# Patient Record
Sex: FEMALE | Race: BLACK OR AFRICAN AMERICAN | NOT HISPANIC OR LATINO | Employment: OTHER | RURAL
[De-identification: names, ages, dates, MRNs, and addresses within clinical notes are randomized per-mention and may not be internally consistent; named-entity substitution may affect disease eponyms.]

---

## 2017-09-18 ENCOUNTER — HISTORICAL (OUTPATIENT)
Dept: ADMINISTRATIVE | Facility: HOSPITAL | Age: 67
End: 2017-09-18

## 2017-09-21 LAB
LAB AP COMMENTS: NORMAL
LAB AP DIAGNOSIS - HISTORICAL: NORMAL
LAB AP GENERAL CAT - HISTORICAL: NORMAL
LAB AP GROSS PATHOLOGY - HISTORICAL: NORMAL
LAB AP INTERPRETATION/RESULT - HISTORICAL: NEGATIVE
LAB AP SPECIMEN ADEQUACY - HISTORICAL: NORMAL
LAB AP SPECIMEN SUBMITTED - HISTORICAL: NORMAL
LAB AP SPECIMEN SUBMITTED - HISTORICAL: NORMAL

## 2019-08-22 ENCOUNTER — HISTORICAL (OUTPATIENT)
Dept: ADMINISTRATIVE | Facility: HOSPITAL | Age: 69
End: 2019-08-22

## 2019-08-26 LAB
LAB AP CLINICAL INFORMATION: NORMAL
LAB AP DIAGNOSIS - HISTORICAL: NORMAL
LAB AP GROSS PATHOLOGY - HISTORICAL: NORMAL
LAB AP SPECIMEN SUBMITTED - HISTORICAL: NORMAL

## 2020-07-02 ENCOUNTER — HISTORICAL (OUTPATIENT)
Dept: ADMINISTRATIVE | Facility: HOSPITAL | Age: 70
End: 2020-07-02

## 2020-08-26 ENCOUNTER — HISTORICAL (OUTPATIENT)
Dept: ADMINISTRATIVE | Facility: HOSPITAL | Age: 70
End: 2020-08-26

## 2020-08-26 LAB — CALCIUM SERPL-MCNC: 9.6 MG/DL (ref 8.5–10.1)

## 2020-10-21 ENCOUNTER — HISTORICAL (OUTPATIENT)
Dept: ADMINISTRATIVE | Facility: HOSPITAL | Age: 70
End: 2020-10-21

## 2020-10-21 LAB
ALBUMIN SERPL BCP-MCNC: 3.4 G/DL (ref 3.5–5)
ALBUMIN/GLOB SERPL: 0.9 {RATIO}
ALP SERPL-CCNC: 72 U/L (ref 55–142)
ALT SERPL W P-5'-P-CCNC: 19 U/L (ref 13–56)
ANION GAP SERPL CALCULATED.3IONS-SCNC: 8 MMOL/L (ref 7–16)
AST SERPL W P-5'-P-CCNC: 13 U/L (ref 15–37)
BASOPHILS # BLD AUTO: 0.02 X10E3/UL (ref 0–0.2)
BASOPHILS NFR BLD AUTO: 0.5 % (ref 0–1)
BILIRUB SERPL-MCNC: 0.3 MG/DL (ref 0–1.2)
BUN SERPL-MCNC: 17 MG/DL (ref 7–18)
BUN/CREAT SERPL: 19
CALCIUM SERPL-MCNC: 8.6 MG/DL (ref 8.5–10.1)
CHLORIDE SERPL-SCNC: 109 MMOL/L (ref 98–107)
CHOLEST SERPL-MCNC: 205 MG/DL
CHOLEST/HDLC SERPL: 2.7 {RATIO}
CO2 SERPL-SCNC: 28 MMOL/L (ref 21–32)
CREAT SERPL-MCNC: 0.91 MG/DL (ref 0.5–1.02)
EOSINOPHIL # BLD AUTO: 0.12 X10E3/UL (ref 0–0.5)
EOSINOPHIL NFR BLD AUTO: 3.1 % (ref 1–4)
ERYTHROCYTE [DISTWIDTH] IN BLOOD BY AUTOMATED COUNT: 13.4 % (ref 11.5–14.5)
GLOBULIN SER-MCNC: 3.7 G/DL (ref 2–4)
GLUCOSE SERPL-MCNC: 98 MG/DL (ref 74–106)
HCT VFR BLD AUTO: 37.8 % (ref 38–47)
HDLC SERPL-MCNC: 76 MG/DL
HGB BLD-MCNC: 11.2 G/DL (ref 12–16)
IMM GRANULOCYTES # BLD AUTO: 0.01 X10E3/UL (ref 0–0.04)
IMM GRANULOCYTES NFR BLD: 0.3 % (ref 0–0.4)
LDLC SERPL CALC-MCNC: 107 MG/DL
LYMPHOCYTES # BLD AUTO: 1.79 X10E3/UL (ref 1–4.8)
LYMPHOCYTES NFR BLD AUTO: 45.8 % (ref 27–41)
MCH RBC QN AUTO: 26.9 PG (ref 27–31)
MCHC RBC AUTO-ENTMCNC: 29.6 G/DL (ref 32–36)
MCV RBC AUTO: 90.9 FL (ref 80–96)
MONOCYTES # BLD AUTO: 0.48 X10E3/UL (ref 0–0.8)
MONOCYTES NFR BLD AUTO: 12.3 % (ref 2–6)
MPC BLD CALC-MCNC: 11.2 FL (ref 9.4–12.4)
NEUTROPHILS # BLD AUTO: 1.49 X10E3/UL (ref 1.8–7.7)
NEUTROPHILS NFR BLD AUTO: 38 % (ref 53–65)
NRBC # BLD AUTO: 0 X10E3/UL (ref 0–0)
NRBC, AUTO (.00): 0 /100 (ref 0–0)
PLATELET # BLD AUTO: 341 X10E3/UL (ref 150–400)
POTASSIUM SERPL-SCNC: 3.9 MMOL/L (ref 3.5–5.1)
PROT SERPL-MCNC: 7.1 G/DL (ref 6.4–8.2)
RBC # BLD AUTO: 4.16 X10E6/UL (ref 4.2–5.4)
SODIUM SERPL-SCNC: 141 MMOL/L (ref 136–145)
TRIGL SERPL-MCNC: 110 MG/DL
WBC # BLD AUTO: 3.91 X10E3/UL (ref 4.5–11)

## 2021-04-13 ENCOUNTER — OFFICE VISIT (OUTPATIENT)
Dept: VASCULAR SURGERY | Facility: CLINIC | Age: 71
End: 2021-04-13
Payer: COMMERCIAL

## 2021-04-13 VITALS
HEIGHT: 64 IN | SYSTOLIC BLOOD PRESSURE: 126 MMHG | HEART RATE: 64 BPM | RESPIRATION RATE: 16 BRPM | BODY MASS INDEX: 32.03 KG/M2 | DIASTOLIC BLOOD PRESSURE: 68 MMHG | WEIGHT: 187.63 LBS

## 2021-04-13 DIAGNOSIS — M79.604 PAIN IN BOTH LOWER EXTREMITIES: ICD-10-CM

## 2021-04-13 DIAGNOSIS — G47.62 SLEEP RELATED LEG CRAMPS: ICD-10-CM

## 2021-04-13 DIAGNOSIS — I87.2 CHRONIC VENOUS INSUFFICIENCY OF LOWER EXTREMITY: Primary | ICD-10-CM

## 2021-04-13 DIAGNOSIS — M79.605 PAIN IN BOTH LOWER EXTREMITIES: ICD-10-CM

## 2021-04-13 PROCEDURE — 99999 PR PBB SHADOW E&M-EST. PATIENT-LVL IV: CPT | Mod: PBBFAC,,, | Performed by: NURSE PRACTITIONER

## 2021-04-13 PROCEDURE — 99213 PR OFFICE/OUTPT VISIT, EST, LEVL III, 20-29 MIN: ICD-10-PCS | Mod: S$PBB,,, | Performed by: NURSE PRACTITIONER

## 2021-04-13 PROCEDURE — 99213 OFFICE O/P EST LOW 20 MIN: CPT | Mod: S$PBB,,, | Performed by: NURSE PRACTITIONER

## 2021-04-13 PROCEDURE — 99999 PR PBB SHADOW E&M-EST. PATIENT-LVL IV: ICD-10-PCS | Mod: PBBFAC,,, | Performed by: NURSE PRACTITIONER

## 2021-04-13 PROCEDURE — 99214 OFFICE O/P EST MOD 30 MIN: CPT | Mod: PBBFAC | Performed by: NURSE PRACTITIONER

## 2021-04-13 RX ORDER — DICLOFENAC SODIUM 10 MG/G
2 GEL TOPICAL DAILY
COMMUNITY
End: 2021-06-08 | Stop reason: SDUPTHER

## 2021-04-13 RX ORDER — HYDROXYZINE PAMOATE 25 MG/1
25 CAPSULE ORAL 2 TIMES DAILY PRN
COMMUNITY
End: 2021-06-08 | Stop reason: SDUPTHER

## 2021-04-13 RX ORDER — POTASSIUM CHLORIDE 750 MG/1
10 TABLET, EXTENDED RELEASE ORAL ONCE
COMMUNITY
End: 2021-06-08 | Stop reason: SDUPTHER

## 2021-04-13 RX ORDER — DENOSUMAB 60 MG/ML
60 INJECTION SUBCUTANEOUS
COMMUNITY
End: 2024-03-11

## 2021-04-13 RX ORDER — LOVASTATIN 20 MG/1
20 TABLET ORAL NIGHTLY
COMMUNITY
End: 2021-06-08 | Stop reason: SDUPTHER

## 2021-04-13 RX ORDER — MONTELUKAST SODIUM 10 MG/1
10 TABLET ORAL NIGHTLY
COMMUNITY
End: 2021-09-21 | Stop reason: SDUPTHER

## 2021-04-13 RX ORDER — FLUTICASONE PROPIONATE 50 UG/1
1 POWDER, METERED RESPIRATORY (INHALATION) 2 TIMES DAILY
COMMUNITY
End: 2021-06-08 | Stop reason: SDUPTHER

## 2021-04-13 RX ORDER — ASPIRIN 81 MG/1
81 TABLET ORAL DAILY
COMMUNITY

## 2021-04-13 RX ORDER — TIZANIDINE HYDROCHLORIDE 4 MG/1
0.5 CAPSULE, GELATIN COATED ORAL NIGHTLY PRN
COMMUNITY
End: 2021-06-08 | Stop reason: CLARIF

## 2021-04-13 RX ORDER — LORATADINE 10 MG/1
10 TABLET ORAL DAILY
COMMUNITY
End: 2021-06-08 | Stop reason: SDUPTHER

## 2021-04-13 RX ORDER — ERGOCALCIFEROL 1.25 MG/1
50000 CAPSULE ORAL
COMMUNITY
End: 2021-06-08 | Stop reason: SDUPTHER

## 2021-04-13 RX ORDER — MELOXICAM 15 MG/1
15 TABLET ORAL DAILY
COMMUNITY
End: 2021-06-08 | Stop reason: SDUPTHER

## 2021-04-13 RX ORDER — VITAMIN E (DL,TOCOPHERYL ACET) 45 MG/0.25
1 DROPS ORAL
COMMUNITY

## 2021-04-13 RX ORDER — OLMESARTAN MEDOXOMIL AND HYDROCHLOROTHIAZIDE 20/12.5 20; 12.5 MG/1; MG/1
1 TABLET ORAL DAILY
COMMUNITY
End: 2021-06-08 | Stop reason: SDUPTHER

## 2021-04-13 RX ORDER — GARLIC 1000 MG
1 CAPSULE ORAL DAILY
COMMUNITY

## 2021-06-08 ENCOUNTER — OFFICE VISIT (OUTPATIENT)
Dept: FAMILY MEDICINE | Facility: CLINIC | Age: 71
End: 2021-06-08
Payer: COMMERCIAL

## 2021-06-08 VITALS
TEMPERATURE: 97 F | RESPIRATION RATE: 18 BRPM | DIASTOLIC BLOOD PRESSURE: 68 MMHG | WEIGHT: 184 LBS | SYSTOLIC BLOOD PRESSURE: 120 MMHG | HEART RATE: 74 BPM | OXYGEN SATURATION: 98 % | BODY MASS INDEX: 31.58 KG/M2

## 2021-06-08 VITALS
HEIGHT: 64 IN | RESPIRATION RATE: 16 BRPM | HEART RATE: 64 BPM | DIASTOLIC BLOOD PRESSURE: 68 MMHG | SYSTOLIC BLOOD PRESSURE: 126 MMHG | BODY MASS INDEX: 32.03 KG/M2 | WEIGHT: 187.63 LBS

## 2021-06-08 DIAGNOSIS — M79.605 PAIN IN BOTH LOWER EXTREMITIES: ICD-10-CM

## 2021-06-08 DIAGNOSIS — G47.62 SLEEP RELATED LEG CRAMPS: Primary | ICD-10-CM

## 2021-06-08 DIAGNOSIS — I10 HYPERTENSION, UNSPECIFIED TYPE: ICD-10-CM

## 2021-06-08 DIAGNOSIS — M79.604 PAIN IN BOTH LOWER EXTREMITIES: ICD-10-CM

## 2021-06-08 PROCEDURE — 99213 OFFICE O/P EST LOW 20 MIN: CPT | Mod: ,,, | Performed by: NURSE PRACTITIONER

## 2021-06-08 PROCEDURE — 99213 PR OFFICE/OUTPT VISIT, EST, LEVL III, 20-29 MIN: ICD-10-PCS | Mod: ,,, | Performed by: NURSE PRACTITIONER

## 2021-06-08 RX ORDER — ERGOCALCIFEROL 1.25 MG/1
50000 CAPSULE ORAL
Qty: 12 CAPSULE | Refills: 0 | Status: SHIPPED | OUTPATIENT
Start: 2021-06-08 | End: 2022-04-19 | Stop reason: SDUPTHER

## 2021-06-08 RX ORDER — LORATADINE 10 MG/1
10 TABLET ORAL DAILY
Qty: 90 TABLET | Refills: 1 | Status: SHIPPED | OUTPATIENT
Start: 2021-06-08 | End: 2021-09-21 | Stop reason: SDUPTHER

## 2021-06-08 RX ORDER — CYCLOBENZAPRINE HCL 10 MG
10 TABLET ORAL NIGHTLY
Qty: 30 TABLET | Refills: 2 | Status: SHIPPED | OUTPATIENT
Start: 2021-06-08 | End: 2021-06-18

## 2021-06-08 RX ORDER — OLMESARTAN MEDOXOMIL AND HYDROCHLOROTHIAZIDE 20/12.5 20; 12.5 MG/1; MG/1
1 TABLET ORAL DAILY
Qty: 90 TABLET | Refills: 1 | Status: SHIPPED | OUTPATIENT
Start: 2021-06-08 | End: 2021-09-21 | Stop reason: SDUPTHER

## 2021-06-08 RX ORDER — HYDROXYZINE PAMOATE 25 MG/1
25 CAPSULE ORAL 2 TIMES DAILY PRN
Qty: 180 CAPSULE | Refills: 1 | Status: SHIPPED | OUTPATIENT
Start: 2021-06-08 | End: 2021-09-21 | Stop reason: SDUPTHER

## 2021-06-08 RX ORDER — MELOXICAM 15 MG/1
15 TABLET ORAL DAILY
Qty: 90 TABLET | Refills: 1 | Status: SHIPPED | OUTPATIENT
Start: 2021-06-08 | End: 2021-09-21 | Stop reason: SDUPTHER

## 2021-06-08 RX ORDER — POTASSIUM CHLORIDE 750 MG/1
10 TABLET, EXTENDED RELEASE ORAL ONCE
Qty: 90 TABLET | Refills: 1 | Status: SHIPPED | OUTPATIENT
Start: 2021-06-08 | End: 2021-06-08

## 2021-06-08 RX ORDER — LOVASTATIN 20 MG/1
20 TABLET ORAL NIGHTLY
Qty: 90 TABLET | Refills: 1 | Status: SHIPPED | OUTPATIENT
Start: 2021-06-08 | End: 2021-09-21 | Stop reason: SDUPTHER

## 2021-06-08 RX ORDER — DICLOFENAC SODIUM 10 MG/G
2 GEL TOPICAL DAILY
Qty: 3 TUBE | Refills: 1 | Status: SHIPPED | OUTPATIENT
Start: 2021-06-08 | End: 2022-03-16 | Stop reason: SDUPTHER

## 2021-06-08 RX ORDER — FLUTICASONE PROPIONATE 50 UG/1
1 POWDER, METERED RESPIRATORY (INHALATION) 2 TIMES DAILY
Qty: 3 EACH | Refills: 1 | Status: SHIPPED | OUTPATIENT
Start: 2021-06-08

## 2021-06-08 RX ORDER — TIZANIDINE HYDROCHLORIDE 4 MG/1
0.5 CAPSULE, GELATIN COATED ORAL NIGHTLY PRN
Qty: 90 CAPSULE | Refills: 1 | Status: CANCELLED | OUTPATIENT
Start: 2021-06-08

## 2021-07-06 RX ORDER — TIZANIDINE 4 MG/1
4 TABLET ORAL NIGHTLY PRN
Qty: 30 TABLET | Refills: 0 | Status: SHIPPED | OUTPATIENT
Start: 2021-07-06 | End: 2021-09-23 | Stop reason: ALTCHOICE

## 2021-07-08 ENCOUNTER — OFFICE VISIT (OUTPATIENT)
Dept: URGENT CARE | Facility: CLINIC | Age: 71
End: 2021-07-08
Payer: COMMERCIAL

## 2021-07-08 VITALS
SYSTOLIC BLOOD PRESSURE: 99 MMHG | OXYGEN SATURATION: 99 % | DIASTOLIC BLOOD PRESSURE: 70 MMHG | RESPIRATION RATE: 18 BRPM | HEART RATE: 97 BPM

## 2021-07-08 DIAGNOSIS — R42 WEAKNESS WITH DIZZINESS: Primary | ICD-10-CM

## 2021-07-08 DIAGNOSIS — R53.1 WEAKNESS WITH DIZZINESS: Primary | ICD-10-CM

## 2021-07-08 DIAGNOSIS — R51.9 HEADACHE BEHIND THE EYES: ICD-10-CM

## 2021-07-08 DIAGNOSIS — Z98.890 STATUS POST EYE SURGERY: ICD-10-CM

## 2021-07-08 PROCEDURE — 99205 PR OFFICE/OUTPT VISIT, NEW, LEVL V, 60-74 MIN: ICD-10-PCS | Mod: S$GLB,,, | Performed by: NURSE PRACTITIONER

## 2021-07-08 PROCEDURE — 99205 OFFICE O/P NEW HI 60 MIN: CPT | Mod: S$GLB,,, | Performed by: NURSE PRACTITIONER

## 2021-08-19 DIAGNOSIS — Z12.11 ENCOUNTER FOR SCREENING FOR MALIGNANT NEOPLASM OF COLON: Primary | ICD-10-CM

## 2021-08-27 ENCOUNTER — INFUSION (OUTPATIENT)
Dept: INFUSION THERAPY | Facility: HOSPITAL | Age: 71
End: 2021-08-27
Attending: NURSE PRACTITIONER
Payer: COMMERCIAL

## 2021-08-27 VITALS
BODY MASS INDEX: 31.48 KG/M2 | WEIGHT: 184.38 LBS | RESPIRATION RATE: 18 BRPM | HEIGHT: 64 IN | DIASTOLIC BLOOD PRESSURE: 73 MMHG | TEMPERATURE: 98 F | HEART RATE: 79 BPM | SYSTOLIC BLOOD PRESSURE: 131 MMHG

## 2021-08-27 DIAGNOSIS — M81.0 OSTEOPOROSIS, UNSPECIFIED OSTEOPOROSIS TYPE, UNSPECIFIED PATHOLOGICAL FRACTURE PRESENCE: Primary | ICD-10-CM

## 2021-08-27 LAB — CALCIUM SERPL-MCNC: 8.9 MG/DL (ref 8.5–10.1)

## 2021-08-27 PROCEDURE — 63600175 PHARM REV CODE 636 W HCPCS: Performed by: NURSE PRACTITIONER

## 2021-08-27 PROCEDURE — 96372 THER/PROPH/DIAG INJ SC/IM: CPT

## 2021-08-27 PROCEDURE — 82310 ASSAY OF CALCIUM: CPT | Performed by: NURSE PRACTITIONER

## 2021-08-27 PROCEDURE — 36415 COLL VENOUS BLD VENIPUNCTURE: CPT | Performed by: NURSE PRACTITIONER

## 2021-08-27 RX ADMIN — DENOSUMAB 60 MG: 60 INJECTION SUBCUTANEOUS at 10:08

## 2021-09-08 ENCOUNTER — OFFICE VISIT (OUTPATIENT)
Dept: SURGERY | Facility: CLINIC | Age: 71
End: 2021-09-08
Attending: SURGERY
Payer: COMMERCIAL

## 2021-09-08 VITALS
SYSTOLIC BLOOD PRESSURE: 136 MMHG | WEIGHT: 180 LBS | RESPIRATION RATE: 18 BRPM | BODY MASS INDEX: 28.93 KG/M2 | HEART RATE: 84 BPM | HEIGHT: 66 IN | TEMPERATURE: 98 F | DIASTOLIC BLOOD PRESSURE: 75 MMHG

## 2021-09-08 DIAGNOSIS — Z12.11 SCREENING FOR MALIGNANT NEOPLASM OF COLON: Primary | ICD-10-CM

## 2021-09-08 PROCEDURE — 99204 PR OFFICE/OUTPT VISIT, NEW, LEVL IV, 45-59 MIN: ICD-10-PCS | Mod: S$PBB,,, | Performed by: SURGERY

## 2021-09-08 PROCEDURE — 99204 OFFICE O/P NEW MOD 45 MIN: CPT | Mod: S$PBB,,, | Performed by: SURGERY

## 2021-09-08 PROCEDURE — 99215 OFFICE O/P EST HI 40 MIN: CPT | Mod: PBBFAC | Performed by: SURGERY

## 2021-09-08 RX ORDER — SODIUM CHLORIDE, SODIUM LACTATE, POTASSIUM CHLORIDE, CALCIUM CHLORIDE 600; 310; 30; 20 MG/100ML; MG/100ML; MG/100ML; MG/100ML
INJECTION, SOLUTION INTRAVENOUS CONTINUOUS
Status: CANCELLED | OUTPATIENT
Start: 2021-09-08

## 2021-09-21 ENCOUNTER — OFFICE VISIT (OUTPATIENT)
Dept: FAMILY MEDICINE | Facility: CLINIC | Age: 71
End: 2021-09-21
Payer: COMMERCIAL

## 2021-09-21 VITALS
HEIGHT: 66 IN | SYSTOLIC BLOOD PRESSURE: 122 MMHG | WEIGHT: 181 LBS | TEMPERATURE: 97 F | DIASTOLIC BLOOD PRESSURE: 78 MMHG | OXYGEN SATURATION: 99 % | HEART RATE: 82 BPM | RESPIRATION RATE: 20 BRPM | BODY MASS INDEX: 29.09 KG/M2

## 2021-09-21 DIAGNOSIS — M79.605 PAIN IN BOTH LOWER EXTREMITIES: ICD-10-CM

## 2021-09-21 DIAGNOSIS — J30.9 ALLERGIC RHINITIS, UNSPECIFIED SEASONALITY, UNSPECIFIED TRIGGER: ICD-10-CM

## 2021-09-21 DIAGNOSIS — I10 HYPERTENSION, UNSPECIFIED TYPE: Primary | ICD-10-CM

## 2021-09-21 DIAGNOSIS — M79.604 PAIN IN BOTH LOWER EXTREMITIES: ICD-10-CM

## 2021-09-21 DIAGNOSIS — J01.00 ACUTE NON-RECURRENT MAXILLARY SINUSITIS: ICD-10-CM

## 2021-09-21 DIAGNOSIS — G47.62 SLEEP RELATED LEG CRAMPS: ICD-10-CM

## 2021-09-21 DIAGNOSIS — E78.5 HYPERLIPIDEMIA, UNSPECIFIED HYPERLIPIDEMIA TYPE: ICD-10-CM

## 2021-09-21 LAB
ALBUMIN SERPL BCP-MCNC: 3.4 G/DL (ref 3.5–5)
ALBUMIN/GLOB SERPL: 0.8 {RATIO}
ALP SERPL-CCNC: 68 U/L (ref 55–142)
ALT SERPL W P-5'-P-CCNC: 27 U/L (ref 13–56)
ANION GAP SERPL CALCULATED.3IONS-SCNC: 12 MMOL/L (ref 7–16)
AST SERPL W P-5'-P-CCNC: 20 U/L (ref 15–37)
BASOPHILS # BLD AUTO: 0.01 K/UL (ref 0–0.2)
BASOPHILS NFR BLD AUTO: 0.1 % (ref 0–1)
BILIRUB SERPL-MCNC: 0.3 MG/DL (ref 0–1.2)
BUN SERPL-MCNC: 19 MG/DL (ref 7–18)
BUN/CREAT SERPL: 19 (ref 6–20)
CALCIUM SERPL-MCNC: 9.4 MG/DL (ref 8.5–10.1)
CHLORIDE SERPL-SCNC: 103 MMOL/L (ref 98–107)
CHOLEST SERPL-MCNC: 255 MG/DL (ref 0–200)
CHOLEST/HDLC SERPL: 3.2 {RATIO}
CO2 SERPL-SCNC: 28 MMOL/L (ref 21–32)
CREAT SERPL-MCNC: 0.98 MG/DL (ref 0.55–1.02)
DIFFERENTIAL METHOD BLD: ABNORMAL
EOSINOPHIL # BLD AUTO: 0.03 K/UL (ref 0–0.5)
EOSINOPHIL NFR BLD AUTO: 0.4 % (ref 1–4)
ERYTHROCYTE [DISTWIDTH] IN BLOOD BY AUTOMATED COUNT: 13.4 % (ref 11.5–14.5)
GLOBULIN SER-MCNC: 4.1 G/DL (ref 2–4)
GLUCOSE SERPL-MCNC: 86 MG/DL (ref 74–106)
HCT VFR BLD AUTO: 39.2 % (ref 38–47)
HDLC SERPL-MCNC: 79 MG/DL (ref 40–60)
HGB BLD-MCNC: 11.7 G/DL (ref 12–16)
IMM GRANULOCYTES # BLD AUTO: 0.02 K/UL (ref 0–0.04)
IMM GRANULOCYTES NFR BLD: 0.3 % (ref 0–0.4)
LDLC SERPL CALC-MCNC: 144 MG/DL
LDLC/HDLC SERPL: 1.8 {RATIO}
LYMPHOCYTES # BLD AUTO: 2.43 K/UL (ref 1–4.8)
LYMPHOCYTES NFR BLD AUTO: 34.3 % (ref 27–41)
MCH RBC QN AUTO: 27.5 PG (ref 27–31)
MCHC RBC AUTO-ENTMCNC: 29.8 G/DL (ref 32–36)
MCV RBC AUTO: 92 FL (ref 80–96)
MONOCYTES # BLD AUTO: 0.69 K/UL (ref 0–0.8)
MONOCYTES NFR BLD AUTO: 9.7 % (ref 2–6)
MPC BLD CALC-MCNC: 10.6 FL (ref 9.4–12.4)
NEUTROPHILS # BLD AUTO: 3.9 K/UL (ref 1.8–7.7)
NEUTROPHILS NFR BLD AUTO: 55.2 % (ref 53–65)
NONHDLC SERPL-MCNC: 176 MG/DL
NRBC # BLD AUTO: 0 X10E3/UL
NRBC, AUTO (.00): 0 %
PLATELET # BLD AUTO: 358 K/UL (ref 150–400)
POTASSIUM SERPL-SCNC: 4.1 MMOL/L (ref 3.5–5.1)
PROT SERPL-MCNC: 7.5 G/DL (ref 6.4–8.2)
RBC # BLD AUTO: 4.26 M/UL (ref 4.2–5.4)
SODIUM SERPL-SCNC: 139 MMOL/L (ref 136–145)
TRIGL SERPL-MCNC: 161 MG/DL (ref 35–150)
VLDLC SERPL-MCNC: 32 MG/DL
WBC # BLD AUTO: 7.08 K/UL (ref 4.5–11)

## 2021-09-21 PROCEDURE — 80053 COMPREHENSIVE METABOLIC PANEL: ICD-10-PCS | Mod: QW,,, | Performed by: CLINICAL MEDICAL LABORATORY

## 2021-09-21 PROCEDURE — 85025 COMPLETE CBC W/AUTO DIFF WBC: CPT | Mod: QW,,, | Performed by: CLINICAL MEDICAL LABORATORY

## 2021-09-21 PROCEDURE — 96372 THER/PROPH/DIAG INJ SC/IM: CPT | Mod: ,,, | Performed by: NURSE PRACTITIONER

## 2021-09-21 PROCEDURE — 80061 LIPID PANEL: CPT | Mod: QW,,, | Performed by: CLINICAL MEDICAL LABORATORY

## 2021-09-21 PROCEDURE — 85025 CBC WITH DIFFERENTIAL: ICD-10-PCS | Mod: QW,,, | Performed by: CLINICAL MEDICAL LABORATORY

## 2021-09-21 PROCEDURE — 99214 PR OFFICE/OUTPT VISIT, EST, LEVL IV, 30-39 MIN: ICD-10-PCS | Mod: 25,,, | Performed by: NURSE PRACTITIONER

## 2021-09-21 PROCEDURE — 96372 PR INJECTION,THERAP/PROPH/DIAG2ST, IM OR SUBCUT: ICD-10-PCS | Mod: ,,, | Performed by: NURSE PRACTITIONER

## 2021-09-21 PROCEDURE — 99214 OFFICE O/P EST MOD 30 MIN: CPT | Mod: 25,,, | Performed by: NURSE PRACTITIONER

## 2021-09-21 PROCEDURE — 80061 LIPID PANEL: ICD-10-PCS | Mod: QW,,, | Performed by: CLINICAL MEDICAL LABORATORY

## 2021-09-21 PROCEDURE — 80053 COMPREHEN METABOLIC PANEL: CPT | Mod: QW,,, | Performed by: CLINICAL MEDICAL LABORATORY

## 2021-09-21 RX ORDER — CYCLOBENZAPRINE HCL 10 MG
10 TABLET ORAL NIGHTLY
Qty: 90 TABLET | Refills: 1 | Status: SHIPPED | OUTPATIENT
Start: 2021-09-21 | End: 2022-03-16 | Stop reason: SDUPTHER

## 2021-09-21 RX ORDER — CYCLOBENZAPRINE HCL 10 MG
10 TABLET ORAL 3 TIMES DAILY PRN
COMMUNITY
End: 2021-09-21 | Stop reason: SDUPTHER

## 2021-09-21 RX ORDER — AZITHROMYCIN 250 MG/1
TABLET, FILM COATED ORAL
Qty: 6 TABLET | Refills: 0 | Status: SHIPPED | OUTPATIENT
Start: 2021-09-21 | End: 2022-03-16

## 2021-09-21 RX ORDER — METHYLPREDNISOLONE ACETATE 40 MG/ML
40 INJECTION, SUSPENSION INTRA-ARTICULAR; INTRALESIONAL; INTRAMUSCULAR; SOFT TISSUE
Status: COMPLETED | OUTPATIENT
Start: 2021-09-21 | End: 2021-09-21

## 2021-09-21 RX ORDER — OLMESARTAN MEDOXOMIL AND HYDROCHLOROTHIAZIDE 20/12.5 20; 12.5 MG/1; MG/1
1 TABLET ORAL DAILY
Qty: 90 TABLET | Refills: 1 | Status: SHIPPED | OUTPATIENT
Start: 2021-09-21 | End: 2022-03-16 | Stop reason: SDUPTHER

## 2021-09-21 RX ORDER — MONTELUKAST SODIUM 10 MG/1
10 TABLET ORAL NIGHTLY
Qty: 90 TABLET | Refills: 1 | Status: SHIPPED | OUTPATIENT
Start: 2021-09-21 | End: 2022-03-16 | Stop reason: SDUPTHER

## 2021-09-21 RX ORDER — HYDROXYZINE PAMOATE 25 MG/1
25 CAPSULE ORAL 2 TIMES DAILY PRN
Qty: 180 CAPSULE | Refills: 1 | Status: SHIPPED | OUTPATIENT
Start: 2021-09-21 | End: 2023-03-07 | Stop reason: SDUPTHER

## 2021-09-21 RX ORDER — TRIAMCINOLONE ACETONIDE 1 MG/G
CREAM TOPICAL 2 TIMES DAILY
Qty: 28 G | Refills: 2 | Status: SHIPPED | OUTPATIENT
Start: 2021-09-21 | End: 2022-03-16 | Stop reason: SDUPTHER

## 2021-09-21 RX ORDER — LORATADINE 10 MG/1
10 TABLET ORAL DAILY
Qty: 90 TABLET | Refills: 1 | Status: SHIPPED | OUTPATIENT
Start: 2021-09-21 | End: 2022-03-16 | Stop reason: SDUPTHER

## 2021-09-21 RX ORDER — CEFTRIAXONE 1 G/1
1 INJECTION, POWDER, FOR SOLUTION INTRAMUSCULAR; INTRAVENOUS
Status: COMPLETED | OUTPATIENT
Start: 2021-09-21 | End: 2021-09-21

## 2021-09-21 RX ORDER — MELOXICAM 15 MG/1
15 TABLET ORAL DAILY
Qty: 90 TABLET | Refills: 1 | Status: SHIPPED | OUTPATIENT
Start: 2021-09-21 | End: 2022-03-16 | Stop reason: SDUPTHER

## 2021-09-21 RX ORDER — LOVASTATIN 20 MG/1
20 TABLET ORAL NIGHTLY
Qty: 90 TABLET | Refills: 1 | Status: SHIPPED | OUTPATIENT
Start: 2021-09-21 | End: 2021-09-23

## 2021-09-21 RX ADMIN — METHYLPREDNISOLONE ACETATE 40 MG: 40 INJECTION, SUSPENSION INTRA-ARTICULAR; INTRALESIONAL; INTRAMUSCULAR; SOFT TISSUE at 11:09

## 2021-09-21 RX ADMIN — CEFTRIAXONE 1 G: 1 INJECTION, POWDER, FOR SOLUTION INTRAMUSCULAR; INTRAVENOUS at 11:09

## 2021-09-23 DIAGNOSIS — E78.2 MIXED HYPERLIPIDEMIA: Primary | ICD-10-CM

## 2021-09-23 RX ORDER — LOVASTATIN 40 MG/1
40 TABLET ORAL NIGHTLY
Qty: 90 TABLET | Refills: 0 | Status: SHIPPED | OUTPATIENT
Start: 2021-09-23 | End: 2022-03-16 | Stop reason: SDUPTHER

## 2021-10-11 ENCOUNTER — LAB REQUISITION (OUTPATIENT)
Dept: LAB | Facility: HOSPITAL | Age: 71
End: 2021-10-11
Attending: SURGERY
Payer: COMMERCIAL

## 2021-10-11 LAB — SARS-COV+SARS-COV-2 AG RESP QL IA.RAPID: NEGATIVE

## 2021-10-11 PROCEDURE — 87426 SARSCOV CORONAVIRUS AG IA: CPT | Performed by: SURGERY

## 2021-10-13 ENCOUNTER — ANESTHESIA EVENT (OUTPATIENT)
Dept: GASTROENTEROLOGY | Facility: HOSPITAL | Age: 71
End: 2021-10-13
Payer: COMMERCIAL

## 2021-10-13 ENCOUNTER — HOSPITAL ENCOUNTER (OUTPATIENT)
Dept: GASTROENTEROLOGY | Facility: HOSPITAL | Age: 71
Discharge: HOME | End: 2021-10-13
Attending: SURGERY
Payer: COMMERCIAL

## 2021-10-13 ENCOUNTER — ANESTHESIA (OUTPATIENT)
Dept: GASTROENTEROLOGY | Facility: HOSPITAL | Age: 71
End: 2021-10-13
Payer: COMMERCIAL

## 2021-10-13 VITALS
RESPIRATION RATE: 16 BRPM | SYSTOLIC BLOOD PRESSURE: 108 MMHG | DIASTOLIC BLOOD PRESSURE: 54 MMHG | HEART RATE: 74 BPM | HEIGHT: 66 IN | TEMPERATURE: 98 F | BODY MASS INDEX: 28.93 KG/M2 | WEIGHT: 180 LBS | OXYGEN SATURATION: 98 %

## 2021-10-13 DIAGNOSIS — Z12.11 SCREENING FOR MALIGNANT NEOPLASM OF COLON: ICD-10-CM

## 2021-10-13 PROCEDURE — 00860 ANES XTRPRTL PX LWR ABD NOS: CPT

## 2021-10-13 PROCEDURE — 37000009 HC ANESTHESIA EA ADD 15 MINS

## 2021-10-13 PROCEDURE — G0121 COLON CA SCRN NOT HI RSK IND: HCPCS

## 2021-10-13 PROCEDURE — 25000003 PHARM REV CODE 250: Performed by: NURSE ANESTHETIST, CERTIFIED REGISTERED

## 2021-10-13 PROCEDURE — G0121 COLON CA SCRN NOT HI RSK IND: ICD-10-PCS | Mod: ,,, | Performed by: SURGERY

## 2021-10-13 PROCEDURE — 37000008 HC ANESTHESIA 1ST 15 MINUTES

## 2021-10-13 PROCEDURE — 63600175 PHARM REV CODE 636 W HCPCS: Performed by: SURGERY

## 2021-10-13 PROCEDURE — 63600175 PHARM REV CODE 636 W HCPCS: Performed by: NURSE ANESTHETIST, CERTIFIED REGISTERED

## 2021-10-13 PROCEDURE — 27000284 HC CANNULA NASAL: Performed by: NURSE ANESTHETIST, CERTIFIED REGISTERED

## 2021-10-13 PROCEDURE — G0121 COLON CA SCRN NOT HI RSK IND: HCPCS | Mod: ,,, | Performed by: SURGERY

## 2021-10-13 PROCEDURE — D9220A PRA ANESTHESIA: Mod: ,,, | Performed by: NURSE ANESTHETIST, CERTIFIED REGISTERED

## 2021-10-13 PROCEDURE — D9220A PRA ANESTHESIA: ICD-10-PCS | Mod: ,,, | Performed by: NURSE ANESTHETIST, CERTIFIED REGISTERED

## 2021-10-13 RX ORDER — LIDOCAINE HYDROCHLORIDE 20 MG/ML
INJECTION, SOLUTION EPIDURAL; INFILTRATION; INTRACAUDAL; PERINEURAL
Status: DISCONTINUED | OUTPATIENT
Start: 2021-10-13 | End: 2021-10-13

## 2021-10-13 RX ORDER — PROPOFOL 10 MG/ML
VIAL (ML) INTRAVENOUS
Status: DISCONTINUED | OUTPATIENT
Start: 2021-10-13 | End: 2021-10-13

## 2021-10-13 RX ORDER — SODIUM CHLORIDE, SODIUM LACTATE, POTASSIUM CHLORIDE, CALCIUM CHLORIDE 600; 310; 30; 20 MG/100ML; MG/100ML; MG/100ML; MG/100ML
INJECTION, SOLUTION INTRAVENOUS CONTINUOUS
Status: DISCONTINUED | OUTPATIENT
Start: 2021-10-13 | End: 2021-10-14 | Stop reason: HOSPADM

## 2021-10-13 RX ADMIN — SODIUM CHLORIDE, POTASSIUM CHLORIDE, SODIUM LACTATE AND CALCIUM CHLORIDE: 600; 310; 30; 20 INJECTION, SOLUTION INTRAVENOUS at 08:10

## 2021-10-13 RX ADMIN — PROPOFOL 40 MG: 10 INJECTION, EMULSION INTRAVENOUS at 08:10

## 2021-10-13 RX ADMIN — LIDOCAINE HYDROCHLORIDE 100 MG: 20 INJECTION, SOLUTION EPIDURAL; INFILTRATION; INTRACAUDAL; PERINEURAL at 08:10

## 2021-10-13 RX ADMIN — PROPOFOL 30 MG: 10 INJECTION, EMULSION INTRAVENOUS at 08:10

## 2021-10-13 RX ADMIN — PROPOFOL 100 MG: 10 INJECTION, EMULSION INTRAVENOUS at 08:10

## 2021-10-21 ENCOUNTER — HOSPITAL ENCOUNTER (OUTPATIENT)
Dept: RADIOLOGY | Facility: HOSPITAL | Age: 71
Discharge: HOME | End: 2021-10-21
Attending: PAIN MEDICINE
Payer: COMMERCIAL

## 2021-10-21 DIAGNOSIS — M54.16 LUMBAR RADICULOPATHY: ICD-10-CM

## 2021-10-21 PROCEDURE — 72148 MRI LUMBAR SPINE WITHOUT CONTRAST: ICD-10-PCS | Mod: 26,,, | Performed by: RADIOLOGY

## 2021-10-21 PROCEDURE — 72148 MRI LUMBAR SPINE W/O DYE: CPT | Mod: TC

## 2021-10-21 PROCEDURE — 72148 MRI LUMBAR SPINE W/O DYE: CPT | Mod: 26,,, | Performed by: RADIOLOGY

## 2021-11-24 ENCOUNTER — OFFICE VISIT (OUTPATIENT)
Dept: VASCULAR SURGERY | Facility: CLINIC | Age: 71
End: 2021-11-24
Payer: COMMERCIAL

## 2021-11-24 VITALS
BODY MASS INDEX: 29.83 KG/M2 | RESPIRATION RATE: 16 BRPM | SYSTOLIC BLOOD PRESSURE: 126 MMHG | HEART RATE: 76 BPM | HEIGHT: 66 IN | DIASTOLIC BLOOD PRESSURE: 78 MMHG | WEIGHT: 185.63 LBS

## 2021-11-24 DIAGNOSIS — I87.2 CHRONIC VENOUS INSUFFICIENCY OF LOWER EXTREMITY: Primary | ICD-10-CM

## 2021-11-24 DIAGNOSIS — I10 HYPERTENSION, UNSPECIFIED TYPE: ICD-10-CM

## 2021-11-24 PROCEDURE — 99214 OFFICE O/P EST MOD 30 MIN: CPT | Mod: S$PBB,,, | Performed by: NURSE PRACTITIONER

## 2021-11-24 PROCEDURE — 99214 PR OFFICE/OUTPT VISIT, EST, LEVL IV, 30-39 MIN: ICD-10-PCS | Mod: S$PBB,,, | Performed by: NURSE PRACTITIONER

## 2021-11-24 PROCEDURE — 99214 OFFICE O/P EST MOD 30 MIN: CPT | Mod: PBBFAC | Performed by: NURSE PRACTITIONER

## 2021-11-24 RX ORDER — DUREZOL 0.5 MG/ML
EMULSION OPHTHALMIC
COMMUNITY
Start: 2021-08-16 | End: 2023-11-30 | Stop reason: ALTCHOICE

## 2021-12-02 ENCOUNTER — OFFICE VISIT (OUTPATIENT)
Dept: OBSTETRICS AND GYNECOLOGY | Facility: CLINIC | Age: 71
End: 2021-12-02
Payer: COMMERCIAL

## 2021-12-02 VITALS
DIASTOLIC BLOOD PRESSURE: 80 MMHG | TEMPERATURE: 98 F | WEIGHT: 184 LBS | SYSTOLIC BLOOD PRESSURE: 128 MMHG | HEIGHT: 66 IN | BODY MASS INDEX: 29.57 KG/M2

## 2021-12-02 DIAGNOSIS — S39.011A STRAIN OF ABDOMINAL WALL, INITIAL ENCOUNTER: Primary | ICD-10-CM

## 2021-12-02 PROCEDURE — 99214 OFFICE O/P EST MOD 30 MIN: CPT | Mod: S$PBB,,, | Performed by: OBSTETRICS & GYNECOLOGY

## 2021-12-02 PROCEDURE — 99214 PR OFFICE/OUTPT VISIT, EST, LEVL IV, 30-39 MIN: ICD-10-PCS | Mod: S$PBB,,, | Performed by: OBSTETRICS & GYNECOLOGY

## 2021-12-02 PROCEDURE — 99214 OFFICE O/P EST MOD 30 MIN: CPT | Mod: PBBFAC | Performed by: OBSTETRICS & GYNECOLOGY

## 2022-02-02 ENCOUNTER — TELEPHONE (OUTPATIENT)
Dept: FAMILY MEDICINE | Facility: CLINIC | Age: 72
End: 2022-02-02
Payer: COMMERCIAL

## 2022-02-03 DIAGNOSIS — M81.0 AGE-RELATED OSTEOPOROSIS WITHOUT CURRENT PATHOLOGICAL FRACTURE: ICD-10-CM

## 2022-02-28 ENCOUNTER — INFUSION (OUTPATIENT)
Dept: INFUSION THERAPY | Facility: HOSPITAL | Age: 72
End: 2022-02-28
Attending: NURSE PRACTITIONER
Payer: COMMERCIAL

## 2022-02-28 VITALS
DIASTOLIC BLOOD PRESSURE: 71 MMHG | RESPIRATION RATE: 20 BRPM | TEMPERATURE: 98 F | HEART RATE: 91 BPM | SYSTOLIC BLOOD PRESSURE: 115 MMHG | WEIGHT: 184 LBS | BODY MASS INDEX: 29.7 KG/M2 | OXYGEN SATURATION: 97 %

## 2022-02-28 DIAGNOSIS — M81.0 AGE-RELATED OSTEOPOROSIS WITHOUT CURRENT PATHOLOGICAL FRACTURE: ICD-10-CM

## 2022-02-28 DIAGNOSIS — M81.0 OSTEOPOROSIS, UNSPECIFIED OSTEOPOROSIS TYPE, UNSPECIFIED PATHOLOGICAL FRACTURE PRESENCE: Primary | ICD-10-CM

## 2022-02-28 LAB — CALCIUM SERPL-MCNC: 9.1 MG/DL (ref 8.5–10.1)

## 2022-02-28 PROCEDURE — 82310 ASSAY OF CALCIUM: CPT | Performed by: NURSE PRACTITIONER

## 2022-02-28 PROCEDURE — 63600175 PHARM REV CODE 636 W HCPCS: Performed by: NURSE PRACTITIONER

## 2022-02-28 PROCEDURE — 96372 THER/PROPH/DIAG INJ SC/IM: CPT

## 2022-02-28 PROCEDURE — 36415 COLL VENOUS BLD VENIPUNCTURE: CPT | Performed by: NURSE PRACTITIONER

## 2022-02-28 RX ADMIN — DENOSUMAB 60 MG: 60 INJECTION SUBCUTANEOUS at 11:02

## 2022-02-28 NOTE — PROGRESS NOTES
1100  Patient in room.  Medications and history reviewed.  Calcium level today was 9.1      1113  Administered Prolia 60mg SQ to left upper arm.  Patient tolerated well.     1133  Patient had no adverse reactions. Patient discharged home ambulatory with appointment in hand to return in 6 months for next prolia injections.

## 2022-03-16 ENCOUNTER — OFFICE VISIT (OUTPATIENT)
Dept: FAMILY MEDICINE | Facility: CLINIC | Age: 72
End: 2022-03-16
Payer: COMMERCIAL

## 2022-03-16 VITALS
HEIGHT: 66 IN | OXYGEN SATURATION: 97 % | WEIGHT: 182.63 LBS | TEMPERATURE: 98 F | BODY MASS INDEX: 29.35 KG/M2 | DIASTOLIC BLOOD PRESSURE: 76 MMHG | RESPIRATION RATE: 20 BRPM | HEART RATE: 77 BPM | SYSTOLIC BLOOD PRESSURE: 128 MMHG

## 2022-03-16 DIAGNOSIS — G47.62 SLEEP RELATED LEG CRAMPS: ICD-10-CM

## 2022-03-16 DIAGNOSIS — E78.2 MIXED HYPERLIPIDEMIA: ICD-10-CM

## 2022-03-16 DIAGNOSIS — M79.605 PAIN IN BOTH LOWER EXTREMITIES: ICD-10-CM

## 2022-03-16 DIAGNOSIS — M25.552 LEFT HIP PAIN: ICD-10-CM

## 2022-03-16 DIAGNOSIS — M54.31 SCIATIC PAIN, RIGHT: Primary | ICD-10-CM

## 2022-03-16 DIAGNOSIS — I10 HYPERTENSION, UNSPECIFIED TYPE: ICD-10-CM

## 2022-03-16 DIAGNOSIS — M79.604 PAIN IN BOTH LOWER EXTREMITIES: ICD-10-CM

## 2022-03-16 DIAGNOSIS — J30.9 ALLERGIC RHINITIS, UNSPECIFIED SEASONALITY, UNSPECIFIED TRIGGER: ICD-10-CM

## 2022-03-16 PROBLEM — Z12.11 SCREENING FOR MALIGNANT NEOPLASM OF COLON: Status: RESOLVED | Noted: 2021-10-13 | Resolved: 2022-03-16

## 2022-03-16 LAB
BASOPHILS # BLD AUTO: 0.02 K/UL (ref 0–0.2)
BASOPHILS NFR BLD AUTO: 0.4 % (ref 0–1)
DIFFERENTIAL METHOD BLD: ABNORMAL
EOSINOPHIL # BLD AUTO: 0.21 K/UL (ref 0–0.5)
EOSINOPHIL NFR BLD AUTO: 4.1 % (ref 1–4)
ERYTHROCYTE [DISTWIDTH] IN BLOOD BY AUTOMATED COUNT: 12.8 % (ref 11.5–14.5)
HCT VFR BLD AUTO: 39.9 % (ref 38–47)
HGB BLD-MCNC: 12.1 G/DL (ref 12–16)
IMM GRANULOCYTES # BLD AUTO: 0.01 K/UL (ref 0–0.04)
IMM GRANULOCYTES NFR BLD: 0.2 % (ref 0–0.4)
LYMPHOCYTES # BLD AUTO: 2.42 K/UL (ref 1–4.8)
LYMPHOCYTES NFR BLD AUTO: 47.6 % (ref 27–41)
MCH RBC QN AUTO: 27.8 PG (ref 27–31)
MCHC RBC AUTO-ENTMCNC: 30.3 G/DL (ref 32–36)
MCV RBC AUTO: 91.7 FL (ref 80–96)
MONOCYTES # BLD AUTO: 0.56 K/UL (ref 0–0.8)
MONOCYTES NFR BLD AUTO: 11 % (ref 2–6)
MPC BLD CALC-MCNC: 11 FL (ref 9.4–12.4)
NEUTROPHILS # BLD AUTO: 1.86 K/UL (ref 1.8–7.7)
NEUTROPHILS NFR BLD AUTO: 36.7 % (ref 53–65)
NRBC # BLD AUTO: 0 X10E3/UL
NRBC, AUTO (.00): 0 %
PLATELET # BLD AUTO: 377 K/UL (ref 150–400)
RBC # BLD AUTO: 4.35 M/UL (ref 4.2–5.4)
WBC # BLD AUTO: 5.08 K/UL (ref 4.5–11)

## 2022-03-16 PROCEDURE — 3074F SYST BP LT 130 MM HG: CPT | Mod: ,,, | Performed by: NURSE PRACTITIONER

## 2022-03-16 PROCEDURE — 96372 THER/PROPH/DIAG INJ SC/IM: CPT | Mod: ,,, | Performed by: NURSE PRACTITIONER

## 2022-03-16 PROCEDURE — 3288F PR FALLS RISK ASSESSMENT DOCUMENTED: ICD-10-PCS | Mod: ,,, | Performed by: NURSE PRACTITIONER

## 2022-03-16 PROCEDURE — 3078F DIAST BP <80 MM HG: CPT | Mod: ,,, | Performed by: NURSE PRACTITIONER

## 2022-03-16 PROCEDURE — 3008F PR BODY MASS INDEX (BMI) DOCUMENTED: ICD-10-PCS | Mod: ,,, | Performed by: NURSE PRACTITIONER

## 2022-03-16 PROCEDURE — 85025 COMPLETE CBC W/AUTO DIFF WBC: CPT | Mod: ,,, | Performed by: CLINICAL MEDICAL LABORATORY

## 2022-03-16 PROCEDURE — 99213 OFFICE O/P EST LOW 20 MIN: CPT | Mod: 25,,, | Performed by: NURSE PRACTITIONER

## 2022-03-16 PROCEDURE — 1159F PR MEDICATION LIST DOCUMENTED IN MEDICAL RECORD: ICD-10-PCS | Mod: ,,, | Performed by: NURSE PRACTITIONER

## 2022-03-16 PROCEDURE — 3008F BODY MASS INDEX DOCD: CPT | Mod: ,,, | Performed by: NURSE PRACTITIONER

## 2022-03-16 PROCEDURE — 99213 PR OFFICE/OUTPT VISIT, EST, LEVL III, 20-29 MIN: ICD-10-PCS | Mod: 25,,, | Performed by: NURSE PRACTITIONER

## 2022-03-16 PROCEDURE — 85025 CBC WITH DIFFERENTIAL: ICD-10-PCS | Mod: ,,, | Performed by: CLINICAL MEDICAL LABORATORY

## 2022-03-16 PROCEDURE — 96372 PR INJECTION,THERAP/PROPH/DIAG2ST, IM OR SUBCUT: ICD-10-PCS | Mod: ,,, | Performed by: NURSE PRACTITIONER

## 2022-03-16 PROCEDURE — 1101F PT FALLS ASSESS-DOCD LE1/YR: CPT | Mod: ,,, | Performed by: NURSE PRACTITIONER

## 2022-03-16 PROCEDURE — 3288F FALL RISK ASSESSMENT DOCD: CPT | Mod: ,,, | Performed by: NURSE PRACTITIONER

## 2022-03-16 PROCEDURE — 3078F PR MOST RECENT DIASTOLIC BLOOD PRESSURE < 80 MM HG: ICD-10-PCS | Mod: ,,, | Performed by: NURSE PRACTITIONER

## 2022-03-16 PROCEDURE — 3074F PR MOST RECENT SYSTOLIC BLOOD PRESSURE < 130 MM HG: ICD-10-PCS | Mod: ,,, | Performed by: NURSE PRACTITIONER

## 2022-03-16 PROCEDURE — 1160F PR REVIEW ALL MEDS BY PRESCRIBER/CLIN PHARMACIST DOCUMENTED: ICD-10-PCS | Mod: ,,, | Performed by: NURSE PRACTITIONER

## 2022-03-16 PROCEDURE — 1159F MED LIST DOCD IN RCRD: CPT | Mod: ,,, | Performed by: NURSE PRACTITIONER

## 2022-03-16 PROCEDURE — 1101F PR PT FALLS ASSESS DOC 0-1 FALLS W/OUT INJ PAST YR: ICD-10-PCS | Mod: ,,, | Performed by: NURSE PRACTITIONER

## 2022-03-16 PROCEDURE — 1160F RVW MEDS BY RX/DR IN RCRD: CPT | Mod: ,,, | Performed by: NURSE PRACTITIONER

## 2022-03-16 RX ORDER — TRIAMCINOLONE ACETONIDE 1 MG/G
CREAM TOPICAL 2 TIMES DAILY
Qty: 28 G | Refills: 2 | Status: SHIPPED | OUTPATIENT
Start: 2022-03-16 | End: 2024-03-11 | Stop reason: SDUPTHER

## 2022-03-16 RX ORDER — DEXAMETHASONE SODIUM PHOSPHATE 4 MG/ML
4 INJECTION, SOLUTION INTRA-ARTICULAR; INTRALESIONAL; INTRAMUSCULAR; INTRAVENOUS; SOFT TISSUE
Status: COMPLETED | OUTPATIENT
Start: 2022-03-16 | End: 2022-03-16

## 2022-03-16 RX ORDER — MONTELUKAST SODIUM 10 MG/1
10 TABLET ORAL NIGHTLY
Qty: 90 TABLET | Refills: 1 | Status: SHIPPED | OUTPATIENT
Start: 2022-03-16 | End: 2023-06-22

## 2022-03-16 RX ORDER — TRAMADOL HYDROCHLORIDE 50 MG/1
50 TABLET ORAL EVERY 12 HOURS PRN
Qty: 12 TABLET | Refills: 0 | Status: SHIPPED | OUTPATIENT
Start: 2022-03-16

## 2022-03-16 RX ORDER — OLMESARTAN MEDOXOMIL AND HYDROCHLOROTHIAZIDE 20/12.5 20; 12.5 MG/1; MG/1
1 TABLET ORAL DAILY
Qty: 90 TABLET | Refills: 1 | Status: SHIPPED | OUTPATIENT
Start: 2022-03-16 | End: 2023-01-25 | Stop reason: SDUPTHER

## 2022-03-16 RX ORDER — LORATADINE 10 MG/1
10 TABLET ORAL DAILY
Qty: 90 TABLET | Refills: 1 | Status: SHIPPED | OUTPATIENT
Start: 2022-03-16 | End: 2023-01-25 | Stop reason: SDUPTHER

## 2022-03-16 RX ORDER — CYCLOBENZAPRINE HCL 10 MG
10 TABLET ORAL NIGHTLY
Qty: 90 TABLET | Refills: 1 | Status: SHIPPED | OUTPATIENT
Start: 2022-03-16 | End: 2022-10-10 | Stop reason: SDUPTHER

## 2022-03-16 RX ORDER — METHYLPREDNISOLONE ACETATE 40 MG/ML
40 INJECTION, SUSPENSION INTRA-ARTICULAR; INTRALESIONAL; INTRAMUSCULAR; SOFT TISSUE
Status: COMPLETED | OUTPATIENT
Start: 2022-03-16 | End: 2022-03-16

## 2022-03-16 RX ORDER — LOVASTATIN 40 MG/1
40 TABLET ORAL NIGHTLY
Qty: 90 TABLET | Refills: 1 | Status: SHIPPED | OUTPATIENT
Start: 2022-03-16 | End: 2022-09-20

## 2022-03-16 RX ORDER — DICLOFENAC SODIUM 10 MG/G
2 GEL TOPICAL DAILY
Qty: 3 EACH | Refills: 1 | Status: SHIPPED | OUTPATIENT
Start: 2022-03-16 | End: 2023-03-07 | Stop reason: SDUPTHER

## 2022-03-16 RX ORDER — MELOXICAM 15 MG/1
15 TABLET ORAL DAILY
Qty: 90 TABLET | Refills: 1 | Status: SHIPPED | OUTPATIENT
Start: 2022-03-16 | End: 2023-06-14 | Stop reason: SDUPTHER

## 2022-03-16 RX ORDER — POTASSIUM CHLORIDE 750 MG/1
10 TABLET, EXTENDED RELEASE ORAL DAILY
COMMUNITY
Start: 2022-02-23 | End: 2022-06-29

## 2022-03-16 RX ADMIN — METHYLPREDNISOLONE ACETATE 40 MG: 40 INJECTION, SUSPENSION INTRA-ARTICULAR; INTRALESIONAL; INTRAMUSCULAR; SOFT TISSUE at 10:03

## 2022-03-16 RX ADMIN — DEXAMETHASONE SODIUM PHOSPHATE 4 MG: 4 INJECTION, SOLUTION INTRA-ARTICULAR; INTRALESIONAL; INTRAMUSCULAR; INTRAVENOUS; SOFT TISSUE at 10:03

## 2022-03-16 NOTE — PROGRESS NOTES
SHARON Hughes   [facility]  [address]  [place] [address]     PATIENT NAME: Delmis Shell  : 1950  DATE: 3/16/22  MRN: [id number]      Billing Provider: SHARON Hughes  Level of Service: UT OFFICE/OUTPT VISIT, EST, LEVL III, 20-29 MIN  Patient PCP Information     Provider PCP Type    SHARON Hughes General          Reason for Visit / Chief Complaint: Low-back Pain (Right sided low back pain that radiates down right leg since last Friday.), Hypertension (Here for check up and med refills.), and Hyperlipidemia       Update PCP  Update Chief Complaint         History of Present Illness / Problem Focused Workflow     Delmis Shell presents to the clinic c/o pain right buttock that radiates down the back of her right leg. Symptoms started Friday, denies falling or picking up anything heavy.  Follow up on HTN and hyperlipidemia      Review of Systems     Review of Systems   Constitutional: Negative.  Negative for appetite change and unexpected weight change.   Eyes: Negative for visual disturbance.   Respiratory: Negative for cough, choking, chest tightness and shortness of breath.    Cardiovascular: Negative for chest pain, palpitations and leg swelling.   Gastrointestinal: Negative for abdominal pain, blood in stool, change in bowel habit, nausea, vomiting and change in bowel habit.   Musculoskeletal: Positive for back pain (Right lower back and buttock) and leg pain (right). Negative for gait problem.   Neurological: Negative for dizziness, weakness, numbness and headaches.        Medical / Social / Family History     Past Medical History:   Diagnosis Date    Hypertension     Left hip pain     Osteoporosis     Venous insufficiency, peripheral        Past Surgical History:   Procedure Laterality Date    EXCISION OF BREAST LESION Right     KNEE ARTHROSCOPY Right     [place] back disc [place]      in [place]    TUBAL LIGATION      VAGINAL  DELIVERY  1980    VAGINAL DELIVERY  1975    VENOUS ABLATION Right 09/18/2020    Anterior Shin Varithena Ablation performed by DR. Cesar Dumont.    VENOUS ABLATION Left 09/11/2020    Lower Anterior Shin Varithen Ablation performed by Dr. Cesar Dumont.       Social History  Ms.  reports that she has never smoked. She has never used smokeless tobacco. She reports previous alcohol use. She reports that she does not use drugs.    Family History  Ms.'s family history includes Breast cancer in her sister; Cancer in her maternal grandmother; Colon cancer in her brother and sister; Colon polyps in her sister; Diabetes in her father; Heart disease in her maternal grandfather; Kidney disease in her father; Stroke in her mother; Sudden death in her maternal grandfather.    Medications and Allergies     Medications  Outpatient Medications Marked as Taking for the 3/16/22 encounter (Office Visit) with SHARON Myers   Medication Sig Dispense Refill    aspirin (ECOTRIN) 81 MG EC tablet Take 81 mg by mouth once daily.      calcium carb and citrate-vitD3 600 mg calcium- 500 unit TbSR Take 1 tablet by mouth once daily.      denosumab (PROLIA) 60 mg/mL Syrg Inject 60 mg into the skin every 6 (six) months.      DUREZOL 0.05 % Drop ophthalmic solution       ergocalciferol (ERGOCALCIFEROL) 50,000 unit Cap Take 1 capsule (50,000 Units total) by mouth every 7 days. 12 capsule 0    garlic 1,000 mg Cap Take 1 capsule by mouth once daily.      potassium chloride (KLOR-CON) 10 MEQ TbSR Take 10 mEq by mouth once daily.      [DISCONTINUED] cyclobenzaprine (FLEXERIL) 10 MG tablet Take 1 tablet (10 mg total) by mouth every evening. 90 tablet 1    [DISCONTINUED] diclofenac sodium (VOLTAREN) 1 % Gel Apply 2 g topically once daily. 3 Tube 1    [DISCONTINUED] loratadine (CLARITIN) 10 mg tablet Take 1 tablet (10 mg total) by mouth once daily. 90 tablet 1    [DISCONTINUED] lovastatin (MEVACOR) 40 MG tablet Take 1 tablet (40 mg  "total) by mouth every evening. 90 tablet 0    [DISCONTINUED] meloxicam (MOBIC) 15 MG tablet Take 1 tablet (15 mg total) by mouth once daily. 90 tablet 1    [DISCONTINUED] montelukast (SINGULAIR) 10 mg tablet Take 1 tablet (10 mg total) by mouth every evening. 90 tablet 1    [DISCONTINUED] olmesartan-hydrochlorothiazide (BENICAR HCT) 20-12.5 mg per tablet Take 1 tablet by mouth once daily. 90 tablet 1    [DISCONTINUED] triamcinolone acetonide 0.1% (KENALOG) 0.1 % cream Apply topically 2 (two) times daily. 28 g 2     Current Facility-Administered Medications for the 3/16/22 encounter (Office Visit) with SHARON Myers   Medication Dose Route Frequency Provider Last Rate Last Admin    [COMPLETED] dexamethasone injection 4 mg  4 mg Intramuscular 1 time in Clinic/HOD SHARON Myers   4 mg at 03/16/22 1002    [COMPLETED] methylPREDNISolone acetate injection 40 mg  40 mg Intramuscular 1 time in Clinic/HOD SHARON Myers   40 mg at 03/16/22 1002       Allergies  Review of patient's allergies indicates:  No Known Allergies    Physical Examination     Vitals:    03/16/22 0926   BP: 128/76   BP Location: Right arm   Patient Position: Sitting   BP Method: Large (Manual)   Pulse: 77   Resp: 20   Temp: 97.6 °F (36.4 °C)   TempSrc: Oral   SpO2: 97%   Weight: 82.8 kg (182 lb 9.6 oz)   Height: 5' 6" (1.676 m)      Physical Exam  Constitutional:       General: She is not in acute distress.     Appearance: Normal appearance.   Eyes:      Extraocular Movements: Extraocular movements intact.      Pupils: Pupils are equal, round, and reactive to light.   Neck:      Thyroid: No thyromegaly.      Vascular: No carotid bruit.   Cardiovascular:      Rate and Rhythm: Normal rate and regular rhythm.      Pulses:           Carotid pulses are 3+ on the right side and 3+ on the left side.       Posterior tibial pulses are 3+ on the right side and 3+ on the left side.      Heart sounds: Normal heart sounds. No murmur " heard.  Pulmonary:      Effort: Pulmonary effort is normal. No accessory muscle usage or respiratory distress.      Breath sounds: Normal breath sounds. No wheezing, rhonchi or rales.   Abdominal:      General: Bowel sounds are normal.      Palpations: Abdomen is soft.   Musculoskeletal:      Cervical back: Neck supple.      Lumbar back: Tenderness present. No swelling, deformity or spasms. Positive right straight leg raise test.      Right lower leg: No edema.      Left lower leg: No edema.      Comments: Sciatica RLE; no weakness noted   Skin:     General: Skin is warm and dry.      Coloration: Skin is not jaundiced or pale.   Neurological:      Mental Status: She is alert and oriented to person, place, and time.      Sensory: Sensation is intact.      Gait: Gait is intact.          Assessment and Plan (including Health Maintenance)      Problem List  Smart Polimetrix  Document Outside HM   :        Health Maintenance Due   Topic Date Due    Hepatitis C Screening  Never done    Shingles Vaccine (2 of 3) 12/10/2013    COVID-19 Vaccine (3 - Booster for Pfizer series) 08/03/2021    Influenza Vaccine (1) 09/01/2021    Mammogram  10/31/2021       Problem List Items Addressed This Visit        Cardiac/Vascular    Hypertension    Relevant Medications    olmesartan-hydrochlorothiazide (BENICAR HCT) 20-12.5 mg per tablet    Other Relevant Orders    CBC Auto Differential    Comprehensive Metabolic Panel       Orthopedic    Pain in both lower extremities    Relevant Medications    meloxicam (MOBIC) 15 MG tablet    Left hip pain    Relevant Medications    diclofenac sodium (VOLTAREN) 1 % Gel       Other    Sleep related leg cramps    Relevant Medications    cyclobenzaprine (FLEXERIL) 10 MG tablet      Other Visit Diagnoses     Sciatic pain, right    -  Primary    Will treat with depomedrol and decadron injection along with ultram as needed and take mobic daily.  good    Relevant Medications    methylPREDNISolone acetate  injection 40 mg (Completed)    dexamethasone injection 4 mg (Completed)    traMADoL (ULTRAM) 50 mg tablet    Allergic rhinitis, unspecified seasonality, unspecified trigger        Relevant Medications    loratadine (CLARITIN) 10 mg tablet    montelukast (SINGULAIR) 10 mg tablet    Mixed hyperlipidemia        fasting lipids ordered; continue current medication and LFLC diet. LDL goal < 70  AHA risk score 20.1%    Relevant Medications    lovastatin (MEVACOR) 40 MG tablet    Other Relevant Orders    Lipid Panel        Sciatic pain, right  Comments:  Will treat with depomedrol and decadron injection along with ultram as needed and take mobic daily.  good  Orders:  -     methylPREDNISolone acetate injection 40 mg  -     dexamethasone injection 4 mg  -     traMADoL (ULTRAM) 50 mg tablet; Take 1 tablet (50 mg total) by mouth every 12 (twelve) hours as needed for Pain.  Dispense: 12 tablet; Refill: 0    Sleep related leg cramps  -     cyclobenzaprine (FLEXERIL) 10 MG tablet; Take 1 tablet (10 mg total) by mouth every evening.  Dispense: 90 tablet; Refill: 1    Allergic rhinitis, unspecified seasonality, unspecified trigger  -     loratadine (CLARITIN) 10 mg tablet; Take 1 tablet (10 mg total) by mouth once daily.  Dispense: 90 tablet; Refill: 1  -     montelukast (SINGULAIR) 10 mg tablet; Take 1 tablet (10 mg total) by mouth every evening.  Dispense: 90 tablet; Refill: 1    Mixed hyperlipidemia  Comments:  fasting lipids ordered; continue current medication and LFLC diet. LDL goal < 70  AHA risk score 20.1%  Orders:  -     lovastatin (MEVACOR) 40 MG tablet; Take 1 tablet (40 mg total) by mouth every evening.  Dispense: 90 tablet; Refill: 1  -     Lipid Panel; Future; Expected date: 03/16/2022    Pain in both lower extremities  -     meloxicam (MOBIC) 15 MG tablet; Take 1 tablet (15 mg total) by mouth once daily.  Dispense: 90 tablet; Refill: 1    Hypertension, unspecified type  Comments:  Will check CBC and CMP and  continue current medication and low sodium diet.  Orders:  -     olmesartan-hydrochlorothiazide (BENICAR HCT) 20-12.5 mg per tablet; Take 1 tablet by mouth once daily.  Dispense: 90 tablet; Refill: 1  -     CBC Auto Differential; Future; Expected date: 03/16/2022  -     Comprehensive Metabolic Panel; Future; Expected date: 03/16/2022    Left hip pain  -     diclofenac sodium (VOLTAREN) 1 % Gel; Apply 2 g topically once daily.  Dispense: 3 each; Refill: 1    Other orders  -     triamcinolone acetonide 0.1% (KENALOG) 0.1 % cream; Apply topically 2 (two) times daily.  Dispense: 28 g; Refill: 2       Health Maintenance Topics with due status: Not Due       Topic Last Completion Date    DEXA Scan 12/02/2019    Lipid Panel 09/21/2021    Colorectal Cancer Screening 10/13/2021       Procedures     Future Appointments   Date Time Provider Department Center   4/26/2022  3:00 PM [facility] NURSE [facility]UTZSOLIS [facility]   8/30/2022 10:00 AM PASQU[facility] BLAIR Replaced by [facility]        Follow up in about 1 month (around 4/16/2022), or if symptoms worsen or fail to improve.     Signature:  SHARON Hughes    Date of encounter: 3/16/22

## 2022-04-19 DIAGNOSIS — E55.9 VITAMIN D DEFICIENCY: Primary | ICD-10-CM

## 2022-04-19 RX ORDER — ERGOCALCIFEROL 1.25 MG/1
50000 CAPSULE ORAL
Qty: 12 CAPSULE | Refills: 1 | Status: SHIPPED | OUTPATIENT
Start: 2022-04-19 | End: 2022-12-07 | Stop reason: SDUPTHER

## 2022-06-29 RX ORDER — POTASSIUM CHLORIDE 750 MG/1
TABLET, EXTENDED RELEASE ORAL
Qty: 90 TABLET | Refills: 1 | Status: SHIPPED | OUTPATIENT
Start: 2022-06-29 | End: 2022-09-20

## 2022-07-26 ENCOUNTER — TELEPHONE (OUTPATIENT)
Dept: FAMILY MEDICINE | Facility: CLINIC | Age: 72
End: 2022-07-26
Payer: COMMERCIAL

## 2022-07-26 NOTE — TELEPHONE ENCOUNTER
----- Message from Mary Martinez RN sent at 7/25/2022  1:28 PM CDT -----  This patient will need a new therapy plan for Prolia injection.  Please do not update plan.  Please create new plan.   ThanksKaylie

## 2022-08-30 ENCOUNTER — INFUSION (OUTPATIENT)
Dept: INFUSION THERAPY | Facility: HOSPITAL | Age: 72
End: 2022-08-30
Attending: NURSE PRACTITIONER
Payer: COMMERCIAL

## 2022-08-30 VITALS
SYSTOLIC BLOOD PRESSURE: 117 MMHG | HEIGHT: 65 IN | RESPIRATION RATE: 18 BRPM | DIASTOLIC BLOOD PRESSURE: 76 MMHG | TEMPERATURE: 98 F | WEIGHT: 180.81 LBS | BODY MASS INDEX: 30.12 KG/M2 | OXYGEN SATURATION: 96 % | HEART RATE: 79 BPM

## 2022-08-30 DIAGNOSIS — M81.0 OSTEOPOROSIS, UNSPECIFIED OSTEOPOROSIS TYPE, UNSPECIFIED PATHOLOGICAL FRACTURE PRESENCE: Primary | ICD-10-CM

## 2022-08-30 DIAGNOSIS — M81.0 AGE-RELATED OSTEOPOROSIS WITHOUT CURRENT PATHOLOGICAL FRACTURE: ICD-10-CM

## 2022-08-30 LAB — CALCIUM SERPL-MCNC: 9.5 MG/DL (ref 8.5–10.1)

## 2022-08-30 PROCEDURE — 96372 THER/PROPH/DIAG INJ SC/IM: CPT

## 2022-08-30 PROCEDURE — 82310 ASSAY OF CALCIUM: CPT | Performed by: NURSE PRACTITIONER

## 2022-08-30 PROCEDURE — 63600175 PHARM REV CODE 636 W HCPCS: Performed by: NURSE PRACTITIONER

## 2022-08-30 PROCEDURE — 36415 COLL VENOUS BLD VENIPUNCTURE: CPT | Performed by: NURSE PRACTITIONER

## 2022-08-30 RX ADMIN — DENOSUMAB 60 MG: 60 INJECTION SUBCUTANEOUS at 11:08

## 2022-08-30 NOTE — PROGRESS NOTES
1045  Pt in room 1.  Vital signs obtained.  Calcium level ordered.     1104 Calcium level  9.5.  Pt stated that she is taking her Calcium with Vitamin D.     1113 Administered Prolia 60mg SQ to right upper arm per protocol.  Pt tolerated well.    1133 No adverse reaction noted.  D/C home, ambulatory with appointment in hand to return in 6 months for next Prolia injection.  Next appt   02/02/2023 at 10:00.  Understanding voiced.

## 2022-09-20 DIAGNOSIS — E78.2 MIXED HYPERLIPIDEMIA: ICD-10-CM

## 2022-09-20 RX ORDER — POTASSIUM CHLORIDE 750 MG/1
TABLET, EXTENDED RELEASE ORAL
Qty: 90 TABLET | Refills: 0 | Status: SHIPPED | OUTPATIENT
Start: 2022-09-20 | End: 2022-12-13

## 2022-09-20 RX ORDER — LOVASTATIN 40 MG/1
TABLET ORAL
Qty: 90 TABLET | Refills: 0 | Status: SHIPPED | OUTPATIENT
Start: 2022-09-20 | End: 2023-11-30 | Stop reason: CLARIF

## 2022-10-10 ENCOUNTER — OFFICE VISIT (OUTPATIENT)
Dept: FAMILY MEDICINE | Facility: CLINIC | Age: 72
End: 2022-10-10
Payer: COMMERCIAL

## 2022-10-10 VITALS
SYSTOLIC BLOOD PRESSURE: 136 MMHG | HEIGHT: 65 IN | OXYGEN SATURATION: 97 % | TEMPERATURE: 98 F | RESPIRATION RATE: 16 BRPM | HEART RATE: 76 BPM | WEIGHT: 184.38 LBS | BODY MASS INDEX: 30.72 KG/M2 | DIASTOLIC BLOOD PRESSURE: 80 MMHG

## 2022-10-10 DIAGNOSIS — G47.62 SLEEP RELATED LEG CRAMPS: ICD-10-CM

## 2022-10-10 DIAGNOSIS — J01.40 ACUTE NON-RECURRENT PANSINUSITIS: Primary | ICD-10-CM

## 2022-10-10 DIAGNOSIS — H61.23 IMPACTED CERUMEN OF BOTH EARS: ICD-10-CM

## 2022-10-10 PROCEDURE — 96372 PR INJECTION,THERAP/PROPH/DIAG2ST, IM OR SUBCUT: ICD-10-PCS | Mod: ,,, | Performed by: NURSE PRACTITIONER

## 2022-10-10 PROCEDURE — 99213 OFFICE O/P EST LOW 20 MIN: CPT | Mod: 25,,, | Performed by: NURSE PRACTITIONER

## 2022-10-10 PROCEDURE — 1101F PT FALLS ASSESS-DOCD LE1/YR: CPT | Mod: ,,, | Performed by: NURSE PRACTITIONER

## 2022-10-10 PROCEDURE — 1159F PR MEDICATION LIST DOCUMENTED IN MEDICAL RECORD: ICD-10-PCS | Mod: ,,, | Performed by: NURSE PRACTITIONER

## 2022-10-10 PROCEDURE — 1159F MED LIST DOCD IN RCRD: CPT | Mod: ,,, | Performed by: NURSE PRACTITIONER

## 2022-10-10 PROCEDURE — 1160F RVW MEDS BY RX/DR IN RCRD: CPT | Mod: ,,, | Performed by: NURSE PRACTITIONER

## 2022-10-10 PROCEDURE — 99213 PR OFFICE/OUTPT VISIT, EST, LEVL III, 20-29 MIN: ICD-10-PCS | Mod: 25,,, | Performed by: NURSE PRACTITIONER

## 2022-10-10 PROCEDURE — 69210 REMOVE IMPACTED EAR WAX UNI: CPT | Mod: ,,, | Performed by: NURSE PRACTITIONER

## 2022-10-10 PROCEDURE — 3079F PR MOST RECENT DIASTOLIC BLOOD PRESSURE 80-89 MM HG: ICD-10-PCS | Mod: ,,, | Performed by: NURSE PRACTITIONER

## 2022-10-10 PROCEDURE — 96372 THER/PROPH/DIAG INJ SC/IM: CPT | Mod: ,,, | Performed by: NURSE PRACTITIONER

## 2022-10-10 PROCEDURE — 3075F PR MOST RECENT SYSTOLIC BLOOD PRESS GE 130-139MM HG: ICD-10-PCS | Mod: ,,, | Performed by: NURSE PRACTITIONER

## 2022-10-10 PROCEDURE — 3075F SYST BP GE 130 - 139MM HG: CPT | Mod: ,,, | Performed by: NURSE PRACTITIONER

## 2022-10-10 PROCEDURE — 1125F AMNT PAIN NOTED PAIN PRSNT: CPT | Mod: ,,, | Performed by: NURSE PRACTITIONER

## 2022-10-10 PROCEDURE — 69210 EAR CERUMEN REMOVAL: ICD-10-PCS | Mod: ,,, | Performed by: NURSE PRACTITIONER

## 2022-10-10 PROCEDURE — 1101F PR PT FALLS ASSESS DOC 0-1 FALLS W/OUT INJ PAST YR: ICD-10-PCS | Mod: ,,, | Performed by: NURSE PRACTITIONER

## 2022-10-10 PROCEDURE — 3288F FALL RISK ASSESSMENT DOCD: CPT | Mod: ,,, | Performed by: NURSE PRACTITIONER

## 2022-10-10 PROCEDURE — 1160F PR REVIEW ALL MEDS BY PRESCRIBER/CLIN PHARMACIST DOCUMENTED: ICD-10-PCS | Mod: ,,, | Performed by: NURSE PRACTITIONER

## 2022-10-10 PROCEDURE — 1125F PR PAIN SEVERITY QUANTIFIED, PAIN PRESENT: ICD-10-PCS | Mod: ,,, | Performed by: NURSE PRACTITIONER

## 2022-10-10 PROCEDURE — 3288F PR FALLS RISK ASSESSMENT DOCUMENTED: ICD-10-PCS | Mod: ,,, | Performed by: NURSE PRACTITIONER

## 2022-10-10 PROCEDURE — 3079F DIAST BP 80-89 MM HG: CPT | Mod: ,,, | Performed by: NURSE PRACTITIONER

## 2022-10-10 RX ORDER — AZITHROMYCIN 250 MG/1
TABLET, FILM COATED ORAL
Qty: 6 TABLET | Refills: 0 | Status: SHIPPED | OUTPATIENT
Start: 2022-10-10 | End: 2022-10-15

## 2022-10-10 RX ORDER — GABAPENTIN 100 MG/1
100 CAPSULE ORAL 3 TIMES DAILY
COMMUNITY
Start: 2022-10-10 | End: 2023-03-21

## 2022-10-10 RX ORDER — CYCLOBENZAPRINE HCL 10 MG
10 TABLET ORAL NIGHTLY
Qty: 90 TABLET | Refills: 1 | Status: SHIPPED | OUTPATIENT
Start: 2022-10-10 | End: 2023-11-30 | Stop reason: CLARIF

## 2022-10-10 RX ORDER — DEXAMETHASONE SODIUM PHOSPHATE 4 MG/ML
4 INJECTION, SOLUTION INTRA-ARTICULAR; INTRALESIONAL; INTRAMUSCULAR; INTRAVENOUS; SOFT TISSUE
Status: COMPLETED | OUTPATIENT
Start: 2022-10-10 | End: 2022-10-10

## 2022-10-10 RX ORDER — CEFTRIAXONE 1 G/1
1 INJECTION, POWDER, FOR SOLUTION INTRAMUSCULAR; INTRAVENOUS
Status: COMPLETED | OUTPATIENT
Start: 2022-10-10 | End: 2022-10-10

## 2022-10-10 RX ADMIN — DEXAMETHASONE SODIUM PHOSPHATE 4 MG: 4 INJECTION, SOLUTION INTRA-ARTICULAR; INTRALESIONAL; INTRAMUSCULAR; INTRAVENOUS; SOFT TISSUE at 04:10

## 2022-10-10 RX ADMIN — CEFTRIAXONE 1 G: 1 INJECTION, POWDER, FOR SOLUTION INTRAMUSCULAR; INTRAVENOUS at 04:10

## 2022-10-10 NOTE — PROGRESS NOTES
Nikki Bridges NP   [place] [id number]  [place]  [address]      PATIENT NAME: Delmis Shell  : 1950  DATE: 10/10/22  MRN: [id number]      Billing Provider: Nikki Bridges NP  Level of Service: SC OFFICE/OUTPT VISIT, EST, LEVL III, 20-29 MIN  Patient PCP Information       Provider PCP Type    SHARON [facility]            Reason for Visit / Chief Complaint: Sinus Problem (Reports her head feels tight, eyes burning, gets dizzy sometimes when she stands up. X3 days), Cough, and Ear Fullness       Update PCP  Update Chief Complaint         History of Present Illness / Problem Focused Workflow     Delmis Shell presents to the clinic with Sinus Problem (Reports her head feels tight, eyes burning, gets dizzy sometimes when she stands up. X3 days), Cough, and Ear Fullness     72 year old female presents to clinic with complaints of sinus congestion, eyes burning, cough, and ear fullness x 3 days. Denies fever. Denies known sick contacts. Patient is also requesting refill on Flexeril which she states she takes for muscle spasms in her back and legs.     Review of Systems     @Review of Systems   Constitutional:  Negative for activity change, appetite change, fatigue and fever.   HENT:  Positive for nasal congestion, ear pain, rhinorrhea and sinus pressure/congestion. Negative for sore throat.    Eyes:  Positive for pain. Negative for redness, visual disturbance and eye dryness.   Respiratory:  Positive for cough. Negative for shortness of breath.    Cardiovascular:  Negative for chest pain and leg swelling.   Gastrointestinal:  Negative for abdominal distention, abdominal pain, constipation and diarrhea.   Endocrine: Negative for cold intolerance, heat intolerance and polyuria.   Genitourinary:  Negative for bladder incontinence, dysuria, frequency and urgency.   Musculoskeletal:  Negative for arthralgias, gait problem and myalgias.   Integumentary:  Negative  for color change, rash and wound.   Allergic/Immunologic: Negative for environmental allergies and food allergies.   Neurological:  Negative for dizziness, weakness, light-headedness and headaches.   Psychiatric/Behavioral:  Negative for behavioral problems and sleep disturbance.      Medical / Social / Family History     Past Medical History:   Diagnosis Date    Arthritis     Hypertension     Left hip pain     Osteoporosis     Venous insufficiency, peripheral        Past Surgical History:   Procedure Laterality Date    EXCISION OF BREAST LESION Right 1970    KNEE ARTHROSCOPY Right 2020    low back disc surgery  2014    in [place]    TUBAL LIGATION      VAGINAL DELIVERY  1980    VAGINAL DELIVERY  1975    VENOUS ABLATION Right 09/18/2020    Anterior Shin Varithena Ablation performed by DR. Cesar Dumont.    VENOUS ABLATION Left 09/11/2020    Lower Anterior Shin Varithen Ablation performed by Dr. Cesar Dumont.       Social History  Ms.  reports that she has never smoked. She has never been exposed to tobacco smoke. She has never used smokeless tobacco. She reports that she does not currently use alcohol. She reports that she does not use drugs.    Family History  Ms.'s family history includes Breast cancer in her sister; Cancer in her maternal grandmother; Colon cancer in her brother and sister; Colon polyps in her sister; Diabetes in her father; Heart disease in her maternal grandfather; Kidney disease in her father; Stroke in her mother; Sudden death in her maternal grandfather.    Medications and Allergies     Medications  Outpatient Medications Marked as Taking for the 10/10/22 encounter (Office Visit) with Nikki Bridges NP   Medication Sig Dispense Refill    aspirin (ECOTRIN) 81 MG EC tablet Take 81 mg by mouth once daily.      denosumab (PROLIA) 60 mg/mL Syrg Inject 60 mg into the skin every 6 (six) months.      diclofenac sodium (VOLTAREN) 1 % Gel Apply 2 g topically once daily. 3 each 1    DUREZOL 0.05 %  Drop ophthalmic solution       ergocalciferol (ERGOCALCIFEROL) 50,000 unit Cap Take 1 capsule (50,000 Units total) by mouth every 7 days. 12 capsule 1    fluticasone propionate (FLOVENT DISKUS) 50 mcg/actuation DsDv Inhale 1 spray into the lungs 2 (two) times a day. Controller 3 each 1    gabapentin (NEURONTIN) 100 MG capsule Take 100 mg by mouth 3 (three) times daily.      garlic 1,000 mg Cap Take 1 capsule by mouth once daily.      hydrOXYzine pamoate (VISTARIL) 25 MG Cap Take 1 capsule (25 mg total) by mouth 2 (two) times daily as needed (itching). 180 capsule 1    loratadine (CLARITIN) 10 mg tablet Take 1 tablet (10 mg total) by mouth once daily. 90 tablet 1    lovastatin (MEVACOR) 40 MG tablet TAKE 1 TABLET BY MOUTH EVERY EVENING 90 tablet 0    meloxicam (MOBIC) 15 MG tablet Take 1 tablet (15 mg total) by mouth once daily. (Patient taking differently: Take 15 mg by mouth daily as needed.) 90 tablet 1    montelukast (SINGULAIR) 10 mg tablet Take 1 tablet (10 mg total) by mouth every evening. 90 tablet 1    olmesartan-hydrochlorothiazide (BENICAR HCT) 20-12.5 mg per tablet Take 1 tablet by mouth once daily. 90 tablet 1    potassium chloride (KLOR-CON) 10 MEQ TbSR TAKE 1 TABLET BY MOUTH ONCE DAILY 90 tablet 0    traMADoL (ULTRAM) 50 mg tablet Take 1 tablet (50 mg total) by mouth every 12 (twelve) hours as needed for Pain. 12 tablet 0    [DISCONTINUED] cyclobenzaprine (FLEXERIL) 10 MG tablet Take 1 tablet (10 mg total) by mouth every evening. 90 tablet 1       Allergies  Review of patient's allergies indicates:  No Known Allergies    Physical Examination     Vitals:    10/10/22 1546   BP: 136/80   Pulse: 76   Resp: 16   Temp: 97.6 °F (36.4 °C)     Physical Exam  Vitals and nursing note reviewed.   HENT:      Head: Normocephalic.      Right Ear: Tympanic membrane normal. There is impacted cerumen.      Left Ear: Tympanic membrane normal. There is impacted cerumen.      Ears:      Comments: Impacted cerumen to bilat  ears. After removal TM noted intact.     Nose: Congestion and rhinorrhea present.      Mouth/Throat:      Mouth: Mucous membranes are moist.      Pharynx: Oropharynx is clear. No posterior oropharyngeal erythema.   Eyes:      Conjunctiva/sclera: Conjunctivae normal.   Cardiovascular:      Rate and Rhythm: Normal rate and regular rhythm.      Pulses: Normal pulses.      Heart sounds: Normal heart sounds.   Pulmonary:      Effort: Pulmonary effort is normal.      Breath sounds: Normal breath sounds.   Abdominal:      General: Abdomen is flat. Bowel sounds are normal. There is no distension.      Palpations: Abdomen is soft.   Musculoskeletal:         General: No swelling or tenderness. Normal range of motion.      Cervical back: Normal range of motion.      Right lower leg: No edema.      Left lower leg: No edema.   Skin:     General: Skin is warm and dry.      Capillary Refill: Capillary refill takes less than 2 seconds.   Neurological:      Mental Status: She is alert. Mental status is at baseline.   Psychiatric:         Mood and Affect: Mood normal.         Behavior: Behavior normal.             Lab Results   Component Value Date    WBC 5.08 03/16/2022    HGB 12.1 03/16/2022    HCT 39.9 03/16/2022    MCV 91.7 03/16/2022     03/16/2022          Sodium   Date Value Ref Range Status   09/21/2021 139 136 - 145 mmol/L Final     Potassium   Date Value Ref Range Status   09/21/2021 4.1 3.5 - 5.1 mmol/L Final     Chloride   Date Value Ref Range Status   09/21/2021 103 98 - 107 mmol/L Final     CO2   Date Value Ref Range Status   09/21/2021 28 21 - 32 mmol/L Final     Glucose   Date Value Ref Range Status   09/21/2021 86 74 - 106 mg/dL Final     BUN   Date Value Ref Range Status   09/21/2021 19 (H) 7 - 18 mg/dL Final     Creatinine   Date Value Ref Range Status   09/21/2021 0.98 0.55 - 1.02 mg/dL Final     Calcium   Date Value Ref Range Status   08/30/2022 9.5 8.5 - 10.1 mg/dL Final     Total Protein   Date Value Ref  Range Status   09/21/2021 7.5 6.4 - 8.2 g/dL Final     Albumin   Date Value Ref Range Status   09/21/2021 3.4 (L) 3.5 - 5.0 g/dL Final     Bilirubin, Total   Date Value Ref Range Status   09/21/2021 0.3 >0.0 - 1.2 mg/dL Final     Alk Phos   Date Value Ref Range Status   09/21/2021 68 55 - 142 U/L Final     AST   Date Value Ref Range Status   09/21/2021 20 15 - 37 U/L Final     ALT   Date Value Ref Range Status   09/21/2021 27 13 - 56 U/L Final     Anion Gap   Date Value Ref Range Status   09/21/2021 12 7 - 16 mmol/L Final     eGFR    Date Value Ref Range Status   09/21/2021 72 >=60 mL/min/1.73m² Final     eGFR   Date Value Ref Range Status   10/21/2020 65        MRI Lumbar Spine Without Contrast  Narrative: EXAMINATION:  MRI LUMBAR SPINE WITHOUT CONTRAST    CLINICAL HISTORY:  .  Radiculopathy, lumbar region    TECHNIQUE:  The lumbar spine was imaged in the sagittal and axial planes on a 1.5 Kristin magnet without IV contrast.  Sequences include T1, T2, and STIR images.    COMPARISON:  No previous lumbar imaging exam available for comparison at this time    FINDINGS:  There is no compression fracture.  Pedicular screws and rods with associated metallic artifact fuse the pedicles bilaterally at L4-L5 with satisfactory alignment.  There has also been laminectomy at the same level.    There is some 3-4 mm anterolisthesis of L3 with respect to L4 with mildly exaggerated lordosis at this level.  There is straightening of the spine otherwise.  There is mild scattered anterior spondylosis.  There is mild to moderate degenerative disc narrowing at L3-L4 with mild narrowing at L4-L5.  There is scattered mild disc desiccation.    Conus medullaris terminates at the lower L2 level and is without gross mass lesion.    There is no significant spinal or neural foraminal stenosis at T12-L1 through L2-L3.    There is moderate narrowing of the spinal canal at L3-L4 related to mild posterior diffuse disc bulging as well  as moderate ligamentum flavum and facet hypertrophy.  There is also moderate neural foraminal stenosis on the left and prominent neural foraminal stenosis on the right secondary to posterolateral bulging disc and facet arthropathy at this level.    There is no significant spinal or neural foraminal stenosis at L4-L5.    There is no significant spinal stenosis at L5-S1.  There is mild neural foraminal narrowing bilaterally at L5-S1 secondary to posterolateral bulging disc and osteophyte as well as facet hypertrophy.  There is moderate ligamentum flavum and facet hypertrophy.  Impression: Moderate spinal stenosis and right greater than left neural foraminal stenosis at L3-L4 related to posterior bulging disc, ligamentum flavum and facet hypertrophy, and grade 1 anterolisthesis.    Postsurgical changes at L4-L5    Degenerative disc disease    Electronically signed by: Geovany Arreola  Date:    10/21/2021  Time:    10:39     Ear Cerumen Removal    Date/Time: 10/10/2022 2:45 PM  Performed by: Nikki Bridges NP  Authorized by: Nikki Bridges NP     Consent Done?:  Yes (Verbal)    Local anesthetic:  None  Medication Used:  Other  Location details:  Both ears  Procedure type: curette and irrigation    Cerumen  Removal Results:  Cerumen completely removed  Patient tolerance:  Patient tolerated the procedure well with no immediate complications   Assessment and Plan (including Health Maintenance)      Problem List  Smart Sets  Document Outside HM   :    Plan:           Problem List Items Addressed This Visit          ENT    Acute non-recurrent pansinusitis - Primary    Current Assessment & Plan     Rocephin and Decadron given IM in clinic.   Zithromax as ordered and Ed a hist as needed.    Reviewed pathology of current symptoms, medication side effects/risk/benefits/directions on taking medications, and worsening or persistent symptoms that require follow up in next 2-3 days. Saline/steroid nasal sprays, antihistamine  use, increase fluid intake, and multivitamin/elderberry/Zinc use were recommended. May take Tylenol or Motrin as needed for pain and/or fever. Patient was instructed to take antibiotic as directed, complete entire course to avoid antibiotic resistance, and take OTC probiotic with antibiotic to prevent GI upset. Patient verbalized understanding of treatment plan and denies any questions.           Relevant Medications    chlorpheniramine-phenylephrine 4-10 mg per tablet    Impacted cerumen of both ears    Current Assessment & Plan     Bilat ears irrigated,. Patient tolerated well. Cerumen completely removed.          Relevant Orders    Ear Cerumen Removal       Other    Sleep related leg cramps    Relevant Medications    cyclobenzaprine (FLEXERIL) 10 MG tablet       Health Maintenance Topics with due status: Not Due       Topic Last Completion Date    DEXA Scan 12/02/2019    Lipid Panel 09/21/2021    Colorectal Cancer Screening 10/13/2021       Future Appointments   Date Time Provider Department Center   3/2/2023 10:00 AM INFUSION, [facility] INFUSN Sebastien DUNN        Health Maintenance Due   Topic Date Due    Hepatitis C Screening  Never done    TETANUS VACCINE  Never done    Shingles Vaccine (2 of 3) 12/10/2013    COVID-19 Vaccine (3 - Booster for Pfizer series) 04/28/2021    Mammogram  10/31/2021    Influenza Vaccine (1) 09/01/2022        Follow up in about 3 months (around 1/10/2023).     Signature:  Nikki Bridges NP  [facility]  [address] [place] [address], [place]  [address]    Date of encounter: 10/10/22

## 2022-10-11 RX ORDER — CYCLOBENZAPRINE HCL 10 MG
TABLET ORAL
Qty: 90 TABLET | Refills: 1 | OUTPATIENT
Start: 2022-10-11

## 2022-10-18 PROBLEM — J01.40 ACUTE NON-RECURRENT PANSINUSITIS: Status: ACTIVE | Noted: 2022-10-18

## 2022-10-18 PROBLEM — H61.23 IMPACTED CERUMEN OF BOTH EARS: Status: ACTIVE | Noted: 2022-10-18

## 2022-10-18 NOTE — ASSESSMENT & PLAN NOTE
Rocephin and Decadron given IM in clinic.   Zithromax as ordered and Ed a hist as needed.    Reviewed pathology of current symptoms, medication side effects/risk/benefits/directions on taking medications, and worsening or persistent symptoms that require follow up in next 2-3 days. Saline/steroid nasal sprays, antihistamine use, increase fluid intake, and multivitamin/elderberry/Zinc use were recommended. May take Tylenol or Motrin as needed for pain and/or fever. Patient was instructed to take antibiotic as directed, complete entire course to avoid antibiotic resistance, and take OTC probiotic with antibiotic to prevent GI upset. Patient verbalized understanding of treatment plan and denies any questions.

## 2022-10-31 DIAGNOSIS — I10 HYPERTENSION, UNSPECIFIED TYPE: ICD-10-CM

## 2022-11-03 ENCOUNTER — CLINICAL SUPPORT (OUTPATIENT)
Dept: FAMILY MEDICINE | Facility: CLINIC | Age: 72
End: 2022-11-03
Payer: COMMERCIAL

## 2022-11-03 DIAGNOSIS — Z23 ENCOUNTER FOR IMMUNIZATION: Primary | ICD-10-CM

## 2022-11-03 PROCEDURE — 90694 FLU VACCINE - QUADRIVALENT - ADJUVANTED: ICD-10-PCS | Mod: ,,, | Performed by: FAMILY MEDICINE

## 2022-11-03 PROCEDURE — G0008 FLU VACCINE - QUADRIVALENT - ADJUVANTED: ICD-10-PCS | Mod: ,,, | Performed by: FAMILY MEDICINE

## 2022-11-03 PROCEDURE — G0008 ADMIN INFLUENZA VIRUS VAC: HCPCS | Mod: ,,, | Performed by: FAMILY MEDICINE

## 2022-11-03 PROCEDURE — 90694 VACC AIIV4 NO PRSRV 0.5ML IM: CPT | Mod: ,,, | Performed by: FAMILY MEDICINE

## 2022-11-03 RX ORDER — OLMESARTAN MEDOXOMIL AND HYDROCHLOROTHIAZIDE 20/12.5 20; 12.5 MG/1; MG/1
1 TABLET ORAL DAILY
Qty: 90 TABLET | Refills: 1 | OUTPATIENT
Start: 2022-11-03

## 2022-11-09 DIAGNOSIS — Z71.89 COMPLEX CARE COORDINATION: ICD-10-CM

## 2022-11-14 LAB — BCS RECOMMENDATION EXT: ABNORMAL

## 2022-11-15 ENCOUNTER — OFFICE VISIT (OUTPATIENT)
Dept: FAMILY MEDICINE | Facility: CLINIC | Age: 72
End: 2022-11-15
Payer: COMMERCIAL

## 2022-11-15 VITALS
DIASTOLIC BLOOD PRESSURE: 78 MMHG | TEMPERATURE: 98 F | WEIGHT: 184.81 LBS | SYSTOLIC BLOOD PRESSURE: 126 MMHG | RESPIRATION RATE: 18 BRPM | BODY MASS INDEX: 30.79 KG/M2 | HEART RATE: 78 BPM | HEIGHT: 65 IN | OXYGEN SATURATION: 98 %

## 2022-11-15 DIAGNOSIS — M72.2 PLANTAR FASCIITIS: Primary | ICD-10-CM

## 2022-11-15 DIAGNOSIS — Z11.59 ENCOUNTER FOR HEPATITIS C SCREENING TEST FOR LOW RISK PATIENT: ICD-10-CM

## 2022-11-15 LAB — HCV AB SER QL: NORMAL

## 2022-11-15 PROCEDURE — 1159F MED LIST DOCD IN RCRD: CPT | Mod: ,,, | Performed by: NURSE PRACTITIONER

## 2022-11-15 PROCEDURE — 86803 HEPATITIS C AB TEST: CPT | Mod: ,,, | Performed by: CLINICAL MEDICAL LABORATORY

## 2022-11-15 PROCEDURE — 20551 NJX 1 TENDON ORIGIN/INSJ: CPT | Mod: ,,, | Performed by: NURSE PRACTITIONER

## 2022-11-15 PROCEDURE — 1160F PR REVIEW ALL MEDS BY PRESCRIBER/CLIN PHARMACIST DOCUMENTED: ICD-10-PCS | Mod: ,,, | Performed by: NURSE PRACTITIONER

## 2022-11-15 PROCEDURE — 1101F PT FALLS ASSESS-DOCD LE1/YR: CPT | Mod: ,,, | Performed by: NURSE PRACTITIONER

## 2022-11-15 PROCEDURE — 1160F RVW MEDS BY RX/DR IN RCRD: CPT | Mod: ,,, | Performed by: NURSE PRACTITIONER

## 2022-11-15 PROCEDURE — 3078F PR MOST RECENT DIASTOLIC BLOOD PRESSURE < 80 MM HG: ICD-10-PCS | Mod: ,,, | Performed by: NURSE PRACTITIONER

## 2022-11-15 PROCEDURE — 3288F FALL RISK ASSESSMENT DOCD: CPT | Mod: ,,, | Performed by: NURSE PRACTITIONER

## 2022-11-15 PROCEDURE — 3008F BODY MASS INDEX DOCD: CPT | Mod: ,,, | Performed by: NURSE PRACTITIONER

## 2022-11-15 PROCEDURE — 3074F PR MOST RECENT SYSTOLIC BLOOD PRESSURE < 130 MM HG: ICD-10-PCS | Mod: ,,, | Performed by: NURSE PRACTITIONER

## 2022-11-15 PROCEDURE — 1159F PR MEDICATION LIST DOCUMENTED IN MEDICAL RECORD: ICD-10-PCS | Mod: ,,, | Performed by: NURSE PRACTITIONER

## 2022-11-15 PROCEDURE — 3078F DIAST BP <80 MM HG: CPT | Mod: ,,, | Performed by: NURSE PRACTITIONER

## 2022-11-15 PROCEDURE — 86803 HEPATITIS C ANTIBODY: ICD-10-PCS | Mod: ,,, | Performed by: CLINICAL MEDICAL LABORATORY

## 2022-11-15 PROCEDURE — 3008F PR BODY MASS INDEX (BMI) DOCUMENTED: ICD-10-PCS | Mod: ,,, | Performed by: NURSE PRACTITIONER

## 2022-11-15 PROCEDURE — 3288F PR FALLS RISK ASSESSMENT DOCUMENTED: ICD-10-PCS | Mod: ,,, | Performed by: NURSE PRACTITIONER

## 2022-11-15 PROCEDURE — 20551 PR INJECT TENDON ORIGIN/INSERT: ICD-10-PCS | Mod: ,,, | Performed by: NURSE PRACTITIONER

## 2022-11-15 PROCEDURE — 3074F SYST BP LT 130 MM HG: CPT | Mod: ,,, | Performed by: NURSE PRACTITIONER

## 2022-11-15 PROCEDURE — 1101F PR PT FALLS ASSESS DOC 0-1 FALLS W/OUT INJ PAST YR: ICD-10-PCS | Mod: ,,, | Performed by: NURSE PRACTITIONER

## 2022-11-15 RX ORDER — TRIAMCINOLONE ACETONIDE 40 MG/ML
40 INJECTION, SUSPENSION INTRA-ARTICULAR; INTRAMUSCULAR
Status: DISCONTINUED | OUTPATIENT
Start: 2022-11-15 | End: 2022-11-15 | Stop reason: HOSPADM

## 2022-11-15 RX ORDER — LIDOCAINE HYDROCHLORIDE 10 MG/ML
1 INJECTION INFILTRATION; PERINEURAL
Status: DISCONTINUED | OUTPATIENT
Start: 2022-11-15 | End: 2022-11-15 | Stop reason: HOSPADM

## 2022-11-15 RX ADMIN — LIDOCAINE HYDROCHLORIDE 1 ML: 10 INJECTION INFILTRATION; PERINEURAL at 05:11

## 2022-11-15 RX ADMIN — TRIAMCINOLONE ACETONIDE 40 MG: 40 INJECTION, SUSPENSION INTRA-ARTICULAR; INTRAMUSCULAR at 05:11

## 2022-11-15 NOTE — PROGRESS NOTES
SHARON Hughes   [facility]  [address]  [place] [address]     PATIENT NAME: Delmis Shell  : 1950  DATE: 11/15/22  MRN: [id number]      Billing Provider: SHARON Hughes  Level of Service: [place] OFFICE/OUTPT VISIT, EST, LEVL III, 20-29 MIN  Patient PCP Information       Provider PCP Type    SHARON [facility]            Reason for Visit / Chief Complaint: Foot Pain (Foot pain bilaterally for the past 2 weeks.  Thinks she may have tendonitis.  Reports having this problem in the past.)       Update PCP  Update Chief Complaint         History of Present Illness / Problem Focused Workflow     Delmis Shell presents to the clinic c/o pain on the bottom of both of her feet; left > right. She states she has had tendonitis in the past.   Pain worse in the morning when she first gets oob and decreases once walks some.    Review of Systems     Review of Systems   Constitutional: Negative.  Negative for activity change, appetite change, chills, fatigue, fever and unexpected weight change.   Eyes:  Negative for visual disturbance.   Respiratory:  Negative for cough, chest tightness and shortness of breath.    Cardiovascular:  Negative for chest pain and leg swelling.   Gastrointestinal:  Negative for abdominal pain, blood in stool, change in bowel habit, nausea, vomiting and change in bowel habit.   Endocrine: Negative for cold intolerance, heat intolerance, polydipsia, polyphagia and polyuria.   Genitourinary:  Negative for frequency.   Musculoskeletal:  Positive for arthralgias (rusty foot pain).   Integumentary:  Negative for rash, breast discharge and breast tenderness.   Neurological:  Negative for dizziness, weakness and headaches.   Breast: Negative for tenderness     Medical / Social / Family History     Past Medical History:   Diagnosis Date    Arthritis     Hyperlipidemia     Hypertension     Left hip pain     Osteoporosis     Venous insufficiency, peripheral         Past Surgical History:   Procedure Laterality Date    EXCISION OF BREAST LESION Right 1970    KNEE ARTHROSCOPY Right 2020    low back disc surgery  2014    in [place]    TUBAL LIGATION      VAGINAL DELIVERY  1980    VAGINAL DELIVERY  1975    VENOUS ABLATION Right 09/18/2020    Anterior Shin Varithena Ablation performed by DR. Cesar Dumont.    VENOUS ABLATION Left 09/11/2020    Lower Anterior Shin Varithen Ablation performed by Dr. Cesar Dumont.       Social History  Ms.  reports that she has never smoked. She has never been exposed to tobacco smoke. She has never used smokeless tobacco. She reports that she does not currently use alcohol. She reports that she does not use drugs.    Family History  Ms.'s family history includes Breast cancer in her sister; Cancer in her maternal grandmother; Colon cancer in her brother and sister; Colon polyps in her sister; Diabetes in her father; Heart disease in her maternal grandfather; Kidney disease in her father; Stroke in her mother; Sudden death in her maternal grandfather.    Medications and Allergies     Medications  Outpatient Medications Marked as Taking for the 11/15/22 encounter (Office Visit) with SHARON Myers   Medication Sig Dispense Refill    aspirin (ECOTRIN) 81 MG EC tablet Take 81 mg by mouth once daily.      calcium carb and citrate-vitD3 600 mg calcium- 500 unit TbSR Take 1 tablet by mouth once daily.      cyclobenzaprine (FLEXERIL) 10 MG tablet Take 1 tablet (10 mg total) by mouth every evening. 90 tablet 1    denosumab (PROLIA) 60 mg/mL Syrg Inject 60 mg into the skin every 6 (six) months.      diclofenac sodium (VOLTAREN) 1 % Gel Apply 2 g topically once daily. 3 each 1    DUREZOL 0.05 % Drop ophthalmic solution       ergocalciferol (ERGOCALCIFEROL) 50,000 unit Cap Take 1 capsule (50,000 Units total) by mouth every 7 days. 12 capsule 1    fluticasone propionate (FLOVENT DISKUS) 50 mcg/actuation DsDv Inhale 1 spray into the lungs 2 (two)  "times a day. Controller 3 each 1    gabapentin (NEURONTIN) 100 MG capsule Take 100 mg by mouth 3 (three) times daily.      garlic 1,000 mg Cap Take 1 capsule by mouth once daily.      hydrOXYzine pamoate (VISTARIL) 25 MG Cap Take 1 capsule (25 mg total) by mouth 2 (two) times daily as needed (itching). 180 capsule 1    loratadine (CLARITIN) 10 mg tablet Take 1 tablet (10 mg total) by mouth once daily. 90 tablet 1    lovastatin (MEVACOR) 40 MG tablet TAKE 1 TABLET BY MOUTH EVERY EVENING 90 tablet 0    meloxicam (MOBIC) 15 MG tablet Take 1 tablet (15 mg total) by mouth once daily. (Patient taking differently: Take 15 mg by mouth daily as needed.) 90 tablet 1    montelukast (SINGULAIR) 10 mg tablet Take 1 tablet (10 mg total) by mouth every evening. 90 tablet 1    olmesartan-hydrochlorothiazide (BENICAR HCT) 20-12.5 mg per tablet Take 1 tablet by mouth once daily. 90 tablet 1    potassium chloride (KLOR-CON) 10 MEQ TbSR TAKE 1 TABLET BY MOUTH ONCE DAILY 90 tablet 0    traMADoL (ULTRAM) 50 mg tablet Take 1 tablet (50 mg total) by mouth every 12 (twelve) hours as needed for Pain. 12 tablet 0    triamcinolone acetonide 0.1% (KENALOG) 0.1 % cream Apply topically 2 (two) times daily. 28 g 2       Allergies  Review of patient's allergies indicates:  No Known Allergies    Physical Examination     Vitals:    11/15/22 1420   BP: 126/78   BP Location: Left arm   Patient Position: Sitting   BP Method: Large (Manual)   Pulse: 78   Resp: 18   Temp: 97.8 °F (36.6 °C)   TempSrc: Oral   SpO2: 98%   Weight: 83.8 kg (184 lb 12.8 oz)   Height: 5' 5" (1.651 m)      Physical Exam  Constitutional:       General: She is not in acute distress.     Appearance: Normal appearance.   Eyes:      Conjunctiva/sclera: Conjunctivae normal.   Neck:      Thyroid: No thyromegaly.      Vascular: No carotid bruit.   Cardiovascular:      Rate and Rhythm: Normal rate and regular rhythm.      Pulses: Normal pulses.      Heart sounds: Normal heart sounds. No " murmur heard.  Pulmonary:      Effort: Pulmonary effort is normal. No accessory muscle usage or respiratory distress.      Breath sounds: Normal breath sounds. No wheezing, rhonchi or rales.   Abdominal:      General: Bowel sounds are normal.      Palpations: Abdomen is soft.      Tenderness: There is no abdominal tenderness.   Musculoskeletal:         General: Normal range of motion.      Cervical back: Neck supple.      Right foot: Normal range of motion and normal capillary refill. Tenderness present. No swelling or deformity. Normal pulse.      Left foot: Normal range of motion and normal capillary refill. Tenderness present. No swelling or deformity. Normal pulse.      Comments: Pain midline heel with direct pressure. No erythema, edema or ecchymosis   Skin:     General: Skin is warm and dry.      Coloration: Skin is not jaundiced or pale.   Neurological:      Mental Status: She is alert and oriented to person, place, and time.      Sensory: Sensation is intact.      Motor: Motor function is intact.      Gait: Gait is intact.        Assessment and Plan (including Health Maintenance)      Problem List  Smart Sets  Document Outside HM   :          Health Maintenance Due   Topic Date Due    Hepatitis C Screening  Never done    TETANUS VACCINE  Never done    Shingles Vaccine (2 of 3) 12/10/2013    COVID-19 Vaccine (3 - Booster for Pfizer series) 04/28/2021    Mammogram  10/31/2021    DEXA Scan  12/02/2022       Problem List Items Addressed This Visit    None  Visit Diagnoses       Plantar fasciitis    -  Primary    See procedure note; will refer to PT and exercise information given. Continue mobic daily    Relevant Orders    Ambulatory referral/consult to Physical/Occupational Therapy    Encounter for hepatitis C screening test for low risk patient        Relevant Orders    Hepatitis C Antibody          Plantar fasciitis  Comments:  See procedure note; will refer to PT and exercise information given. Continue mobic  daily  Orders:  -     Ambulatory referral/consult to Physical/Occupational Therapy; Future; Expected date: 11/22/2022    Encounter for hepatitis C screening test for low risk patient  -     Hepatitis C Antibody; Future; Expected date: 11/15/2022    Other orders  -     Trigger Point Injection     Health Maintenance Topics with due status: Not Due       Topic Last Completion Date    Lipid Panel 09/21/2021    Colorectal Cancer Screening 10/13/2021       Trigger Point Injection  Performed by: SHARON Myers  Authorized by: SHARON Myers       Consent Done?:  Yes (Verbal)    Pre-Procedure:   Indications:  Pain  Site marked: the procedure site was marked     Timeout: prior to procedure the correct patient, procedure, and site was verified    Prep: patient was prepped and draped in usual sterile fashion     Local anesthesia used?: Yes    Local anesthetic:  Lidocaine spray  Medications: 1 mL LIDOcaine HCL 10 mg/ml (1%) 10 mg/mL (1 %); 40 mg triamcinolone acetonide 40 mg/mL     Future Appointments   Date Time Provider Department Center   3/2/2023 10:00 AM INFUSION, [facility] INFUS[facility]IND DUNN        Follow up in about 1 month (around 12/15/2022), or if symptoms worsen or fail to improve.     Signature:  SHARON Hughes    Date of encounter: 11/15/22

## 2022-11-29 ENCOUNTER — CLINICAL SUPPORT (OUTPATIENT)
Dept: REHABILITATION | Facility: HOSPITAL | Age: 72
End: 2022-11-29
Payer: COMMERCIAL

## 2022-11-29 DIAGNOSIS — M72.2 BILATERAL PLANTAR FASCIITIS: ICD-10-CM

## 2022-11-29 DIAGNOSIS — M81.0 AGE-RELATED OSTEOPOROSIS WITHOUT CURRENT PATHOLOGICAL FRACTURE: Primary | ICD-10-CM

## 2022-11-29 PROCEDURE — 97110 THERAPEUTIC EXERCISES: CPT | Mod: PN

## 2022-11-29 PROCEDURE — 97161 PT EVAL LOW COMPLEX 20 MIN: CPT | Mod: PN

## 2022-11-29 NOTE — PLAN OF CARE
[facility]   Physical Therapy Initial Evaluation    Date: 11/29/2022   Name: Delmis Shell  Clinic Number: [id number]    Therapy Diagnosis:   Encounter Diagnoses   Name Primary?    Bilateral plantar fasciitis     Age-related osteoporosis without current pathological fracture Yes     Physician: Linda Jacobs FNP    Physician Orders: PT Eval and Treat    Medical Diagnosis from Referral: bilateral plantar fasciitis   Evaluation Date: 11/29/2022  Updated Plan of Care Due : 12/28/2022  Authorization Period Expiration: wellcare   Plan of Care Expiration: [facility] approved 8 visits plus eval through 1/31/2022  Visit # / Visits authorized: 1/ 9    Time In: 930  Time Out: 1020  Total Appointment Time (timed & untimed codes): 50 minutes    Precautions: Standard    Subjective   Date of onset: pt states she had bilateral plantar fasciitis about 15 years ago and it started coming back about a month ago, pt voices worst pain in the morning when she gets up in the morning . Pt voices burning in the bottom of bilateral feet with the right being worse than the left. Pt voices pain with palpation of bilateral plantar fascia .   History of current condition - Delmis reports: hx below     Medical History:   Past Medical History:   Diagnosis Date    Arthritis     Hyperlipidemia     Hypertension     Left hip pain     Osteoporosis     Venous insufficiency, peripheral        Surgical History:   Delmis Shell  has a past surgical history that includes Excision of breast lesion (Right, 1970); Knee arthroscopy (Right, 2020); low back disc surgery (2014); Tubal ligation; Vaginal delivery (1980); Vaginal delivery (1975); Venous ablation (Right, 09/18/2020); and Venous ablation (Left, 09/11/2020).    Medications:   Delmis has a current medication list which includes the following prescription(s): aspirin, calcium carb and citrate-vitd3, cyclobenzaprine, prolia, diclofenac sodium, durezol, ergocalciferol,  fluticasone propionate, gabapentin, garlic, hydroxyzine pamoate, loratadine, lovastatin, meloxicam, montelukast, olmesartan-hydrochlorothiazide, potassium chloride, tramadol, and triamcinolone acetonide 0.1%.    Allergies:   Review of patient's allergies indicates:  No Known Allergies     Imaging, none:      Prior Therapy: none   Social History:   lives with their spouse  Occupation: retired   Prior Level of Function: independent   Current Level of Function: pain with standing and walking     Pain:  Current 6/10, worst 10/10, best 4/10   Location: bilateral feet  generalized  and plantar fascia    Description: Aching, Dull, Burning, Throbbing, Tight, and Deep  Aggravating Factors: Standing and Walking  Easing Factors: nothing and rest    Patients goals: pt would like to walk and stand pain free.     Objective         Observation : tight bilateral plantar fasciitis     Pronation/Supination : Right                                           Left     Incision :    n/a              Range of Motion/Strength :                  Left Extremity                                                                        Right Extremity   AROM PROM Strength  Location  AROM    PROM   Strength   95  3+   Hip      Flexion 90  3+                                        -50  3+ Hamstring length  -50  3+   130  3+   Knee    Flexion 130  3+   -10  3+                Extension -15  3+   -5  3+   Ankle   Dorsiflexion -5  3+   40  3+                Plantar Flexion 40  3+   12  3+                Inversion 15  3+   5  3+                Eversion 8  3+                  Functional Impairments :        Limitation/Restriction for FOTO foot Survey    Therapist reviewed FOTO scores for Delmis Shell on 11/29/2022.   FOTO documents entered into unta[employer] - see Media section.    Limitation Score: 54%         TREATMENT          Delmis received the treatments listed below:  THERAPEUTIC EXERCISES to develop strength, endurance, ROM, flexibility, posture,  and ultrasound to bilateral plantar fascia  for 20 minutes including home ex program .         Home Exercises and Patient Education Provided    Education provided:   - Pt performed and received home ex program.     Written Home Exercises Provided: Patient instructed to cont prior HEP.  Exercises were reviewed and Delmis was able to demonstrate them prior to the end of the session.  Delmis demonstrated good  understanding of the education provided.     See EMR under Patient Instructions for exercises provided 11/29/2022.    Assessment   Delmis is a 72 y.o. female referred to outpatient Physical Therapy with a medical diagnosis of bilateral plantar fasciitis . Patient presents with decreased flexibility in hips, hamstring, gastroc/soleus, plantar fascia     Patient prognosis is Excellent.   Patientt will benefit from skilled outpatient Physical Therapy to address the deficits stated above and in the chart below, provide patient /family education, and to maximize patientt's level of independence.     Plan of care discussed with patient: Yes  Patient's spiritual, cultural and educational needs considered and patient is agreeable to the plan of care and goals as stated below:     Anticipated Barriers for therapy: bilateral plantar fasciitis . Patient presents with decreased flexibility in hips, hamstring, gastroc/soleus, plantar fascia     Goals:  Short Term Goals: 4 weeks   Pt will be independent with home ex program   Pt will increase bilateral dorsiflexion to 10 degrees   Pt will hamstring length to -30 degrees   Decrease pain when awaking to 6/10 to 4/10     Long Term Goals: 6 weeks   Pt will be able to walk in the morning with pain less than 2/10   Pt will be able to shop greater than 30 min or do yardwork pain free.       Plan   Plan of care Certification: 11/29/2022 to 12/28/2022.    Outpatient Physical Therapy 2 times weekly for 4 weeks to include the following interventions: Patient Education, Therapeutic  Exercise, Ultrasound, and modalities as needed .     Plan of care has been reestablished with Lakshmi PEÑA and Adele PEÑA.       Keshav Maldonado, PT

## 2022-12-02 ENCOUNTER — CLINICAL SUPPORT (OUTPATIENT)
Dept: REHABILITATION | Facility: HOSPITAL | Age: 72
End: 2022-12-02
Payer: COMMERCIAL

## 2022-12-02 DIAGNOSIS — M25.671 DECREASED RANGE OF MOTION OF BOTH ANKLES: ICD-10-CM

## 2022-12-02 DIAGNOSIS — M72.2 BILATERAL PLANTAR FASCIITIS: Primary | ICD-10-CM

## 2022-12-02 DIAGNOSIS — M25.672 DECREASED RANGE OF MOTION OF BOTH ANKLES: ICD-10-CM

## 2022-12-02 PROCEDURE — 97035 APP MDLTY 1+ULTRASOUND EA 15: CPT | Mod: PN,CQ

## 2022-12-02 PROCEDURE — 97110 THERAPEUTIC EXERCISES: CPT | Mod: PN,CQ

## 2022-12-02 NOTE — PROGRESS NOTES
Physical Therapy Treatment Note     Name: Delmis Shell  Clinic Number: [id number]    Therapy Diagnosis: No diagnosis found.  Physician: Linda Jacobs, SHARON    Visit Date: 12/2/2022    Physician Orders: PT Eval and Treat    Medical Diagnosis from Referral: bilateral plantar fasciitis   Evaluation Date: 11/29/2022  Updated Plan of Care Due : 12/28/2022  Authorization Period Expiration: wellcare   Plan of Care Expiration: [facility] approved 8 visits plus eval through 1/31/2022  Visit # / Visits authorized: 2/ 9  PTA Visit #: 1    Time In: 1027  Time Out: 1105  Total Billable Time:   38 minutes    Precautions: Standard  Plan of care reviewed with Keshav Maldonado PT.      Subjective     Pt reports: I'm still having having pain..  She was compliant with home exercise program.  Response to previous treatment: sore  Functional change: none noted     Pain: 7/10 right ankle   3/10 left ankle  Location: bilateral ankles     Objective     Delmis received therapeutic exercises to develop strength, endurance, ROM, and flexibility for 30 minutes including:  Bike x 5'  Slant board x 2'  Standing calf raises x 15  Bilateral ankle dorsiflexion,eversion,inversion all blue x 10  Bilateral alphabet 1 x 3#    Range of motion  Dorsiflexion right  4     left   4  Inversion   right   16       left 13  Eversion      right  9     left 6    Delmis received the following manual therapy techniques: passive range of motion were applied to the: bilateral ankles for 6 minutes        Delmis received the following direct contact modalities after being cleared for contraindications: Ultrasound:  Delmis received ultrasound to manage pain and inflammation at 100 % duty cycle applied to the bilateral plantar fascia  at an intensity of 1.5 W/cm2 / 1mhz for a duration of 8 minutes. Patient tolerated treatment well without adverse effects. Therapist was in attendance throughout intervention.    Home Exercises Provided and Patient Education  Provided     Education provided: home exercise program     Written Home Exercises Provided: Patient instructed to cont prior HEP.  Exercises were reviewed and Delmis was able to demonstrate them prior to the end of the session.  Delmis demonstrated good  understanding of the education provided.     See EMR under Patient Instructions for exercises provided prior visit.    Assessment     Patient voicing decreased pain 5/10 on right.  Delmis Is progressing well towards her goals.   Pt prognosis is Excellent.     Pt will continue to benefit from skilled outpatient physical therapy to address the deficits listed in the problem list box on initial evaluation, provide pt/family education and to maximize pt's level of independence in the home and community environment.     Pt's spiritual, cultural and educational needs considered and pt agreeable to plan of care and goals.     Anticipated barriers to physical therapy: compliance with home exercise program     Goals:  Short Term Goals: 4 weeks   Pt will be independent with home ex program   Pt will increase bilateral dorsiflexion to 10 degrees   Pt will hamstring length to -30 degrees   Decrease pain when awaking to 6/10 to 4/10      Long Term Goals: 6 weeks   Pt will be able to walk in the morning with pain less than 2/10   Pt will be able to shop greater than 30 min or do yardwork pain free.     Plan     Plan of care Certification: 11/29/2022 to 12/28/2022.     Outpatient Physical Therapy 2 times weekly for 4 weeks to include the following interventions: Patient Education, Therapeutic Exercise, Ultrasound, and modalities as needed .   Ronda Rodriguez, PTA  12/2/2022

## 2022-12-05 DIAGNOSIS — E55.9 VITAMIN D DEFICIENCY: ICD-10-CM

## 2022-12-06 ENCOUNTER — CLINICAL SUPPORT (OUTPATIENT)
Dept: REHABILITATION | Facility: HOSPITAL | Age: 72
End: 2022-12-06
Payer: COMMERCIAL

## 2022-12-06 DIAGNOSIS — M25.671 DECREASED RANGE OF MOTION OF BOTH ANKLES: ICD-10-CM

## 2022-12-06 DIAGNOSIS — M25.672 DECREASED RANGE OF MOTION OF BOTH ANKLES: ICD-10-CM

## 2022-12-06 DIAGNOSIS — M72.2 BILATERAL PLANTAR FASCIITIS: Primary | ICD-10-CM

## 2022-12-06 PROCEDURE — 97035 APP MDLTY 1+ULTRASOUND EA 15: CPT | Mod: PN,CQ

## 2022-12-06 PROCEDURE — 97110 THERAPEUTIC EXERCISES: CPT | Mod: PN,CQ

## 2022-12-06 NOTE — PROGRESS NOTES
Physical Therapy Treatment Note     Name: Delmis Shell  Clinic Number: [id number]    Therapy Diagnosis:   Encounter Diagnoses   Name Primary?    Bilateral plantar fasciitis Yes    Decreased range of motion of both ankles      Physician: Linda Jacobs FNP    Visit Date: 12/6/2022    Physician Orders: PT Eval and Treat    Medical Diagnosis from Referral: bilateral plantar fasciitis   Evaluation Date: 11/29/2022  Updated Plan of Care Due : 12/28/2022  Authorization Period Expiration: wellcare   Plan of Care Expiration: [facility] approved 8 visits plus eval through 1/31/2022  Visit # / Visits authorized: 3/ 9  PTA Visit #: 2    Time In: 1010  Time Out: 1048  Total Billable Time:   38 minutes    Precautions: Standard     Subjective     Pt reports: I'm doing pretty good this am.  She was compliant with home exercise program.  Response to previous treatment:   Functional change: decreased pain with walking    Pain: 0/10 right ankle   1/10 left ankle  Location: bilateral ankles     Objective     Delmis received therapeutic exercises to develop strength, endurance, ROM, and flexibility for 30 minutes including:    Bike x 5'  Slant board x 2'  Standing calf raises x 15  Bilateral ankle dorsiflexion,eversion,inversion all blue x 10  Bilateral alphabet 1 x 3#    Range of motion  Dorsiflexion right  9        left   4  Inversion   right   16       left 13  Eversion      right  10       left 7    Delmis received the following manual therapy techniques: passive range of motion were applied to the: bilateral ankles for 6 minutes    Delmis received the following direct contact modalities after being cleared for contraindications: Ultrasound:  Delmis received ultrasound to manage pain and inflammation at 100 % duty cycle applied to the right  plantar fascia  at an intensity of 1.5 W/cm2 / 1mhz for a duration of 8 minutes. Patient tolerated treatment well without adverse effects. Therapist was in attendance throughout  intervention.    Home Exercises Provided and Patient Education Provided     Education provided: home exercise program     Written Home Exercises Provided: Patient instructed to cont prior HEP.  Exercises were reviewed and Delmis was able to demonstrate them prior to the end of the session.  Delmis demonstrated good  understanding of the education provided.     See EMR under Patient Instructions for exercises provided prior visit.    Assessment     Patient progressing with ankle strengthening and range of motion.  Delmis Is progressing well towards her goals.   Pt prognosis is Excellent.     Pt will continue to benefit from skilled outpatient physical therapy to address the deficits listed in the problem list box on initial evaluation, provide pt/family education and to maximize pt's level of independence in the home and community environment.     Pt's spiritual, cultural and educational needs considered and pt agreeable to plan of care and goals.     Anticipated barriers to physical therapy: compliance with home exercise program     Goals:  Short Term Goals: 4 weeks   Pt will be independent with home ex program   Pt will increase bilateral dorsiflexion to 10 degrees   Pt will hamstring length to -30 degrees   Decrease pain when awaking to 6/10 to 4/10      Long Term Goals: 6 weeks   Pt will be able to walk in the morning with pain less than 2/10   Pt will be able to shop greater than 30 min or do yardwork pain free.     Plan     Plan of care Certification: 11/29/2022 to 12/28/2022.     Outpatient Physical Therapy 2 times weekly for 4 weeks to include the following interventions: Patient Education, Therapeutic Exercise, Ultrasound, and modalities as needed .   Ronda Rodriguez, PTA  12/6/2022

## 2022-12-07 ENCOUNTER — OFFICE VISIT (OUTPATIENT)
Dept: FAMILY MEDICINE | Facility: CLINIC | Age: 72
End: 2022-12-07
Payer: COMMERCIAL

## 2022-12-07 VITALS
OXYGEN SATURATION: 98 % | DIASTOLIC BLOOD PRESSURE: 78 MMHG | SYSTOLIC BLOOD PRESSURE: 132 MMHG | BODY MASS INDEX: 30.82 KG/M2 | HEIGHT: 65 IN | TEMPERATURE: 97 F | HEART RATE: 74 BPM | RESPIRATION RATE: 20 BRPM | WEIGHT: 185 LBS

## 2022-12-07 DIAGNOSIS — R53.83 FATIGUE, UNSPECIFIED TYPE: ICD-10-CM

## 2022-12-07 DIAGNOSIS — I10 HYPERTENSION, UNSPECIFIED TYPE: ICD-10-CM

## 2022-12-07 DIAGNOSIS — G47.62 SLEEP RELATED LEG CRAMPS: ICD-10-CM

## 2022-12-07 DIAGNOSIS — E55.9 VITAMIN D DEFICIENCY: Primary | ICD-10-CM

## 2022-12-07 DIAGNOSIS — E55.9 VITAMIN D DEFICIENCY: ICD-10-CM

## 2022-12-07 PROBLEM — J01.40 ACUTE NON-RECURRENT PANSINUSITIS: Status: RESOLVED | Noted: 2022-10-18 | Resolved: 2022-12-07

## 2022-12-07 PROBLEM — H61.23 IMPACTED CERUMEN OF BOTH EARS: Status: RESOLVED | Noted: 2022-10-18 | Resolved: 2022-12-07

## 2022-12-07 PROCEDURE — 1160F PR REVIEW ALL MEDS BY PRESCRIBER/CLIN PHARMACIST DOCUMENTED: ICD-10-PCS | Mod: ,,, | Performed by: NURSE PRACTITIONER

## 2022-12-07 PROCEDURE — 99213 PR OFFICE/OUTPT VISIT, EST, LEVL III, 20-29 MIN: ICD-10-PCS | Mod: ,,, | Performed by: NURSE PRACTITIONER

## 2022-12-07 PROCEDURE — 1160F RVW MEDS BY RX/DR IN RCRD: CPT | Mod: ,,, | Performed by: NURSE PRACTITIONER

## 2022-12-07 PROCEDURE — 3075F PR MOST RECENT SYSTOLIC BLOOD PRESS GE 130-139MM HG: ICD-10-PCS | Mod: ,,, | Performed by: NURSE PRACTITIONER

## 2022-12-07 PROCEDURE — 99213 OFFICE O/P EST LOW 20 MIN: CPT | Mod: ,,, | Performed by: NURSE PRACTITIONER

## 2022-12-07 PROCEDURE — 1159F PR MEDICATION LIST DOCUMENTED IN MEDICAL RECORD: ICD-10-PCS | Mod: ,,, | Performed by: NURSE PRACTITIONER

## 2022-12-07 PROCEDURE — 3078F PR MOST RECENT DIASTOLIC BLOOD PRESSURE < 80 MM HG: ICD-10-PCS | Mod: ,,, | Performed by: NURSE PRACTITIONER

## 2022-12-07 PROCEDURE — 3075F SYST BP GE 130 - 139MM HG: CPT | Mod: ,,, | Performed by: NURSE PRACTITIONER

## 2022-12-07 PROCEDURE — 3078F DIAST BP <80 MM HG: CPT | Mod: ,,, | Performed by: NURSE PRACTITIONER

## 2022-12-07 PROCEDURE — 3008F BODY MASS INDEX DOCD: CPT | Mod: ,,, | Performed by: NURSE PRACTITIONER

## 2022-12-07 PROCEDURE — 3008F PR BODY MASS INDEX (BMI) DOCUMENTED: ICD-10-PCS | Mod: ,,, | Performed by: NURSE PRACTITIONER

## 2022-12-07 PROCEDURE — 1159F MED LIST DOCD IN RCRD: CPT | Mod: ,,, | Performed by: NURSE PRACTITIONER

## 2022-12-07 RX ORDER — ERGOCALCIFEROL 1.25 MG/1
50000 CAPSULE ORAL
Qty: 12 CAPSULE | Refills: 1 | Status: SHIPPED | OUTPATIENT
Start: 2022-12-07 | End: 2023-03-07 | Stop reason: SDUPTHER

## 2022-12-07 RX ORDER — ERGOCALCIFEROL 1.25 MG/1
CAPSULE ORAL
Qty: 12 CAPSULE | Refills: 1 | OUTPATIENT
Start: 2022-12-07

## 2022-12-07 NOTE — PROGRESS NOTES
Nikki Bridges NP   [place] [address] [facility] [address]  [place]  [address]      PATIENT NAME: Delmis Shell  : 1950  DATE: 22  MRN: [id number]      Billing Provider: Nikki Bridges NP  Level of Service:   Patient PCP Information       Provider PCP Type    SH[facility]            Reason for Visit / Chief Complaint: Fatigue (Patient complaints of fatigue, falling asleep every time she sits down. )       Update PCP  Update Chief Complaint         History of Present Illness / Problem Focused Workflow     Delmis Shell presents to the clinic with Fatigue (Patient complaints of fatigue, falling asleep every time she sits down. )     72 year old female presents to clinic with complaints of fatigue. She states every time she sits down she falls asleep. She states she sleeps well at night and does not snore. She wakes up feeling refreshed in the morning per her report. She does report increased leg cramping at night and wants her potassium checked. Patient also reports she needs to have Vitamin D level checked. She has been on therapy for quite a while but has not had it checked.       Review of Systems     @Review of Systems   Constitutional:  Positive for fatigue. Negative for activity change, appetite change and fever.   HENT:  Negative for nasal congestion, ear pain, rhinorrhea, sinus pressure/congestion and sore throat.    Eyes:  Negative for pain, redness, visual disturbance and eye dryness.   Respiratory:  Negative for cough and shortness of breath.    Cardiovascular:  Negative for chest pain and leg swelling.   Gastrointestinal:  Negative for abdominal distention, abdominal pain, constipation and diarrhea.   Endocrine: Negative for cold intolerance, heat intolerance and polyuria.   Genitourinary:  Negative for bladder incontinence, dysuria, frequency and urgency.   Musculoskeletal:  Negative for arthralgias, gait problem and myalgias.   Integumentary:  Negative  for color change, rash and wound.   Allergic/Immunologic: Negative for environmental allergies and food allergies.   Neurological:  Negative for dizziness, weakness, light-headedness and headaches.   Psychiatric/Behavioral:  Negative for behavioral problems and sleep disturbance.      Medical / Social / Family History     Past Medical History:   Diagnosis Date    Arthritis     Hyperlipidemia     Hypertension     Left hip pain     Osteoporosis     Venous insufficiency, peripheral        Past Surgical History:   Procedure Laterality Date    EXCISION OF BREAST LESION Right 1970    KNEE ARTHROSCOPY Right 2020    low back disc surgery  2014    in [place]    TUBAL LIGATION      VAGINAL DELIVERY  1980    VAGINAL DELIVERY  1975    VENOUS ABLATION Right 09/18/2020    Anterior Shin Varithena Ablation performed by DR. Cesar Dumont.    VENOUS ABLATION Left 09/11/2020    Lower Anterior Shin Varithen Ablation performed by Dr. Cesar Dumont.       Social History  Ms.  reports that she has never smoked. She has never been exposed to tobacco smoke. She has never used smokeless tobacco. She reports that she does not currently use alcohol. She reports that she does not use drugs.    Family History  Ms.'s family history includes Breast cancer in her sister; Cancer in her maternal grandmother; Colon cancer in her brother and sister; Colon polyps in her sister; Diabetes in her father; Heart disease in her maternal grandfather; Kidney disease in her father; Stroke in her mother; Sudden death in her maternal grandfather.    Medications and Allergies     Medications  No outpatient medications have been marked as taking for the 12/7/22 encounter (Office Visit) with Nikki Bridegs NP.       Allergies  Review of patient's allergies indicates:  No Known Allergies    Physical Examination     Vitals:    12/07/22 1700   BP: 132/78   Pulse: 74   Resp: 20   Temp: 97.2 °F (36.2 °C)     Physical Exam  Vitals and nursing note reviewed.   HENT:       Head: Normocephalic.      Right Ear: Tympanic membrane normal.      Left Ear: Tympanic membrane normal.      Nose: Nose normal.      Mouth/Throat:      Mouth: Mucous membranes are moist.      Pharynx: Oropharynx is clear. No posterior oropharyngeal erythema.   Eyes:      Conjunctiva/sclera: Conjunctivae normal.   Cardiovascular:      Rate and Rhythm: Normal rate and regular rhythm.      Pulses: Normal pulses.      Heart sounds: Normal heart sounds.   Pulmonary:      Effort: Pulmonary effort is normal.      Breath sounds: Normal breath sounds.   Abdominal:      General: Abdomen is flat. Bowel sounds are normal. There is no distension.      Palpations: Abdomen is soft.   Musculoskeletal:         General: No swelling or tenderness. Normal range of motion.      Cervical back: Normal range of motion.      Right lower leg: No edema.      Left lower leg: No edema.   Skin:     General: Skin is warm and dry.      Capillary Refill: Capillary refill takes less than 2 seconds.   Neurological:      Mental Status: She is alert. Mental status is at baseline.   Psychiatric:         Mood and Affect: Mood normal.         Behavior: Behavior normal.             Lab Results   Component Value Date    WBC 5.08 03/16/2022    HGB 12.1 03/16/2022    HCT 39.9 03/16/2022    MCV 91.7 03/16/2022     03/16/2022          Sodium   Date Value Ref Range Status   09/21/2021 139 136 - 145 mmol/L Final     Potassium   Date Value Ref Range Status   09/21/2021 4.1 3.5 - 5.1 mmol/L Final     Chloride   Date Value Ref Range Status   09/21/2021 103 98 - 107 mmol/L Final     CO2   Date Value Ref Range Status   09/21/2021 28 21 - 32 mmol/L Final     Glucose   Date Value Ref Range Status   09/21/2021 86 74 - 106 mg/dL Final     BUN   Date Value Ref Range Status   09/21/2021 19 (H) 7 - 18 mg/dL Final     Creatinine   Date Value Ref Range Status   09/21/2021 0.98 0.55 - 1.02 mg/dL Final     Calcium   Date Value Ref Range Status   08/30/2022 9.5 8.5 - 10.1  mg/dL Final     Total Protein   Date Value Ref Range Status   09/21/2021 7.5 6.4 - 8.2 g/dL Final     Albumin   Date Value Ref Range Status   09/21/2021 3.4 (L) 3.5 - 5.0 g/dL Final     Bilirubin, Total   Date Value Ref Range Status   09/21/2021 0.3 >0.0 - 1.2 mg/dL Final     Alk Phos   Date Value Ref Range Status   09/21/2021 68 55 - 142 U/L Final     AST   Date Value Ref Range Status   09/21/2021 20 15 - 37 U/L Final     ALT   Date Value Ref Range Status   09/21/2021 27 13 - 56 U/L Final     Anion Gap   Date Value Ref Range Status   09/21/2021 12 7 - 16 mmol/L Final     eGFR    Date Value Ref Range Status   09/21/2021 72 >=60 mL/min/1.73m² Final     eGFR   Date Value Ref Range Status   10/21/2020 65        MRI Lumbar Spine Without Contrast  Narrative: EXAMINATION:  MRI LUMBAR SPINE WITHOUT CONTRAST    CLINICAL HISTORY:  .  Radiculopathy, lumbar region    TECHNIQUE:  The lumbar spine was imaged in the sagittal and axial planes on a 1.5 Kristin magnet without IV contrast.  Sequences include T1, T2, and STIR images.    COMPARISON:  No previous lumbar imaging exam available for comparison at this time    FINDINGS:  There is no compression fracture.  Pedicular screws and rods with associated metallic artifact fuse the pedicles bilaterally at L4-L5 with satisfactory alignment.  There has also been laminectomy at the same level.    There is some 3-4 mm anterolisthesis of L3 with respect to L4 with mildly exaggerated lordosis at this level.  There is straightening of the spine otherwise.  There is mild scattered anterior spondylosis.  There is mild to moderate degenerative disc narrowing at L3-L4 with mild narrowing at L4-L5.  There is scattered mild disc desiccation.    Conus medullaris terminates at the lower L2 level and is without gross mass lesion.    There is no significant spinal or neural foraminal stenosis at T12-L1 through L2-L3.    There is moderate narrowing of the spinal canal at L3-L4 related  to mild posterior diffuse disc bulging as well as moderate ligamentum flavum and facet hypertrophy.  There is also moderate neural foraminal stenosis on the left and prominent neural foraminal stenosis on the right secondary to posterolateral bulging disc and facet arthropathy at this level.    There is no significant spinal or neural foraminal stenosis at L4-L5.    There is no significant spinal stenosis at L5-S1.  There is mild neural foraminal narrowing bilaterally at L5-S1 secondary to posterolateral bulging disc and osteophyte as well as facet hypertrophy.  There is moderate ligamentum flavum and facet hypertrophy.  Impression: Moderate spinal stenosis and right greater than left neural foraminal stenosis at L3-L4 related to posterior bulging disc, ligamentum flavum and facet hypertrophy, and grade 1 anterolisthesis.    Postsurgical changes at L4-L5    Degenerative disc disease    Electronically signed by: Geovany Arreola  Date:    10/21/2021  Time:    10:39     Procedures   Assessment and Plan (including Health Maintenance)      Problem List  Smart Sets  Document Outside HM   :    Plan:           Problem List Items Addressed This Visit          Cardiac/Vascular    Hypertension    Relevant Orders    CBC Auto Differential    Comprehensive Metabolic Panel       Endocrine    Vitamin D deficiency - Primary    Current Assessment & Plan     Vitamin D level obtained. Continue ergocalciferol at current dosage. Will follow up with labs.          Relevant Orders    Vitamin D       Other    Sleep related leg cramps    Current Assessment & Plan     Patient reports leg cramps have worsened at night over the last couple days. We will recheck CMP today.          Fatigue       Health Maintenance Topics with due status: Not Due       Topic Last Completion Date    Lipid Panel 09/21/2021    Colorectal Cancer Screening 10/13/2021       Future Appointments   Date Time Provider Department Center   12/9/2022 10:15 AM Ronda BARBER  [facility] Rodriguez [facility]   3/2/2023 10:00 AM [facility], [facility]        Health Maintenance Due   Topic Date Due    TETANUS VACCINE  Never done    Shingles Vaccine (2 of 3) 12/10/2013    COVID-19 Vaccine (3 - Booster for Pfizer series) 04/28/2021    Mammogram  10/31/2021    DEXA Scan  12/02/2022        No follow-ups on file.     Signature:  Nikki Bridges NP  [address]  [address]    Date of encounter: 12/7/22

## 2022-12-08 NOTE — ASSESSMENT & PLAN NOTE
Patient reports leg cramps have worsened at night over the last couple days. We will recheck CMP today.

## 2022-12-09 ENCOUNTER — CLINICAL SUPPORT (OUTPATIENT)
Dept: REHABILITATION | Facility: HOSPITAL | Age: 72
End: 2022-12-09
Payer: COMMERCIAL

## 2022-12-09 DIAGNOSIS — M72.2 BILATERAL PLANTAR FASCIITIS: Primary | ICD-10-CM

## 2022-12-09 DIAGNOSIS — M25.671 DECREASED RANGE OF MOTION OF BOTH ANKLES: ICD-10-CM

## 2022-12-09 DIAGNOSIS — M25.672 DECREASED RANGE OF MOTION OF BOTH ANKLES: ICD-10-CM

## 2022-12-09 PROCEDURE — 97110 THERAPEUTIC EXERCISES: CPT | Mod: PN,CQ

## 2022-12-09 NOTE — PROGRESS NOTES
Physical Therapy Treatment Note     Name: Delmis Shell  Clinic Number: [id number]    Therapy Diagnosis:   Encounter Diagnoses   Name Primary?    Bilateral plantar fasciitis Yes    Decreased range of motion of both ankles      Physician: Linda Jacobs FNP    Visit Date: 12/9/2022    Physician Orders: PT Eval and Treat    Medical Diagnosis from Referral: bilateral plantar fasciitis   Evaluation Date: 11/29/2022  Updated Plan of Care Due : 12/28/2022  Authorization Period Expiration: [employer]   Plan of Care Expiration: [facility] approved 8 visits plus eval through 1/31/2022  Visit # / Visits authorized: 4/ 9  PTA Visit #: 3    Time In: 1022  Time Out: 1100  Total Billable Time:   38 minutes    Precautions: Standard     Subjective     Pt reports: my right foot is stinging a lot, my left ankle hurt after I did bike and treadmill at home.  She was compliant with home exercise program.  Response to previous treatment:   Functional change: decreased pain with walking    Pain: 0/10   Location: bilateral ankles     Objective     Delmis received therapeutic exercises to develop strength, endurance, ROM, and flexibility for 38 minutes including:    Bike x 7'  Slant board x 2'  Double support squats total gym x 20  Double support calf raises total gym x 15  Bilateral ankle dorsiflexion,eversion,inversion all blue x 10  Bilateral alphabet 1 x 3#  Double support leg presses 20 x 55#  Bilateral ankle foot circles cw/ccw 15 x 3#    Range of motion  Dorsiflexion right  9        left   4  Inversion   right   16       left 13  Eversion      right  10       left 7    Delmis received the following manual therapy techniques: passive range of motion were applied to the: bilateral ankles for 6 minutes    Delmis received the following direct contact modalities after being cleared for contraindications: Ultrasound:  Delmis received ultrasound to manage pain and inflammation at 100 % duty cycle applied to the right  plantar  fascia  at an intensity of mhz for a duration of  minutes. Patient tolerated treatment well without adverse effects. Therapist was in attendance throughout intervention.    Home Exercises Provided and Patient Education Provided     Education provided: home exercise program     Written Home Exercises Provided: Patient instructed to cont prior HEP.  Exercises were reviewed and Delmis was able to demonstrate them prior to the end of the session.  Delmis demonstrated good  understanding of the education provided.     See EMR under Patient Instructions for exercises provided prior visit.    Assessment     Patient progressing with bilateral ankle strengthening.  Delmis Is progressing well towards her goals.   Pt prognosis is Excellent.     Pt will continue to benefit from skilled outpatient physical therapy to address the deficits listed in the problem list box on initial evaluation, provide pt/family education and to maximize pt's level of independence in the home and community environment.     Pt's spiritual, cultural and educational needs considered and pt agreeable to plan of care and goals.     Anticipated barriers to physical therapy: compliance with home exercise program     Goals:  Short Term Goals: 4 weeks   Pt will be independent with home ex program   Pt will increase bilateral dorsiflexion to 10 degrees   Pt will hamstring length to -30 degrees   Decrease pain when awaking to 6/10 to 4/10      Long Term Goals: 6 weeks   Pt will be able to walk in the morning with pain less than 2/10   Pt will be able to shop greater than 30 min or do yardwork pain free.     Plan     Plan of care Certification: 11/29/2022 to 12/28/2022.     Outpatient Physical Therapy 2 times weekly for 4 weeks to include the following interventions: Patient Education, Therapeutic Exercise, Ultrasound, and modalities as needed .   Ronda Rodriguez, PTA  12/9/2022

## 2022-12-12 ENCOUNTER — OFFICE VISIT (OUTPATIENT)
Dept: FAMILY MEDICINE | Facility: CLINIC | Age: 72
End: 2022-12-12
Payer: COMMERCIAL

## 2022-12-12 VITALS
BODY MASS INDEX: 30.49 KG/M2 | RESPIRATION RATE: 19 BRPM | HEIGHT: 65 IN | WEIGHT: 183 LBS | TEMPERATURE: 96 F | DIASTOLIC BLOOD PRESSURE: 68 MMHG | OXYGEN SATURATION: 98 % | HEART RATE: 85 BPM | SYSTOLIC BLOOD PRESSURE: 130 MMHG

## 2022-12-12 DIAGNOSIS — M79.605 PAIN IN BOTH LOWER EXTREMITIES: ICD-10-CM

## 2022-12-12 DIAGNOSIS — M79.604 PAIN IN BOTH LOWER EXTREMITIES: ICD-10-CM

## 2022-12-12 DIAGNOSIS — G47.62 SLEEP RELATED LEG CRAMPS: Primary | ICD-10-CM

## 2022-12-12 LAB
25(OH)D3 SERPL-MCNC: 80.7 NG/ML
ALBUMIN SERPL BCP-MCNC: 3.3 G/DL (ref 3.5–5)
ALBUMIN/GLOB SERPL: 0.9 {RATIO}
ALP SERPL-CCNC: 67 U/L (ref 55–142)
ALT SERPL W P-5'-P-CCNC: 30 U/L (ref 13–56)
ANION GAP SERPL CALCULATED.3IONS-SCNC: 13 MMOL/L (ref 7–16)
AST SERPL W P-5'-P-CCNC: 25 U/L (ref 15–37)
BILIRUB SERPL-MCNC: 0.1 MG/DL (ref ?–1.2)
BUN SERPL-MCNC: 19 MG/DL (ref 7–18)
BUN/CREAT SERPL: 18 (ref 6–20)
CALCIUM SERPL-MCNC: 9 MG/DL (ref 8.5–10.1)
CHLORIDE SERPL-SCNC: 106 MMOL/L (ref 98–107)
CO2 SERPL-SCNC: 27 MMOL/L (ref 21–32)
CREAT SERPL-MCNC: 1.03 MG/DL (ref 0.55–1.02)
EGFR (NO RACE VARIABLE) (RUSH/TITUS): 57 ML/MIN/1.73M²
GLOBULIN SER-MCNC: 3.7 G/DL (ref 2–4)
GLUCOSE SERPL-MCNC: 71 MG/DL (ref 74–106)
POTASSIUM SERPL-SCNC: 5.7 MMOL/L (ref 3.5–5.1)
PROT SERPL-MCNC: 7 G/DL (ref 6.4–8.2)
SODIUM SERPL-SCNC: 140 MMOL/L (ref 136–145)

## 2022-12-12 PROCEDURE — 82306 VITAMIN D 25 HYDROXY: CPT | Mod: ,,, | Performed by: CLINICAL MEDICAL LABORATORY

## 2022-12-12 PROCEDURE — 3075F SYST BP GE 130 - 139MM HG: CPT | Mod: ,,, | Performed by: NURSE PRACTITIONER

## 2022-12-12 PROCEDURE — 1159F PR MEDICATION LIST DOCUMENTED IN MEDICAL RECORD: ICD-10-PCS | Mod: ,,, | Performed by: NURSE PRACTITIONER

## 2022-12-12 PROCEDURE — 1160F PR REVIEW ALL MEDS BY PRESCRIBER/CLIN PHARMACIST DOCUMENTED: ICD-10-PCS | Mod: ,,, | Performed by: NURSE PRACTITIONER

## 2022-12-12 PROCEDURE — 3078F DIAST BP <80 MM HG: CPT | Mod: ,,, | Performed by: NURSE PRACTITIONER

## 2022-12-12 PROCEDURE — 99213 PR OFFICE/OUTPT VISIT, EST, LEVL III, 20-29 MIN: ICD-10-PCS | Mod: ,,, | Performed by: NURSE PRACTITIONER

## 2022-12-12 PROCEDURE — 3008F BODY MASS INDEX DOCD: CPT | Mod: ,,, | Performed by: NURSE PRACTITIONER

## 2022-12-12 PROCEDURE — 99213 OFFICE O/P EST LOW 20 MIN: CPT | Mod: ,,, | Performed by: NURSE PRACTITIONER

## 2022-12-12 PROCEDURE — 3008F PR BODY MASS INDEX (BMI) DOCUMENTED: ICD-10-PCS | Mod: ,,, | Performed by: NURSE PRACTITIONER

## 2022-12-12 PROCEDURE — 1125F PR PAIN SEVERITY QUANTIFIED, PAIN PRESENT: ICD-10-PCS | Mod: ,,, | Performed by: NURSE PRACTITIONER

## 2022-12-12 PROCEDURE — 1160F RVW MEDS BY RX/DR IN RCRD: CPT | Mod: ,,, | Performed by: NURSE PRACTITIONER

## 2022-12-12 PROCEDURE — 1159F MED LIST DOCD IN RCRD: CPT | Mod: ,,, | Performed by: NURSE PRACTITIONER

## 2022-12-12 PROCEDURE — 3075F PR MOST RECENT SYSTOLIC BLOOD PRESS GE 130-139MM HG: ICD-10-PCS | Mod: ,,, | Performed by: NURSE PRACTITIONER

## 2022-12-12 PROCEDURE — 85025 COMPLETE CBC W/AUTO DIFF WBC: CPT | Mod: ,,, | Performed by: CLINICAL MEDICAL LABORATORY

## 2022-12-12 PROCEDURE — 3078F PR MOST RECENT DIASTOLIC BLOOD PRESSURE < 80 MM HG: ICD-10-PCS | Mod: ,,, | Performed by: NURSE PRACTITIONER

## 2022-12-12 PROCEDURE — 82306 VITAMIN D: ICD-10-PCS | Mod: ,,, | Performed by: CLINICAL MEDICAL LABORATORY

## 2022-12-12 PROCEDURE — 85025 CBC WITH DIFFERENTIAL: ICD-10-PCS | Mod: ,,, | Performed by: CLINICAL MEDICAL LABORATORY

## 2022-12-12 PROCEDURE — 80053 COMPREHEN METABOLIC PANEL: CPT | Mod: ,,, | Performed by: CLINICAL MEDICAL LABORATORY

## 2022-12-12 PROCEDURE — 80053 COMPREHENSIVE METABOLIC PANEL: ICD-10-PCS | Mod: ,,, | Performed by: CLINICAL MEDICAL LABORATORY

## 2022-12-12 PROCEDURE — 1125F AMNT PAIN NOTED PAIN PRSNT: CPT | Mod: ,,, | Performed by: NURSE PRACTITIONER

## 2022-12-12 RX ORDER — BEPOTASTINE BESILATE 15 MG/ML
1 SOLUTION/ DROPS OPHTHALMIC 2 TIMES DAILY
COMMUNITY
Start: 2022-11-29 | End: 2023-11-30 | Stop reason: ALTCHOICE

## 2022-12-12 RX ORDER — TIZANIDINE 4 MG/1
4 TABLET ORAL EVERY 6 HOURS PRN
Qty: 30 TABLET | Refills: 3 | Status: SHIPPED | OUTPATIENT
Start: 2022-12-12 | End: 2022-12-22

## 2022-12-12 NOTE — PROGRESS NOTES
Nikki Bridges NP   [place] [address]  [place]  [address]      PATIENT NAME: Delmis Shell  : 1950  DATE: 22  MRN: [id number]      Billing Provider: Nikki Bridges NP  Level of Service: [place] OFFICE/OUTPT VISIT, EST, LEVL III, 20-29 MIN  Patient PCP Information       Provider PCP Type    [facility]            Reason for Visit / Chief Complaint: Spasms (Pt states she has been having muscle spasms in both of her legs x 3 days. States it is worse at night.)       Update PCP  Update Chief Complaint         History of Present Illness / Problem Focused Workflow     Delmis Shell presents to the clinic with Spasms (Pt states she has been having muscle spasms in both of her legs x 3 days. States it is worse at night.)     72 year old female presents to clinic with complaints of spasms/cramps to bilat lower extremities for 3-4 days. States it is worse at nigh. Patient is prescribed Potassium 10mg daily however she states after she started having these cramps she started taking it twice daily. Cautioned patient on taking extra medication as too much potassium can cause heart issues and other problems. Labs were ordered at her last visit but were not obtained. We will obtain these today and follow up with results. She states she has been on Flexeril at night for a while but is requesting to change up her muscle relaxer as it seems to not be working as well anymore.     Review of Systems     @Review of Systems   Constitutional:  Negative for activity change, appetite change, fatigue and fever.   HENT:  Negative for nasal congestion, ear pain, rhinorrhea, sinus pressure/congestion and sore throat.    Eyes:  Negative for pain, redness, visual disturbance and eye dryness.   Respiratory:  Negative for cough and shortness of breath.    Cardiovascular:  Negative for chest pain and leg swelling.   Gastrointestinal:  Negative for abdominal distention, abdominal  pain, constipation and diarrhea.   Endocrine: Negative for cold intolerance, heat intolerance and polyuria.   Genitourinary:  Negative for bladder incontinence, dysuria, frequency and urgency.   Musculoskeletal:  Positive for myalgias. Negative for arthralgias and gait problem.   Integumentary:  Negative for color change, rash and wound.   Allergic/Immunologic: Negative for environmental allergies and food allergies.   Neurological:  Negative for dizziness, weakness, light-headedness and headaches.   Psychiatric/Behavioral:  Negative for behavioral problems and sleep disturbance.      Medical / Social / Family History     Past Medical History:   Diagnosis Date    Arthritis     Hyperlipidemia     Hypertension     Left hip pain     Osteoporosis     Venous insufficiency, peripheral        Past Surgical History:   Procedure Laterality Date    EXCISION OF BREAST LESION Right 1970    KNEE ARTHROSCOPY Right 2020    low back disc surgery  2014    in [place]    TUBAL LIGATION      VAGINAL DELIVERY  1980    VAGINAL DELIVERY  1975    VENOUS ABLATION Right 09/18/2020    Anterior Shin Varithena Ablation performed by DR. Cesar Dumont.    VENOUS ABLATION Left 09/11/2020    Lower Anterior Shin Varithen Ablation performed by Dr. Cesar Dumont.       Social History  Ms.  reports that she has never smoked. She has never been exposed to tobacco smoke. She has never used smokeless tobacco. She reports that she does not currently use alcohol. She reports that she does not use drugs.    Family History  Ms.'s family history includes Breast cancer in her sister; Cancer in her maternal grandmother; Colon cancer in her brother and sister; Colon polyps in her sister; Diabetes in her father; Heart disease in her maternal grandfather; Kidney disease in her father; Stroke in her mother; Sudden death in her maternal grandfather.    Medications and Allergies     Medications  Outpatient Medications Marked as Taking for the 12/12/22 encounter (Office  Visit) with Nikki Bridges NP   Medication Sig Dispense Refill    aspirin (ECOTRIN) 81 MG EC tablet Take 81 mg by mouth once daily.      BEPREVE 1.5 % Drop Place 1 drop into both eyes 2 (two) times daily.      calcium carb and citrate-vitD3 600 mg calcium- 500 unit TbSR Take 1 tablet by mouth once daily.      cyclobenzaprine (FLEXERIL) 10 MG tablet Take 1 tablet (10 mg total) by mouth every evening. 90 tablet 1    denosumab (PROLIA) 60 mg/mL Syrg Inject 60 mg into the skin every 6 (six) months.      diclofenac sodium (VOLTAREN) 1 % Gel Apply 2 g topically once daily. 3 each 1    DUREZOL 0.05 % Drop ophthalmic solution       ergocalciferol (ERGOCALCIFEROL) 50,000 unit Cap Take 1 capsule (50,000 Units total) by mouth every 7 days. 12 capsule 1    fluticasone propionate (FLOVENT DISKUS) 50 mcg/actuation DsDv Inhale 1 spray into the lungs 2 (two) times a day. Controller 3 each 1    gabapentin (NEURONTIN) 100 MG capsule Take 100 mg by mouth 3 (three) times daily.      garlic 1,000 mg Cap Take 1 capsule by mouth once daily.      hydrOXYzine pamoate (VISTARIL) 25 MG Cap Take 1 capsule (25 mg total) by mouth 2 (two) times daily as needed (itching). 180 capsule 1    loratadine (CLARITIN) 10 mg tablet Take 1 tablet (10 mg total) by mouth once daily. 90 tablet 1    lovastatin (MEVACOR) 40 MG tablet TAKE 1 TABLET BY MOUTH EVERY EVENING 90 tablet 0    meloxicam (MOBIC) 15 MG tablet Take 1 tablet (15 mg total) by mouth once daily. (Patient taking differently: Take 15 mg by mouth daily as needed.) 90 tablet 1    montelukast (SINGULAIR) 10 mg tablet Take 1 tablet (10 mg total) by mouth every evening. 90 tablet 1    olmesartan-hydrochlorothiazide (BENICAR HCT) 20-12.5 mg per tablet Take 1 tablet by mouth once daily. 90 tablet 1    potassium chloride (KLOR-CON) 10 MEQ TbSR TAKE 1 TABLET BY MOUTH ONCE DAILY 90 tablet 0    traMADoL (ULTRAM) 50 mg tablet Take 1 tablet (50 mg total) by mouth every 12 (twelve) hours as needed for Pain.  12 tablet 0    triamcinolone acetonide 0.1% (KENALOG) 0.1 % cream Apply topically 2 (two) times daily. 28 g 2       Allergies  Review of patient's allergies indicates:  No Known Allergies    Physical Examination     Vitals:    12/12/22 1327   BP: 130/68   Pulse: 85   Resp: 19   Temp: 96.4 °F (35.8 °C)     Physical Exam  Vitals and nursing note reviewed.   HENT:      Head: Normocephalic.      Right Ear: Tympanic membrane normal.      Left Ear: Tympanic membrane normal.      Nose: Nose normal.      Mouth/Throat:      Mouth: Mucous membranes are moist.      Pharynx: Oropharynx is clear. No posterior oropharyngeal erythema.   Eyes:      Conjunctiva/sclera: Conjunctivae normal.   Cardiovascular:      Rate and Rhythm: Normal rate and regular rhythm.      Pulses: Normal pulses.      Heart sounds: Normal heart sounds.   Pulmonary:      Effort: Pulmonary effort is normal.      Breath sounds: Normal breath sounds.   Abdominal:      General: Abdomen is flat. Bowel sounds are normal. There is no distension.      Palpations: Abdomen is soft.   Musculoskeletal:         General: No swelling. Normal range of motion.      Cervical back: Normal range of motion.      Right lower leg: Tenderness present. No edema.      Left lower leg: Tenderness present. No edema.      Comments: Patient reported spasms/cramps to bilat lower ext- wrpse at night but legs continue to be sore during day.    Skin:     General: Skin is warm and dry.      Capillary Refill: Capillary refill takes less than 2 seconds.   Neurological:      Mental Status: She is alert. Mental status is at baseline.   Psychiatric:         Mood and Affect: Mood normal.         Behavior: Behavior normal.             Lab Results   Component Value Date    WBC 5.08 03/16/2022    HGB 12.1 03/16/2022    HCT 39.9 03/16/2022    MCV 91.7 03/16/2022     03/16/2022          Sodium   Date Value Ref Range Status   09/21/2021 139 136 - 145 mmol/L Final     Potassium   Date Value Ref Range  Status   09/21/2021 4.1 3.5 - 5.1 mmol/L Final     Chloride   Date Value Ref Range Status   09/21/2021 103 98 - 107 mmol/L Final     CO2   Date Value Ref Range Status   09/21/2021 28 21 - 32 mmol/L Final     Glucose   Date Value Ref Range Status   09/21/2021 86 74 - 106 mg/dL Final     BUN   Date Value Ref Range Status   09/21/2021 19 (H) 7 - 18 mg/dL Final     Creatinine   Date Value Ref Range Status   09/21/2021 0.98 0.55 - 1.02 mg/dL Final     Calcium   Date Value Ref Range Status   08/30/2022 9.5 8.5 - 10.1 mg/dL Final     Total Protein   Date Value Ref Range Status   09/21/2021 7.5 6.4 - 8.2 g/dL Final     Albumin   Date Value Ref Range Status   09/21/2021 3.4 (L) 3.5 - 5.0 g/dL Final     Bilirubin, Total   Date Value Ref Range Status   09/21/2021 0.3 >0.0 - 1.2 mg/dL Final     Alk Phos   Date Value Ref Range Status   09/21/2021 68 55 - 142 U/L Final     AST   Date Value Ref Range Status   09/21/2021 20 15 - 37 U/L Final     ALT   Date Value Ref Range Status   09/21/2021 27 13 - 56 U/L Final     Anion Gap   Date Value Ref Range Status   09/21/2021 12 7 - 16 mmol/L Final     eGFR    Date Value Ref Range Status   09/21/2021 72 >=60 mL/min/1.73m² Final     eGFR   Date Value Ref Range Status   10/21/2020 65        MRI Lumbar Spine Without Contrast  Narrative: EXAMINATION:  MRI LUMBAR SPINE WITHOUT CONTRAST    CLINICAL HISTORY:  .  Radiculopathy, lumbar region    TECHNIQUE:  The lumbar spine was imaged in the sagittal and axial planes on a 1.5 Kristin magnet without IV contrast.  Sequences include T1, T2, and STIR images.    COMPARISON:  No previous lumbar imaging exam available for comparison at this time    FINDINGS:  There is no compression fracture.  Pedicular screws and rods with associated metallic artifact fuse the pedicles bilaterally at L4-L5 with satisfactory alignment.  There has also been laminectomy at the same level.    There is some 3-4 mm anterolisthesis of L3 with respect to L4 with  mildly exaggerated lordosis at this level.  There is straightening of the spine otherwise.  There is mild scattered anterior spondylosis.  There is mild to moderate degenerative disc narrowing at L3-L4 with mild narrowing at L4-L5.  There is scattered mild disc desiccation.    Conus medullaris terminates at the lower L2 level and is without gross mass lesion.    There is no significant spinal or neural foraminal stenosis at T12-L1 through L2-L3.    There is moderate narrowing of the spinal canal at L3-L4 related to mild posterior diffuse disc bulging as well as moderate ligamentum flavum and facet hypertrophy.  There is also moderate neural foraminal stenosis on the left and prominent neural foraminal stenosis on the right secondary to posterolateral bulging disc and facet arthropathy at this level.    There is no significant spinal or neural foraminal stenosis at L4-L5.    There is no significant spinal stenosis at L5-S1.  There is mild neural foraminal narrowing bilaterally at L5-S1 secondary to posterolateral bulging disc and osteophyte as well as facet hypertrophy.  There is moderate ligamentum flavum and facet hypertrophy.  Impression: Moderate spinal stenosis and right greater than left neural foraminal stenosis at L3-L4 related to posterior bulging disc, ligamentum flavum and facet hypertrophy, and grade 1 anterolisthesis.    Postsurgical changes at L4-L5    Degenerative disc disease    Electronically signed by: Geovany Arreola  Date:    10/21/2021  Time:    10:39     Procedures   Assessment and Plan (including Health Maintenance)      Problem List  Smart Sets  Document Outside HM   :    Plan:           Problem List Items Addressed This Visit          Orthopedic    Pain in both lower extremities    Relevant Medications    tiZANidine (ZANAFLEX) 4 MG tablet       Other    Sleep related leg cramps - Primary    Current Assessment & Plan     CMP obtained today. Changed patient from Flexeril to Zanaflex to take  nightly for muscle spasms. Instructed medication may cause drowsiness/dizziness. Only take at bedtime and use caution.          Relevant Medications    tiZANidine (ZANAFLEX) 4 MG tablet       Health Maintenance Topics with due status: Not Due       Topic Last Completion Date    Lipid Panel 09/21/2021    Colorectal Cancer Screening 10/13/2021       Future Appointments   Date Time Provider Department Center   12/13/2022 11:00 AM [facility]   12/16/2022  9:30 AM [facility] OP RT [facility]   3/2/2023 10:00 AM [facility], [place] [facility]u DUNN        Health Maintenance Due   Topic Date Due    TETANUS VACCINE  Never done    Shingles Vaccine (2 of 3) 12/10/2013    COVID-19 Vaccine (3 - Booster for Pfizer series) 04/28/2021    Mammogram  10/31/2021    DEXA Scan  12/02/2022        No follow-ups on file.     Signature:  Nikki Bridges NP  [address]  [address]    Date of encounter: 12/12/22

## 2022-12-13 ENCOUNTER — CLINICAL SUPPORT (OUTPATIENT)
Dept: REHABILITATION | Facility: HOSPITAL | Age: 72
End: 2022-12-13
Payer: COMMERCIAL

## 2022-12-13 DIAGNOSIS — M72.2 BILATERAL PLANTAR FASCIITIS: Primary | ICD-10-CM

## 2022-12-13 DIAGNOSIS — M25.672 DECREASED RANGE OF MOTION OF BOTH ANKLES: ICD-10-CM

## 2022-12-13 DIAGNOSIS — E87.5 HYPERKALEMIA: Primary | ICD-10-CM

## 2022-12-13 DIAGNOSIS — M25.671 DECREASED RANGE OF MOTION OF BOTH ANKLES: ICD-10-CM

## 2022-12-13 LAB
BASOPHILS # BLD AUTO: 0.03 K/UL (ref 0–0.2)
BASOPHILS NFR BLD AUTO: 0.6 % (ref 0–1)
DIFFERENTIAL METHOD BLD: ABNORMAL
EOSINOPHIL # BLD AUTO: 0.11 K/UL (ref 0–0.5)
EOSINOPHIL NFR BLD AUTO: 2.2 % (ref 1–4)
ERYTHROCYTE [DISTWIDTH] IN BLOOD BY AUTOMATED COUNT: 14.5 % (ref 11.5–14.5)
HCT VFR BLD AUTO: 51.2 % (ref 38–47)
HGB BLD-MCNC: 13.4 G/DL (ref 12–16)
IMM GRANULOCYTES # BLD AUTO: 0 K/UL (ref 0–0.04)
IMM GRANULOCYTES NFR BLD: 0 % (ref 0–0.4)
LYMPHOCYTES # BLD AUTO: 1.56 K/UL (ref 1–4.8)
LYMPHOCYTES NFR BLD AUTO: 31.9 % (ref 27–41)
LYMPHOCYTES NFR BLD MANUAL: 33 % (ref 27–41)
MCH RBC QN AUTO: 27.3 PG (ref 27–31)
MCHC RBC AUTO-ENTMCNC: 26.2 G/DL (ref 32–36)
MCV RBC AUTO: 104.3 FL (ref 80–96)
MONOCYTES # BLD AUTO: 0.23 K/UL (ref 0–0.8)
MONOCYTES NFR BLD AUTO: 4.7 % (ref 2–6)
MPC BLD CALC-MCNC: 12.3 FL (ref 9.4–12.4)
NEUTROPHILS # BLD AUTO: 2.96 K/UL (ref 1.8–7.7)
NEUTROPHILS NFR BLD AUTO: 60.6 % (ref 53–65)
NEUTS BAND NFR BLD MANUAL: 4 % (ref 1–5)
NEUTS SEG NFR BLD MANUAL: 63 % (ref 50–62)
NRBC # BLD AUTO: 0 X10E3/UL
NRBC, AUTO (.00): 0 %
PLATELET # BLD AUTO: 212 K/UL (ref 150–400)
RBC # BLD AUTO: 4.91 M/UL (ref 4.2–5.4)
WBC # BLD AUTO: 4.89 K/UL (ref 4.5–11)

## 2022-12-13 PROCEDURE — 97110 THERAPEUTIC EXERCISES: CPT | Mod: PN,CQ

## 2022-12-13 NOTE — ASSESSMENT & PLAN NOTE
CMP obtained today. Changed patient from Flexeril to Zanaflex to take nightly for muscle spasms. Instructed medication may cause drowsiness/dizziness. Only take at bedtime and use caution.

## 2022-12-13 NOTE — PROGRESS NOTES
Physical Therapy Treatment Note     Name: Delmis Shell  Clinic Number: [id number]    Therapy Diagnosis:   Encounter Diagnoses   Name Primary?    Bilateral plantar fasciitis Yes    Decreased range of motion of both ankles      Physician: Linda Jacobs FNP    Visit Date: 12/13/2022    Physician Orders: PT Eval and Treat    Medical Diagnosis from Referral: bilateral plantar fasciitis   Evaluation Date: 11/29/2022  Updated Plan of Care Due : 12/28/2022  Authorization Period Expiration: [employer]   Plan of Care Expiration: [facility] approved 8 visits plus eval through 1/31/2022  Visit # / Visits authorized: 5/ 9  PTA Visit #: 4    Time In: 1110  Time Out: 1140  Total Billable Time:   30 minutes    Precautions: Standard     Subjective     Pt reports: I went to  and she put me on another med for leg cramps and it seemed to help some. My ankles are doing better  She was compliant with home exercise program.  Response to previous treatment:   Functional change: decreased pain with walking    Pain: 2/10   Location: bilateral ankles     Objective     Delmis received therapeutic exercises to develop strength, endurance, ROM, and flexibility for 38 minutes including:    Bike x 4'  Slant board x 2'  Calf raises off step x 15  Double support squats total gym x 20  Double support calf raises total gym x 15  Bilateral ankle dorsiflexion,eversion,inversion all blue x 10  Bilateral alphabet 1 x 3#  Double support leg presses 20 x 55#  Bilateral ankle foot circles cw/ccw 15 x 3#    Range of motion  Dorsiflexion right  10       left   5  Inversion   right   16       left 13  Eversion      right  10       left 8    Delmis received the following manual therapy techniques: passive range of motion were applied to the: bilateral ankles for 6 minutes    Delmis received the following direct contact modalities after being cleared for contraindications: Ultrasound:  Delmis received ultrasound to manage pain and inflammation at  100 % duty cycle applied to the right  plantar fascia  at an intensity of mhz for a duration of  minutes. Patient tolerated treatment well without adverse effects. Therapist was in attendance throughout intervention.    Home Exercises Provided and Patient Education Provided     Education provided: home exercise program     Written Home Exercises Provided: Patient instructed to cont prior HEP.  Exercises were reviewed and Delmis was able to demonstrate them prior to the end of the session.  Delmis demonstrated good  understanding of the education provided.     See EMR under Patient Instructions for exercises provided prior visit.    Assessment     Patient has difficulty following instructed while performing exercises requires frequent verbal cuing for proper technique  Delmis Is progressing well towards her goals.   Pt prognosis is Excellent.     Pt will continue to benefit from skilled outpatient physical therapy to address the deficits listed in the problem list box on initial evaluation, provide pt/family education and to maximize pt's level of independence in the home and community environment.     Pt's spiritual, cultural and educational needs considered and pt agreeable to plan of care and goals.     Anticipated barriers to physical therapy: compliance with home exercise program     Goals:  Short Term Goals: 4 weeks   Pt will be independent with home ex program   Pt will increase bilateral dorsiflexion to 10 degrees   Pt will hamstring length to -30 degrees   Decrease pain when awaking to 6/10 to 4/10      Long Term Goals: 6 weeks   Pt will be able to walk in the morning with pain less than 2/10   Pt will be able to shop greater than 30 min or do yardwork pain free.     Plan     Plan of care Certification: 11/29/2022 to 12/28/2022.     Outpatient Physical Therapy 2 times weekly for 4 weeks to include the following interventions: Patient Education, Therapeutic Exercise, Ultrasound, and modalities as needed .    Ronda Rodriguez, PTA  12/13/2022

## 2022-12-13 NOTE — PROGRESS NOTES
Labs reviewed: Potassium is elevated at 5.7. Stop taking potassium and avoid potassium rich foods. Increase water intake. Return to have labs rechecked in 1 week. I contacted patient and notified her of these findings. She verbalized understanding.

## 2023-01-24 NOTE — PLAN OF CARE
Physical Therapy Treatment Note      Name: Delmis Shell  Clinic Number: [id number]     Therapy Diagnosis:        Encounter Diagnoses   Name Primary?    Bilateral plantar fasciitis Yes    Decreased range of motion of both ankles        Physician: Linda Jacobs FNP     Visit Date: 12/13/2022     Physician Orders: PT Eval and Treat    Medical Diagnosis from Referral: bilateral plantar fasciitis   Evaluation Date: 11/29/2022  Updated Plan of Care Due : 12/28/2022  Authorization Period Expiration: [employer]   Plan of Care Expiration: [facility] approved 8 visits plus eval through 1/31/2022  Visit # / Visits authorized: 5/ 9  PTA Visit #: 4     Time In: 1110  Time Out: 1140  Total Billable Time:   30 minutes     Precautions: Standard     Subjective      Pt reports: I went to  and she put me on another med for leg cramps and it seemed to help some. My ankles are doing better  She was compliant with home exercise program.  Response to previous treatment:   Functional change: decreased pain with walking     Pain: 2/10   Location: bilateral ankles      Objective      Delmis received therapeutic exercises to develop strength, endurance, ROM, and flexibility for 38 minutes including:     Bike x 4'  Slant board x 2'  Calf raises off step x 15  Double support squats total gym x 20  Double support calf raises total gym x 15  Bilateral ankle dorsiflexion,eversion,inversion all blue x 10  Bilateral alphabet 1 x 3#  Double support leg presses 20 x 55#  Bilateral ankle foot circles cw/ccw 15 x 3#     Range of motion  Dorsiflexion right  10       left   5  Inversion   right   16       left 13  Eversion      right  10       left 8     Delmis received the following manual therapy techniques: passive range of motion were applied to the: bilateral ankles for 6 minutes     Delmis received the following direct contact modalities after being cleared for contraindications: Ultrasound:  Delmis received ultrasound to manage pain  and inflammation at 100 % duty cycle applied to the right  plantar fascia  at an intensity of mhz for a duration of  minutes. Patient tolerated treatment well without adverse effects. Therapist was in attendance throughout intervention.     Home Exercises Provided and Patient Education Provided      Education provided: home exercise program      Written Home Exercises Provided: Patient instructed to cont prior HEP.  Exercises were reviewed and Delmis was able to demonstrate them prior to the end of the session.  Delmis demonstrated good  understanding of the education provided.      See EMR under Patient Instructions for exercises provided prior visit.     Assessment      Patient has difficulty following instructed while performing exercises requires frequent verbal cuing for proper technique  Delmis Is progressing well towards her goals.   Pt prognosis is Excellent.      Pt will continue to benefit from skilled outpatient physical therapy to address the deficits listed in the problem list box on initial evaluation, provide pt/family education and to maximize pt's level of independence in the home and community environment.      Pt's spiritual, cultural and educational needs considered and pt agreeable to plan of care and goals.     Anticipated barriers to physical therapy: compliance with home exercise program      Goals:  Short Term Goals: 4 weeks   Pt will be independent with home ex program   Pt will increase bilateral dorsiflexion to 10 degrees   Pt will hamstring length to -30 degrees   Decrease pain when awaking to 6/10 to 4/10      Long Term Goals: 6 weeks   Pt will be able to walk in the morning with pain less than 2/10   Pt will be able to shop greater than 30 min or do yardwork pain free.      Plan      Outpatient Therapy Discharge Summary     Name: Delmis Shell  Clinic Number: [id number]    Therapy Diagnosis:   Encounter Diagnoses   Name Primary?    Bilateral plantar fasciitis Yes    Decreased  range of motion of both ankles      Physician: Linda Jacobs, RACHELEP     Assessment    Goals:  Pt met goals .     Discharge reason: Patient has met all of his/her goals    Plan   This patient is discharged from Physical Therapy.    Keshav Maldonado, PT

## 2023-01-25 ENCOUNTER — OFFICE VISIT (OUTPATIENT)
Dept: FAMILY MEDICINE | Facility: CLINIC | Age: 73
End: 2023-01-25
Payer: COMMERCIAL

## 2023-01-25 VITALS
HEART RATE: 71 BPM | TEMPERATURE: 98 F | OXYGEN SATURATION: 95 % | SYSTOLIC BLOOD PRESSURE: 128 MMHG | HEIGHT: 65 IN | RESPIRATION RATE: 18 BRPM | BODY MASS INDEX: 30.32 KG/M2 | DIASTOLIC BLOOD PRESSURE: 70 MMHG | WEIGHT: 182 LBS

## 2023-01-25 DIAGNOSIS — I10 HYPERTENSION, UNSPECIFIED TYPE: Primary | ICD-10-CM

## 2023-01-25 DIAGNOSIS — E87.5 HYPERKALEMIA: ICD-10-CM

## 2023-01-25 DIAGNOSIS — J30.9 ALLERGIC RHINITIS, UNSPECIFIED SEASONALITY, UNSPECIFIED TRIGGER: ICD-10-CM

## 2023-01-25 DIAGNOSIS — Z78.0 POSTMENOPAUSAL STATE: ICD-10-CM

## 2023-01-25 DIAGNOSIS — G47.62 SLEEP RELATED LEG CRAMPS: ICD-10-CM

## 2023-01-25 LAB
ALBUMIN SERPL BCP-MCNC: 3.4 G/DL (ref 3.5–5)
ALBUMIN/GLOB SERPL: 0.8 {RATIO}
ALP SERPL-CCNC: 61 U/L (ref 55–142)
ALT SERPL W P-5'-P-CCNC: 35 U/L (ref 13–56)
ANION GAP SERPL CALCULATED.3IONS-SCNC: 8 MMOL/L (ref 7–16)
AST SERPL W P-5'-P-CCNC: 23 U/L (ref 15–37)
BILIRUB SERPL-MCNC: 0.4 MG/DL (ref ?–1.2)
BUN SERPL-MCNC: 19 MG/DL (ref 7–18)
BUN/CREAT SERPL: 18 (ref 6–20)
CALCIUM SERPL-MCNC: 9.8 MG/DL (ref 8.5–10.1)
CHLORIDE SERPL-SCNC: 104 MMOL/L (ref 98–107)
CHOLEST SERPL-MCNC: 240 MG/DL (ref 0–200)
CHOLEST/HDLC SERPL: 2.8 {RATIO}
CO2 SERPL-SCNC: 31 MMOL/L (ref 21–32)
CREAT SERPL-MCNC: 1.04 MG/DL (ref 0.55–1.02)
EGFR (NO RACE VARIABLE) (RUSH/TITUS): 57 ML/MIN/1.73M²
GLOBULIN SER-MCNC: 4.1 G/DL (ref 2–4)
GLUCOSE SERPL-MCNC: 93 MG/DL (ref 74–106)
HDLC SERPL-MCNC: 85 MG/DL (ref 40–60)
LDLC SERPL CALC-MCNC: 130 MG/DL
LDLC/HDLC SERPL: 1.5 {RATIO}
MAGNESIUM SERPL-MCNC: 2.1 MG/DL (ref 1.7–2.3)
NONHDLC SERPL-MCNC: 155 MG/DL
POTASSIUM SERPL-SCNC: 4.3 MMOL/L (ref 3.5–5.1)
PROT SERPL-MCNC: 7.5 G/DL (ref 6.4–8.2)
SODIUM SERPL-SCNC: 139 MMOL/L (ref 136–145)
TRIGL SERPL-MCNC: 124 MG/DL (ref 35–150)
VLDLC SERPL-MCNC: 25 MG/DL

## 2023-01-25 PROCEDURE — 3008F PR BODY MASS INDEX (BMI) DOCUMENTED: ICD-10-PCS | Mod: ,,, | Performed by: NURSE PRACTITIONER

## 2023-01-25 PROCEDURE — 1101F PR PT FALLS ASSESS DOC 0-1 FALLS W/OUT INJ PAST YR: ICD-10-PCS | Mod: ,,, | Performed by: NURSE PRACTITIONER

## 2023-01-25 PROCEDURE — 3288F PR FALLS RISK ASSESSMENT DOCUMENTED: ICD-10-PCS | Mod: ,,, | Performed by: NURSE PRACTITIONER

## 2023-01-25 PROCEDURE — 80061 LIPID PANEL: ICD-10-PCS | Mod: ,,, | Performed by: CLINICAL MEDICAL LABORATORY

## 2023-01-25 PROCEDURE — 80053 COMPREHENSIVE METABOLIC PANEL: ICD-10-PCS | Mod: ,,, | Performed by: CLINICAL MEDICAL LABORATORY

## 2023-01-25 PROCEDURE — 99214 PR OFFICE/OUTPT VISIT, EST, LEVL IV, 30-39 MIN: ICD-10-PCS | Mod: ,,, | Performed by: NURSE PRACTITIONER

## 2023-01-25 PROCEDURE — 3074F PR MOST RECENT SYSTOLIC BLOOD PRESSURE < 130 MM HG: ICD-10-PCS | Mod: ,,, | Performed by: NURSE PRACTITIONER

## 2023-01-25 PROCEDURE — 3074F SYST BP LT 130 MM HG: CPT | Mod: ,,, | Performed by: NURSE PRACTITIONER

## 2023-01-25 PROCEDURE — 3078F PR MOST RECENT DIASTOLIC BLOOD PRESSURE < 80 MM HG: ICD-10-PCS | Mod: ,,, | Performed by: NURSE PRACTITIONER

## 2023-01-25 PROCEDURE — 3288F FALL RISK ASSESSMENT DOCD: CPT | Mod: ,,, | Performed by: NURSE PRACTITIONER

## 2023-01-25 PROCEDURE — 1160F RVW MEDS BY RX/DR IN RCRD: CPT | Mod: ,,, | Performed by: NURSE PRACTITIONER

## 2023-01-25 PROCEDURE — 3078F DIAST BP <80 MM HG: CPT | Mod: ,,, | Performed by: NURSE PRACTITIONER

## 2023-01-25 PROCEDURE — 80061 LIPID PANEL: CPT | Mod: ,,, | Performed by: CLINICAL MEDICAL LABORATORY

## 2023-01-25 PROCEDURE — 83735 ASSAY OF MAGNESIUM: CPT | Mod: ,,, | Performed by: CLINICAL MEDICAL LABORATORY

## 2023-01-25 PROCEDURE — 80053 COMPREHEN METABOLIC PANEL: CPT | Mod: ,,, | Performed by: CLINICAL MEDICAL LABORATORY

## 2023-01-25 PROCEDURE — 83735 MAGNESIUM: ICD-10-PCS | Mod: ,,, | Performed by: CLINICAL MEDICAL LABORATORY

## 2023-01-25 PROCEDURE — 1160F PR REVIEW ALL MEDS BY PRESCRIBER/CLIN PHARMACIST DOCUMENTED: ICD-10-PCS | Mod: ,,, | Performed by: NURSE PRACTITIONER

## 2023-01-25 PROCEDURE — 3008F BODY MASS INDEX DOCD: CPT | Mod: ,,, | Performed by: NURSE PRACTITIONER

## 2023-01-25 PROCEDURE — 1159F PR MEDICATION LIST DOCUMENTED IN MEDICAL RECORD: ICD-10-PCS | Mod: ,,, | Performed by: NURSE PRACTITIONER

## 2023-01-25 PROCEDURE — 99214 OFFICE O/P EST MOD 30 MIN: CPT | Mod: ,,, | Performed by: NURSE PRACTITIONER

## 2023-01-25 PROCEDURE — 1159F MED LIST DOCD IN RCRD: CPT | Mod: ,,, | Performed by: NURSE PRACTITIONER

## 2023-01-25 PROCEDURE — 1101F PT FALLS ASSESS-DOCD LE1/YR: CPT | Mod: ,,, | Performed by: NURSE PRACTITIONER

## 2023-01-25 RX ORDER — OLMESARTAN MEDOXOMIL AND HYDROCHLOROTHIAZIDE 20/12.5 20; 12.5 MG/1; MG/1
1 TABLET ORAL DAILY
Qty: 90 TABLET | Refills: 1 | Status: SHIPPED | OUTPATIENT
Start: 2023-01-25 | End: 2023-04-19

## 2023-01-25 RX ORDER — LORATADINE 10 MG/1
10 TABLET ORAL DAILY
Qty: 90 TABLET | Refills: 1 | Status: SHIPPED | OUTPATIENT
Start: 2023-01-25 | End: 2023-10-13 | Stop reason: SDUPTHER

## 2023-01-25 NOTE — PROGRESS NOTES
Nikki Bridges NP   [facility]  [address]  [place]  [address]      PATIENT NAME: Delmis Shell  : 1950  DATE: 23  MRN: [id number]      Billing Provider: Nikki Bridges NP  Level of Service: [place] OFFICE/OUTPT VISIT, EST, LEVL IV, 30-39 MIN  Patient PCP Information       Provider PCP Type    Nikki Bridges NP General            Reason for Visit / Chief Complaint: Hyperlipidemia (Here for check up and med refills.) and Hypertension (Here for check up and med refills.)       Update PCP  Update Chief Complaint         History of Present Illness / Problem Focused Workflow     Delmis Shell presents to the clinic with Hyperlipidemia (Here for check up and med refills.) and Hypertension (Here for check up and med refills.)     72 year old female presents to clinic for check up and med refill. She states she needs refill on Claritin and on her Benicar. She also reports she has not been taking Lovastatin for about 3 weeks ago due to it was causing her muscle aches. She denies other problems at today's visit.     Review of Systems     @Review of Systems   Constitutional:  Negative for activity change, appetite change, fatigue and fever.   HENT:  Negative for nasal congestion, ear pain, rhinorrhea, sinus pressure/congestion and sore throat.    Eyes:  Negative for pain, redness, visual disturbance and eye dryness.   Respiratory:  Negative for cough and shortness of breath.    Cardiovascular:  Negative for chest pain and leg swelling.   Gastrointestinal:  Negative for abdominal distention, abdominal pain, constipation and diarrhea.   Endocrine: Negative for cold intolerance, heat intolerance and polyuria.   Genitourinary:  Negative for bladder incontinence, dysuria, frequency and urgency.   Musculoskeletal:  Positive for myalgias. Negative for arthralgias and gait problem.   Integumentary:  Negative for color change, rash and wound.   Allergic/Immunologic: Negative for  environmental allergies and food allergies.   Neurological:  Negative for dizziness, weakness, light-headedness and headaches.   Psychiatric/Behavioral:  Negative for behavioral problems and sleep disturbance.      Medical / Social / Family History     Past Medical History:   Diagnosis Date    Arthritis     Hyperlipidemia     Hypertension     Left hip pain     Osteoporosis     Venous insufficiency, peripheral        Past Surgical History:   Procedure Laterality Date    EXCISION OF BREAST LESION Right 1970    KNEE ARTHROSCOPY Right 2020    low back disc surgery  2014    in [place]    TUBAL LIGATION      VAGINAL DELIVERY  1980    VAGINAL DELIVERY  1975    VENOUS ABLATION Right 09/18/2020    Anterior Shin Varithena Ablation performed by DR. Cesar Dumont.    VENOUS ABLATION Left 09/11/2020    Lower Anterior Shin Varithen Ablation performed by Dr. Cesar Dumont.       Social History  Ms.  reports that she has never smoked. She has never been exposed to tobacco smoke. She has never used smokeless tobacco. She reports that she does not currently use alcohol. She reports that she does not use drugs.    Family History  Ms.'s family history includes Breast cancer in her sister; Cancer in her maternal grandmother; Colon cancer in her brother and sister; Colon polyps in her sister; Diabetes in her father; Heart disease in her maternal grandfather; Kidney disease in her father; Stroke in her mother; Sudden death in her maternal grandfather.    Medications and Allergies     Medications  Outpatient Medications Marked as Taking for the 1/25/23 encounter (Office Visit) with Nikki Bridges NP   Medication Sig Dispense Refill    aspirin (ECOTRIN) 81 MG EC tablet Take 81 mg by mouth once daily.      BEPREVE 1.5 % Drop Place 1 drop into both eyes 2 (two) times daily.      calcium carb and citrate-vitD3 600 mg calcium- 500 unit TbSR Take 1 tablet by mouth once daily.      cyclobenzaprine (FLEXERIL) 10 MG tablet Take 1 tablet (10 mg  total) by mouth every evening. 90 tablet 1    denosumab (PROLIA) 60 mg/mL Syrg Inject 60 mg into the skin every 6 (six) months.      diclofenac sodium (VOLTAREN) 1 % Gel Apply 2 g topically once daily. 3 each 1    DUREZOL 0.05 % Drop ophthalmic solution       ergocalciferol (ERGOCALCIFEROL) 50,000 unit Cap Take 1 capsule (50,000 Units total) by mouth every 7 days. 12 capsule 1    fluticasone propionate (FLOVENT DISKUS) 50 mcg/actuation DsDv Inhale 1 spray into the lungs 2 (two) times a day. Controller 3 each 1    gabapentin (NEURONTIN) 100 MG capsule Take 100 mg by mouth 3 (three) times daily.      garlic 1,000 mg Cap Take 1 capsule by mouth once daily.      hydrOXYzine pamoate (VISTARIL) 25 MG Cap Take 1 capsule (25 mg total) by mouth 2 (two) times daily as needed (itching). 180 capsule 1    lovastatin (MEVACOR) 40 MG tablet TAKE 1 TABLET BY MOUTH EVERY EVENING 90 tablet 0    meloxicam (MOBIC) 15 MG tablet Take 1 tablet (15 mg total) by mouth once daily. (Patient taking differently: Take 15 mg by mouth daily as needed.) 90 tablet 1    traMADoL (ULTRAM) 50 mg tablet Take 1 tablet (50 mg total) by mouth every 12 (twelve) hours as needed for Pain. 12 tablet 0    triamcinolone acetonide 0.1% (KENALOG) 0.1 % cream Apply topically 2 (two) times daily. 28 g 2    [DISCONTINUED] loratadine (CLARITIN) 10 mg tablet Take 1 tablet (10 mg total) by mouth once daily. 90 tablet 1    [DISCONTINUED] olmesartan-hydrochlorothiazide (BENICAR HCT) 20-12.5 mg per tablet Take 1 tablet by mouth once daily. 90 tablet 1       Allergies  Review of patient's allergies indicates:  No Known Allergies    Physical Examination     Vitals:    01/25/23 0948   BP: 128/70   Pulse: 71   Resp: 18   Temp: 98.1 °F (36.7 °C)     Physical Exam  Vitals and nursing note reviewed.   HENT:      Head: Normocephalic.      Right Ear: Tympanic membrane normal.      Left Ear: Tympanic membrane normal.      Nose: Nose normal.      Mouth/Throat:      Mouth: Mucous  membranes are moist.      Pharynx: Oropharynx is clear. No posterior oropharyngeal erythema.   Eyes:      Conjunctiva/sclera: Conjunctivae normal.   Cardiovascular:      Rate and Rhythm: Normal rate and regular rhythm.      Pulses: Normal pulses.      Heart sounds: Normal heart sounds.   Pulmonary:      Effort: Pulmonary effort is normal.      Breath sounds: Normal breath sounds.   Abdominal:      General: Abdomen is flat. Bowel sounds are normal. There is no distension.      Palpations: Abdomen is soft.   Musculoskeletal:         General: No swelling or tenderness. Normal range of motion.      Cervical back: Normal range of motion.      Right lower leg: No edema.      Left lower leg: No edema.   Skin:     General: Skin is warm and dry.      Capillary Refill: Capillary refill takes less than 2 seconds.   Neurological:      Mental Status: She is alert. Mental status is at baseline.   Psychiatric:         Mood and Affect: Mood normal.         Behavior: Behavior normal.             Lab Results   Component Value Date    WBC 4.89 12/12/2022    HGB 13.4 12/12/2022    HCT 51.2 (H) 12/12/2022    .3 (H) 12/12/2022     12/12/2022          Sodium   Date Value Ref Range Status   12/12/2022 140 136 - 145 mmol/L Final     Potassium   Date Value Ref Range Status   12/12/2022 5.7 (H) 3.5 - 5.1 mmol/L Final     Chloride   Date Value Ref Range Status   12/12/2022 106 98 - 107 mmol/L Final     CO2   Date Value Ref Range Status   12/12/2022 27 21 - 32 mmol/L Final     Glucose   Date Value Ref Range Status   12/12/2022 71 (L) 74 - 106 mg/dL Final     BUN   Date Value Ref Range Status   12/12/2022 19 (H) 7 - 18 mg/dL Final     Creatinine   Date Value Ref Range Status   12/12/2022 1.03 (H) 0.55 - 1.02 mg/dL Final     Calcium   Date Value Ref Range Status   12/12/2022 9.0 8.5 - 10.1 mg/dL Final     Total Protein   Date Value Ref Range Status   12/12/2022 7.0 6.4 - 8.2 g/dL Final     Albumin   Date Value Ref Range Status    12/12/2022 3.3 (L) 3.5 - 5.0 g/dL Final     Bilirubin, Total   Date Value Ref Range Status   12/12/2022 0.1 >0.0 - 1.2 mg/dL Final     Alk Phos   Date Value Ref Range Status   12/12/2022 67 55 - 142 U/L Final     AST   Date Value Ref Range Status   12/12/2022 25 15 - 37 U/L Final     ALT   Date Value Ref Range Status   12/12/2022 30 13 - 56 U/L Final     Anion Gap   Date Value Ref Range Status   12/12/2022 13 7 - 16 mmol/L Final     eGFR    Date Value Ref Range Status   09/21/2021 72 >=60 mL/min/1.73m² Final     eGFR   Date Value Ref Range Status   10/21/2020 65        MRI Lumbar Spine Without Contrast  Narrative: EXAMINATION:  MRI LUMBAR SPINE WITHOUT CONTRAST    CLINICAL HISTORY:  .  Radiculopathy, lumbar region    TECHNIQUE:  The lumbar spine was imaged in the sagittal and axial planes on a 1.5 Kristin magnet without IV contrast.  Sequences include T1, T2, and STIR images.    COMPARISON:  No previous lumbar imaging exam available for comparison at this time    FINDINGS:  There is no compression fracture.  Pedicular screws and rods with associated metallic artifact fuse the pedicles bilaterally at L4-L5 with satisfactory alignment.  There has also been laminectomy at the same level.    There is some 3-4 mm anterolisthesis of L3 with respect to L4 with mildly exaggerated lordosis at this level.  There is straightening of the spine otherwise.  There is mild scattered anterior spondylosis.  There is mild to moderate degenerative disc narrowing at L3-L4 with mild narrowing at L4-L5.  There is scattered mild disc desiccation.    Conus medullaris terminates at the lower L2 level and is without gross mass lesion.    There is no significant spinal or neural foraminal stenosis at T12-L1 through L2-L3.    There is moderate narrowing of the spinal canal at L3-L4 related to mild posterior diffuse disc bulging as well as moderate ligamentum flavum and facet hypertrophy.  There is also moderate neural foraminal  stenosis on the left and prominent neural foraminal stenosis on the right secondary to posterolateral bulging disc and facet arthropathy at this level.    There is no significant spinal or neural foraminal stenosis at L4-L5.    There is no significant spinal stenosis at L5-S1.  There is mild neural foraminal narrowing bilaterally at L5-S1 secondary to posterolateral bulging disc and osteophyte as well as facet hypertrophy.  There is moderate ligamentum flavum and facet hypertrophy.  Impression: Moderate spinal stenosis and right greater than left neural foraminal stenosis at L3-L4 related to posterior bulging disc, ligamentum flavum and facet hypertrophy, and grade 1 anterolisthesis.    Postsurgical changes at L4-L5    Degenerative disc disease    Electronically signed by: Geovany Arreola  Date:    10/21/2021  Time:    10:39     Procedures   Assessment and Plan (including Health Maintenance)      Problem List  Smart Sets  Document Outside HM   :    Plan:           Problem List Items Addressed This Visit          Cardiac/Vascular    Hypertension - Primary    Current Assessment & Plan     Blood pressure well controlled. Continue current medications. Follow up in 3 months or as needed.            Relevant Medications    olmesartan-hydrochlorothiazide (BENICAR HCT) 20-12.5 mg per tablet    Other Relevant Orders    Comprehensive Metabolic Panel    Lipid Panel    Magnesium       Renal/    Hyperkalemia    Current Assessment & Plan     CMP obtained today. Will follow up with results.             Other    Sleep related leg cramps    Current Assessment & Plan     CMP and Mag obtained today.           Other Visit Diagnoses       Allergic rhinitis, unspecified seasonality, unspecified trigger        Relevant Medications    loratadine (CLARITIN) 10 mg tablet    Postmenopausal state        Relevant Orders    DXA Bone Density Spine And Hip            Health Maintenance Topics with due status: Not Due       Topic Last Completion  Date    Lipid Panel 09/21/2021    Colorectal Cancer Screening 10/13/2021       Future Appointments   Date Time Provider Department Center   3/2/2023 10:00 AM [facility], [facility]        Health Maintenance Due   Topic Date Due    TETANUS VACCINE  Never done    Shingles Vaccine (2 of 3) 12/10/2013    COVID-19 Vaccine (3 - Booster for Pfizer series) 04/28/2021    Mammogram  10/31/2021    DEXA Scan  12/02/2022        No follow-ups on file.     Signature:  Nikki Bridges NP  [facility]  [address] [place] [address], [place]  [address]    Date of encounter: 1/25/23

## 2023-01-26 NOTE — PROGRESS NOTES
Labs reviewed: Kidney function tests were a little elevated, increase fluids. Patient was interested in potassium and magnesium was due to leg cramps. Please notify her that both levels were normal. Potassium was 4.3 (it was 5.7 a month ago). Her lipid panel (cholesterol) was elevated and I really feel that she should take the statin for reduction of risk of CV events. If she is agreeable we can try a different statin since the lovastatin contributed to her leg pain.

## 2023-01-31 ENCOUNTER — IMMUNIZATION (OUTPATIENT)
Dept: FAMILY MEDICINE | Facility: CLINIC | Age: 73
End: 2023-01-31
Payer: COMMERCIAL

## 2023-01-31 DIAGNOSIS — Z23 NEED FOR VACCINATION: Primary | ICD-10-CM

## 2023-01-31 PROCEDURE — 0134A COVID-19, MRNA, LNP-S, BIVALENT BOOSTER, PF, 50 MCG/0.5 ML: CPT | Mod: ,,, | Performed by: NURSE PRACTITIONER

## 2023-01-31 PROCEDURE — 91313 COVID-19, MRNA, LNP-S, BIVALENT BOOSTER, PF, 50 MCG/0.5 ML: ICD-10-PCS | Mod: ,,, | Performed by: NURSE PRACTITIONER

## 2023-01-31 PROCEDURE — 0134A COVID-19, MRNA, LNP-S, BIVALENT BOOSTER, PF, 50 MCG/0.5 ML: ICD-10-PCS | Mod: ,,, | Performed by: NURSE PRACTITIONER

## 2023-01-31 PROCEDURE — 91313 COVID-19, MRNA, LNP-S, BIVALENT BOOSTER, PF, 50 MCG/0.5 ML: CPT | Mod: ,,, | Performed by: NURSE PRACTITIONER

## 2023-02-02 ENCOUNTER — HOSPITAL ENCOUNTER (OUTPATIENT)
Dept: RADIOLOGY | Facility: HOSPITAL | Age: 73
Discharge: HOME | End: 2023-02-02
Attending: NURSE PRACTITIONER
Payer: COMMERCIAL

## 2023-02-02 DIAGNOSIS — Z78.0 POSTMENOPAUSAL STATE: ICD-10-CM

## 2023-02-02 PROCEDURE — 77080 DXA BONE DENSITY AXIAL: CPT | Mod: TC

## 2023-02-02 NOTE — PROGRESS NOTES
DEXA scan reviewed. It showed some osteopenia or bone loss. Patient should continue her calcium/vitamin D supplement as ordered. Recommend repeat DEXA scan in 2 years.

## 2023-02-03 RX ORDER — ROSUVASTATIN CALCIUM 10 MG/1
10 TABLET, COATED ORAL DAILY
Qty: 90 TABLET | Refills: 3 | Status: SHIPPED | OUTPATIENT
Start: 2023-02-03 | End: 2023-08-11 | Stop reason: SDUPTHER

## 2023-03-02 ENCOUNTER — INFUSION (OUTPATIENT)
Dept: INFUSION THERAPY | Facility: HOSPITAL | Age: 73
End: 2023-03-02
Attending: NURSE PRACTITIONER
Payer: COMMERCIAL

## 2023-03-02 VITALS
BODY MASS INDEX: 29.89 KG/M2 | WEIGHT: 179.38 LBS | HEIGHT: 65 IN | SYSTOLIC BLOOD PRESSURE: 156 MMHG | DIASTOLIC BLOOD PRESSURE: 77 MMHG | HEART RATE: 77 BPM | TEMPERATURE: 98 F | RESPIRATION RATE: 18 BRPM

## 2023-03-02 DIAGNOSIS — M81.0 OSTEOPOROSIS, UNSPECIFIED OSTEOPOROSIS TYPE, UNSPECIFIED PATHOLOGICAL FRACTURE PRESENCE: Primary | ICD-10-CM

## 2023-03-02 PROCEDURE — 96372 THER/PROPH/DIAG INJ SC/IM: CPT

## 2023-03-02 PROCEDURE — 63600175 PHARM REV CODE 636 W HCPCS: Mod: JZ,JG | Performed by: NURSE PRACTITIONER

## 2023-03-02 RX ADMIN — DENOSUMAB 60 MG: 60 INJECTION SUBCUTANEOUS at 10:03

## 2023-03-02 NOTE — PROGRESS NOTES
1000 Patient in room 2. Calcium level drawn 1 month ago and was 9.8      1014 Administered Prolia 60mg SQ to right upper arm per protocol.  Patient tolerated well.      1034 Patient had no adverse reactions noted.  Patient discharged to home ambulatory with appt to return in 6 months for her next prolia injection.

## 2023-03-07 ENCOUNTER — OFFICE VISIT (OUTPATIENT)
Dept: FAMILY MEDICINE | Facility: CLINIC | Age: 73
End: 2023-03-07
Payer: COMMERCIAL

## 2023-03-07 VITALS
BODY MASS INDEX: 29.16 KG/M2 | HEART RATE: 72 BPM | TEMPERATURE: 98 F | OXYGEN SATURATION: 98 % | HEIGHT: 65 IN | WEIGHT: 175 LBS | RESPIRATION RATE: 19 BRPM | DIASTOLIC BLOOD PRESSURE: 82 MMHG | SYSTOLIC BLOOD PRESSURE: 140 MMHG

## 2023-03-07 DIAGNOSIS — M72.2 PLANTAR FASCIITIS, LEFT: Primary | ICD-10-CM

## 2023-03-07 DIAGNOSIS — M25.552 LEFT HIP PAIN: ICD-10-CM

## 2023-03-07 DIAGNOSIS — E55.9 VITAMIN D DEFICIENCY: ICD-10-CM

## 2023-03-07 DIAGNOSIS — H91.93 DECREASED HEARING OF BOTH EARS: ICD-10-CM

## 2023-03-07 DIAGNOSIS — H61.22 IMPACTED CERUMEN OF LEFT EAR: ICD-10-CM

## 2023-03-07 PROCEDURE — 1160F PR REVIEW ALL MEDS BY PRESCRIBER/CLIN PHARMACIST DOCUMENTED: ICD-10-PCS | Mod: ,,, | Performed by: NURSE PRACTITIONER

## 2023-03-07 PROCEDURE — 1125F AMNT PAIN NOTED PAIN PRSNT: CPT | Mod: ,,, | Performed by: NURSE PRACTITIONER

## 2023-03-07 PROCEDURE — 1125F PR PAIN SEVERITY QUANTIFIED, PAIN PRESENT: ICD-10-PCS | Mod: ,,, | Performed by: NURSE PRACTITIONER

## 2023-03-07 PROCEDURE — 96372 PR INJECTION,THERAP/PROPH/DIAG2ST, IM OR SUBCUT: ICD-10-PCS | Mod: 59,,, | Performed by: NURSE PRACTITIONER

## 2023-03-07 PROCEDURE — 1159F PR MEDICATION LIST DOCUMENTED IN MEDICAL RECORD: ICD-10-PCS | Mod: ,,, | Performed by: NURSE PRACTITIONER

## 2023-03-07 PROCEDURE — 99213 PR OFFICE/OUTPT VISIT, EST, LEVL III, 20-29 MIN: ICD-10-PCS | Mod: 25,,, | Performed by: NURSE PRACTITIONER

## 2023-03-07 PROCEDURE — 1159F MED LIST DOCD IN RCRD: CPT | Mod: ,,, | Performed by: NURSE PRACTITIONER

## 2023-03-07 PROCEDURE — 1101F PT FALLS ASSESS-DOCD LE1/YR: CPT | Mod: ,,, | Performed by: NURSE PRACTITIONER

## 2023-03-07 PROCEDURE — 3079F DIAST BP 80-89 MM HG: CPT | Mod: ,,, | Performed by: NURSE PRACTITIONER

## 2023-03-07 PROCEDURE — 3079F PR MOST RECENT DIASTOLIC BLOOD PRESSURE 80-89 MM HG: ICD-10-PCS | Mod: ,,, | Performed by: NURSE PRACTITIONER

## 2023-03-07 PROCEDURE — 3077F SYST BP >= 140 MM HG: CPT | Mod: ,,, | Performed by: NURSE PRACTITIONER

## 2023-03-07 PROCEDURE — 3288F PR FALLS RISK ASSESSMENT DOCUMENTED: ICD-10-PCS | Mod: ,,, | Performed by: NURSE PRACTITIONER

## 2023-03-07 PROCEDURE — 69209 REMOVE IMPACTED EAR WAX UNI: CPT | Mod: LT,,, | Performed by: NURSE PRACTITIONER

## 2023-03-07 PROCEDURE — 1160F RVW MEDS BY RX/DR IN RCRD: CPT | Mod: ,,, | Performed by: NURSE PRACTITIONER

## 2023-03-07 PROCEDURE — 69209 EAR CERUMEN REMOVAL: ICD-10-PCS | Mod: LT,,, | Performed by: NURSE PRACTITIONER

## 2023-03-07 PROCEDURE — 99213 OFFICE O/P EST LOW 20 MIN: CPT | Mod: 25,,, | Performed by: NURSE PRACTITIONER

## 2023-03-07 PROCEDURE — 3077F PR MOST RECENT SYSTOLIC BLOOD PRESSURE >= 140 MM HG: ICD-10-PCS | Mod: ,,, | Performed by: NURSE PRACTITIONER

## 2023-03-07 PROCEDURE — 96372 THER/PROPH/DIAG INJ SC/IM: CPT | Mod: 59,,, | Performed by: NURSE PRACTITIONER

## 2023-03-07 PROCEDURE — 1101F PR PT FALLS ASSESS DOC 0-1 FALLS W/OUT INJ PAST YR: ICD-10-PCS | Mod: ,,, | Performed by: NURSE PRACTITIONER

## 2023-03-07 PROCEDURE — 3288F FALL RISK ASSESSMENT DOCD: CPT | Mod: ,,, | Performed by: NURSE PRACTITIONER

## 2023-03-07 RX ORDER — METHYLPREDNISOLONE 4 MG/1
TABLET ORAL
Qty: 21 EACH | Refills: 0 | Status: SHIPPED | OUTPATIENT
Start: 2023-03-07 | End: 2023-03-28

## 2023-03-07 RX ORDER — HYDROXYZINE PAMOATE 25 MG/1
25 CAPSULE ORAL 2 TIMES DAILY PRN
Qty: 180 CAPSULE | Refills: 1 | Status: SHIPPED | OUTPATIENT
Start: 2023-03-07 | End: 2023-05-31

## 2023-03-07 RX ORDER — DICLOFENAC SODIUM 10 MG/G
2 GEL TOPICAL DAILY
Qty: 3 EACH | Refills: 1 | Status: SHIPPED | OUTPATIENT
Start: 2023-03-07

## 2023-03-07 RX ORDER — TIZANIDINE 4 MG/1
TABLET ORAL
COMMUNITY
Start: 2023-02-07 | End: 2023-04-11

## 2023-03-07 RX ORDER — ERGOCALCIFEROL 1.25 MG/1
50000 CAPSULE ORAL
Qty: 12 CAPSULE | Refills: 1 | Status: SHIPPED | OUTPATIENT
Start: 2023-03-07 | End: 2023-07-17

## 2023-03-07 RX ORDER — KETOROLAC TROMETHAMINE 30 MG/ML
30 INJECTION, SOLUTION INTRAMUSCULAR; INTRAVENOUS
Status: COMPLETED | OUTPATIENT
Start: 2023-03-07 | End: 2023-03-07

## 2023-03-07 RX ADMIN — KETOROLAC TROMETHAMINE 30 MG: 30 INJECTION, SOLUTION INTRAMUSCULAR; INTRAVENOUS at 03:03

## 2023-03-07 NOTE — PROGRESS NOTES
Nikki Bridges NP   [facility]  [address]  [place]  [address]      PATIENT NAME: Delmis Shell  : 1950  DATE: 3/7/23  MRN: [id number]      Billing Provider: Nikki Bridges NP  Level of Service: [place] OFFICE/OUTPT VISIT, EST, LEVL III, 20-29 MIN  Patient PCP Information       Provider PCP Type    Nikki Bridges NP General            Reason for Visit / Chief Complaint: Foot Pain (X3 weeks. Soreness and stinging sensation on sole of left foot)       Update PCP  Update Chief Complaint         History of Present Illness / Problem Focused Workflow     Delmis Shell presents to the clinic with Foot Pain (X3 weeks. Soreness and stinging sensation on sole of left foot)     73 year old female presents to clinic with complaints of pain to bottom of left foot which she describes as aching, sore, and stinging. She reports she thinks it is plantar fascitis as she has had this before. She is requesting referral to podiatry.   She is seen in clinic with sister today who has decreased hearing and is being sent to audiology. This patient is also requesting to go to Audiology for hearing testing. She states she has had hearing aides in the past but never really uses them.       Review of Systems     @Review of Systems   Constitutional:  Negative for activity change, appetite change, fatigue and fever.   HENT:  Negative for nasal congestion, ear pain, rhinorrhea, sinus pressure/congestion and sore throat.    Eyes:  Negative for pain, redness, visual disturbance and eye dryness.   Respiratory:  Negative for cough and shortness of breath.    Cardiovascular:  Negative for chest pain and leg swelling.   Gastrointestinal:  Negative for abdominal distention, abdominal pain, constipation and diarrhea.   Endocrine: Negative for cold intolerance, heat intolerance and polyuria.   Genitourinary:  Negative for bladder incontinence, dysuria, frequency and urgency.   Musculoskeletal:  Positive for  arthralgias. Negative for gait problem and myalgias.   Integumentary:  Negative for color change, rash and wound.   Allergic/Immunologic: Negative for environmental allergies and food allergies.   Neurological:  Negative for dizziness, weakness, light-headedness and headaches.   Psychiatric/Behavioral:  Negative for behavioral problems and sleep disturbance.      Medical / Social / Family History     Past Medical History:   Diagnosis Date    Arthritis     Hyperlipidemia     Hypertension     Left hip pain     Osteoporosis     Venous insufficiency, peripheral        Past Surgical History:   Procedure Laterality Date    EXCISION OF BREAST LESION Right 1970    KNEE ARTHROSCOPY Right 2020    low back disc surgery  2014    in [place]    TUBAL LIGATION      VAGINAL DELIVERY  1980    VAGINAL DELIVERY  1975    VENOUS ABLATION Right 09/18/2020    Anterior Shin Varithena Ablation performed by DR. Cesar Dumont.    VENOUS ABLATION Left 09/11/2020    Lower Anterior Shin Varithen Ablation performed by Dr. Cesar Dumont.       Social History  Ms.  reports that she has never smoked. She has never been exposed to tobacco smoke. She has never used smokeless tobacco. She reports current alcohol use. She reports that she does not use drugs.    Family History  Ms.'s family history includes Breast cancer in her sister; Cancer in her maternal grandmother; Colon cancer in her brother and sister; Colon polyps in her sister; Diabetes in her father; Heart disease in her maternal grandfather; Kidney disease in her father; Stroke in her mother; Sudden death in her maternal grandfather.    Medications and Allergies     Medications  Outpatient Medications Marked as Taking for the 3/7/23 encounter (Office Visit) with Nikki Bridges NP   Medication Sig Dispense Refill    aspirin (ECOTRIN) 81 MG EC tablet Take 81 mg by mouth once daily.      BEPREVE 1.5 % Drop Place 1 drop into both eyes 2 (two) times daily.      calcium carb and citrate-vitD3 600  mg calcium- 500 unit TbSR Take 1 tablet by mouth once daily.      denosumab (PROLIA) 60 mg/mL Syrg Inject 60 mg into the skin every 6 (six) months.      DUREZOL 0.05 % Drop ophthalmic solution       fluticasone propionate (FLOVENT DISKUS) 50 mcg/actuation DsDv Inhale 1 spray into the lungs 2 (two) times a day. Controller 3 each 1    gabapentin (NEURONTIN) 100 MG capsule Take 100 mg by mouth 3 (three) times daily.      garlic 1,000 mg Cap Take 1 capsule by mouth once daily.      loratadine (CLARITIN) 10 mg tablet Take 1 tablet (10 mg total) by mouth once daily. 90 tablet 1    meloxicam (MOBIC) 15 MG tablet Take 1 tablet (15 mg total) by mouth once daily. (Patient taking differently: Take 15 mg by mouth daily as needed.) 90 tablet 1    olmesartan-hydrochlorothiazide (BENICAR HCT) 20-12.5 mg per tablet Take 1 tablet by mouth once daily. 90 tablet 1    rosuvastatin (CRESTOR) 10 MG tablet Take 1 tablet (10 mg total) by mouth once daily. 90 tablet 3    tiZANidine (ZANAFLEX) 4 MG tablet TAKE 1 TABLET BY MOUTH EVERY 6 HOURS AS NEEDED FOR MUSCLE SPASMS      traMADoL (ULTRAM) 50 mg tablet Take 1 tablet (50 mg total) by mouth every 12 (twelve) hours as needed for Pain. 12 tablet 0    triamcinolone acetonide 0.1% (KENALOG) 0.1 % cream Apply topically 2 (two) times daily. 28 g 2    [DISCONTINUED] diclofenac sodium (VOLTAREN) 1 % Gel Apply 2 g topically once daily. 3 each 1    [DISCONTINUED] ergocalciferol (ERGOCALCIFEROL) 50,000 unit Cap Take 1 capsule (50,000 Units total) by mouth every 7 days. 12 capsule 1    [DISCONTINUED] hydrOXYzine pamoate (VISTARIL) 25 MG Cap Take 1 capsule (25 mg total) by mouth 2 (two) times daily as needed (itching). 180 capsule 1     Current Facility-Administered Medications for the 3/7/23 encounter (Office Visit) with Nikki Bridges NP   Medication Dose Route Frequency Provider Last Rate Last Admin    [COMPLETED] ketorolac injection 30 mg  30 mg Intramuscular 1 time in Clinic/HOD Nikki Bridges  NP   30 mg at 03/07/23 1510       Allergies  Review of patient's allergies indicates:  No Known Allergies    Physical Examination     Vitals:    03/07/23 1355   BP: (!) 140/82   Pulse: 72   Resp: 19   Temp: 98.3 °F (36.8 °C)     Physical Exam  Vitals and nursing note reviewed.   Constitutional:       Appearance: She is obese.   HENT:      Head: Normocephalic.      Right Ear: Tympanic membrane normal.      Left Ear: Tympanic membrane normal. There is impacted cerumen.      Nose: Nose normal.      Mouth/Throat:      Mouth: Mucous membranes are moist.      Pharynx: Oropharynx is clear. No posterior oropharyngeal erythema.   Eyes:      Conjunctiva/sclera: Conjunctivae normal.   Cardiovascular:      Rate and Rhythm: Normal rate and regular rhythm.      Pulses: Normal pulses.      Heart sounds: Normal heart sounds.   Pulmonary:      Effort: Pulmonary effort is normal.      Breath sounds: Normal breath sounds.   Abdominal:      General: Abdomen is flat. Bowel sounds are normal. There is no distension.      Palpations: Abdomen is soft.   Musculoskeletal:         General: No swelling or tenderness. Normal range of motion.      Cervical back: Normal range of motion.      Right lower leg: No edema.      Left lower leg: No edema.   Feet:      Comments: Tenderness to plantar left foot.   Skin:     General: Skin is warm and dry.      Capillary Refill: Capillary refill takes less than 2 seconds.   Neurological:      Mental Status: She is alert. Mental status is at baseline.   Psychiatric:         Mood and Affect: Mood normal.         Behavior: Behavior normal.             Lab Results   Component Value Date    WBC 4.89 12/12/2022    HGB 13.4 12/12/2022    HCT 51.2 (H) 12/12/2022    .3 (H) 12/12/2022     12/12/2022          Sodium   Date Value Ref Range Status   01/25/2023 139 136 - 145 mmol/L Final     Potassium   Date Value Ref Range Status   01/25/2023 4.3 3.5 - 5.1 mmol/L Final     Chloride   Date Value Ref Range  Status   01/25/2023 104 98 - 107 mmol/L Final     CO2   Date Value Ref Range Status   01/25/2023 31 21 - 32 mmol/L Final     Glucose   Date Value Ref Range Status   01/25/2023 93 74 - 106 mg/dL Final     BUN   Date Value Ref Range Status   01/25/2023 19 (H) 7 - 18 mg/dL Final     Creatinine   Date Value Ref Range Status   01/25/2023 1.04 (H) 0.55 - 1.02 mg/dL Final     Calcium   Date Value Ref Range Status   01/25/2023 9.8 8.5 - 10.1 mg/dL Final     Total Protein   Date Value Ref Range Status   01/25/2023 7.5 6.4 - 8.2 g/dL Final     Albumin   Date Value Ref Range Status   01/25/2023 3.4 (L) 3.5 - 5.0 g/dL Final     Bilirubin, Total   Date Value Ref Range Status   01/25/2023 0.4 >0.0 - 1.2 mg/dL Final     Alk Phos   Date Value Ref Range Status   01/25/2023 61 55 - 142 U/L Final     AST   Date Value Ref Range Status   01/25/2023 23 15 - 37 U/L Final     ALT   Date Value Ref Range Status   01/25/2023 35 13 - 56 U/L Final     Anion Gap   Date Value Ref Range Status   01/25/2023 8 7 - 16 mmol/L Final     eGFR    Date Value Ref Range Status   09/21/2021 72 >=60 mL/min/1.73m² Final     eGFR   Date Value Ref Range Status   10/21/2020 65        DXA Bone Density Spine And Hip  Narrative: EXAMINATION:  DEXA BONE DENSITY SPINE HIP    CLINICAL HISTORY:  Asymptomatic menopausal state    COMPARISON:  None    FINDINGS:  Posterior fusion hardware noted at the lower lumbar spine.    L1-L3 spine:    BMD: 1.030 g/cm^2    T-score: 0.1    Z-score: 2.5    Left femur/hip:    BMD: 0.636 g/cm^2    T-score: -1.9    Z-score: 0.2  Impression: T score of -1.9 obtained from the left femoral neck which is considered in the range of osteopenia.    Recommendations:    1. Encourage adequate intake of calcium and vitamin D if clinically appropriate.    2. Consider regular weight-bearing and muscle strengthening exercises if clinically appropriate.    3. Consider hormone replacement therapy if clinically appropriate.    4. Consider  antiresorptive therapy if clinically appropriate.    5. Consider followup BMD every 1-2 years if clinically appropriate.    6. Advise patient to avoid tobacco smoking and to not over use alcohol.    Place of service: [facility].    Electronically signed by: Alonzo Man  Date:    02/02/2023  Time:    15:05     Ear Cerumen Removal    Date/Time: 3/7/2023 1:45 PM  Performed by: Nikki Bridges NP  Authorized by: Nikki Bridges NP     Consent Done?:  Yes (Verbal)    Local anesthetic:  None  Medication Used:  Other  Location details:  Left ear  Procedure type: irrigation    Cerumen  Removal Results:  Cerumen completely removed  Patient tolerance:  Patient tolerated the procedure well with no immediate complications   Assessment and Plan (including Health Maintenance)      Problem List  Smart Sets  Document Outside HM   :    Plan:           Problem List Items Addressed This Visit          ENT    Impacted cerumen of left ear    Relevant Orders    Ear Cerumen Removal    Decreased hearing of both ears    Relevant Orders    Ambulatory referral/consult to Audiology       Endocrine    Vitamin D deficiency    Current Assessment & Plan     Refilled Ergocalciferol. Continue at current dosage.          Relevant Medications    ergocalciferol (ERGOCALCIFEROL) 50,000 unit Cap       Orthopedic    Left hip pain    Current Assessment & Plan     Continue Voltaren gel as needed. Follow up on as needed basis.          Relevant Medications    diclofenac sodium (VOLTAREN) 1 % Gel    Plantar fasciitis, left - Primary    Current Assessment & Plan     Toradol IM given in clinic. Medrol Dose Pack as ordered. Will refer to podiatry.    -Will start on MDP.  -Encouraged to wear good fitting shoes with supportive insoles  -Sample stretches provided. Encouraged to stretch and perform exercises twice daily.   -Encouraged to attain and maintain a healthy body weight. Patient was instructed to follow up if symptoms persist past current treatment  plan to discuss further options, such as physical therapy, referral to ortho or diagnostic imaging.            Relevant Medications    methylPREDNISolone (MEDROL DOSEPACK) 4 mg tablet    ketorolac injection 30 mg (Completed)    Other Relevant Orders    Ambulatory referral/consult to Podiatry       Health Maintenance Topics with due status: Not Due       Topic Last Completion Date    Colorectal Cancer Screening 10/13/2021    Lipid Panel 01/25/2023    DEXA Scan 02/02/2023       Future Appointments   Date Time Provider Department Center   9/5/2023  9:30 AM INFUSION, [facility] INFUS[facility]        Health Maintenance Due   Topic Date Due    TETANUS VACCINE  Never done    Shingles Vaccine (2 of 3) 12/10/2013        No follow-ups on file.     Signature:  Nikki Bridges NP  [address] [place]  [address]    Date of encounter: 3/7/23

## 2023-03-08 NOTE — ASSESSMENT & PLAN NOTE
Toradol IM given in clinic. Medrol Dose Pack as ordered. Will refer to podiatry.    -Will start on MDP.  -Encouraged to wear good fitting shoes with supportive insoles  -Sample stretches provided. Encouraged to stretch and perform exercises twice daily.   -Encouraged to attain and maintain a healthy body weight. Patient was instructed to follow up if symptoms persist past current treatment plan to discuss further options, such as physical therapy, referral to ortho or diagnostic imaging.

## 2023-03-20 ENCOUNTER — PATIENT OUTREACH (OUTPATIENT)
Dept: ADMINISTRATIVE | Facility: HOSPITAL | Age: 73
End: 2023-03-20
Payer: COMMERCIAL

## 2023-03-20 NOTE — PROGRESS NOTES
UPLOADED MAMMOGRAM DONE ON 11/14/22 AND BREAST ULTRASOUND-DONE 2/15/23-FAMILY HX OF BREAST CANCER-IS TO REPEAT BREAST US IN 3 MONTHS-NESSA

## 2023-03-21 ENCOUNTER — OFFICE VISIT (OUTPATIENT)
Dept: FAMILY MEDICINE | Facility: CLINIC | Age: 73
End: 2023-03-21
Payer: COMMERCIAL

## 2023-03-21 VITALS
WEIGHT: 178 LBS | HEART RATE: 71 BPM | TEMPERATURE: 98 F | SYSTOLIC BLOOD PRESSURE: 132 MMHG | HEIGHT: 65 IN | OXYGEN SATURATION: 97 % | DIASTOLIC BLOOD PRESSURE: 86 MMHG | RESPIRATION RATE: 18 BRPM | BODY MASS INDEX: 29.66 KG/M2

## 2023-03-21 DIAGNOSIS — N32.81 OVERACTIVE BLADDER: Primary | ICD-10-CM

## 2023-03-21 DIAGNOSIS — G25.81 RESTLESS LEGS: ICD-10-CM

## 2023-03-21 LAB
BILIRUB SERPL-MCNC: NEGATIVE MG/DL
BLOOD URINE, POC: NEGATIVE
COLOR, POC UA: YELLOW
GLUCOSE UR QL STRIP: NEGATIVE
KETONES UR QL STRIP: NEGATIVE
LEUKOCYTE ESTERASE URINE, POC: NEGATIVE
NITRITE, POC UA: NEGATIVE
PH, POC UA: 6.5
PROTEIN, POC: NEGATIVE
SPECIFIC GRAVITY, POC UA: 1.02
UROBILINOGEN, POC UA: 0.2

## 2023-03-21 PROCEDURE — 81003 POCT URINALYSIS W/O SCOPE: ICD-10-PCS | Mod: QW,,, | Performed by: NURSE PRACTITIONER

## 2023-03-21 PROCEDURE — 3075F SYST BP GE 130 - 139MM HG: CPT | Mod: ,,, | Performed by: NURSE PRACTITIONER

## 2023-03-21 PROCEDURE — 99213 PR OFFICE/OUTPT VISIT, EST, LEVL III, 20-29 MIN: ICD-10-PCS | Mod: ,,, | Performed by: NURSE PRACTITIONER

## 2023-03-21 PROCEDURE — 87086 URINE CULTURE/COLONY COUNT: CPT | Mod: ,,, | Performed by: CLINICAL MEDICAL LABORATORY

## 2023-03-21 PROCEDURE — 1126F AMNT PAIN NOTED NONE PRSNT: CPT | Mod: ,,, | Performed by: NURSE PRACTITIONER

## 2023-03-21 PROCEDURE — 1101F PT FALLS ASSESS-DOCD LE1/YR: CPT | Mod: ,,, | Performed by: NURSE PRACTITIONER

## 2023-03-21 PROCEDURE — 3079F PR MOST RECENT DIASTOLIC BLOOD PRESSURE 80-89 MM HG: ICD-10-PCS | Mod: ,,, | Performed by: NURSE PRACTITIONER

## 2023-03-21 PROCEDURE — 3288F FALL RISK ASSESSMENT DOCD: CPT | Mod: ,,, | Performed by: NURSE PRACTITIONER

## 2023-03-21 PROCEDURE — 81003 URINALYSIS AUTO W/O SCOPE: CPT | Mod: QW,,, | Performed by: NURSE PRACTITIONER

## 2023-03-21 PROCEDURE — 1126F PR PAIN SEVERITY QUANTIFIED, NO PAIN PRESENT: ICD-10-PCS | Mod: ,,, | Performed by: NURSE PRACTITIONER

## 2023-03-21 PROCEDURE — 1101F PR PT FALLS ASSESS DOC 0-1 FALLS W/OUT INJ PAST YR: ICD-10-PCS | Mod: ,,, | Performed by: NURSE PRACTITIONER

## 2023-03-21 PROCEDURE — 3079F DIAST BP 80-89 MM HG: CPT | Mod: ,,, | Performed by: NURSE PRACTITIONER

## 2023-03-21 PROCEDURE — 3075F PR MOST RECENT SYSTOLIC BLOOD PRESS GE 130-139MM HG: ICD-10-PCS | Mod: ,,, | Performed by: NURSE PRACTITIONER

## 2023-03-21 PROCEDURE — 87086 CULTURE, URINE: ICD-10-PCS | Mod: ,,, | Performed by: CLINICAL MEDICAL LABORATORY

## 2023-03-21 PROCEDURE — 3288F PR FALLS RISK ASSESSMENT DOCUMENTED: ICD-10-PCS | Mod: ,,, | Performed by: NURSE PRACTITIONER

## 2023-03-21 PROCEDURE — 99213 OFFICE O/P EST LOW 20 MIN: CPT | Mod: ,,, | Performed by: NURSE PRACTITIONER

## 2023-03-21 RX ORDER — DOCUSATE SODIUM 100 MG/1
1 CAPSULE, LIQUID FILLED ORAL DAILY
COMMUNITY

## 2023-03-21 RX ORDER — GABAPENTIN 300 MG/1
300 CAPSULE ORAL NIGHTLY
Qty: 30 CAPSULE | Refills: 3 | Status: SHIPPED | OUTPATIENT
Start: 2023-03-21 | End: 2023-04-19

## 2023-03-21 RX ORDER — OXYBUTYNIN CHLORIDE 5 MG/1
5 TABLET ORAL 3 TIMES DAILY
Qty: 90 TABLET | Refills: 0 | Status: SHIPPED | OUTPATIENT
Start: 2023-03-21 | End: 2023-04-11

## 2023-03-21 RX ORDER — CEPHALEXIN 250 MG
CAPSULE ORAL
COMMUNITY
End: 2023-11-30

## 2023-03-21 NOTE — PROGRESS NOTES
"   Nikki Bridges NP   [place] [address]  [place]  [address]      PATIENT NAME: Delmis Shell  : 1950  DATE: 3/21/23  MRN: [id number]      Billing Provider: Nikki Bridges NP  Level of Service: [place] OFFICE/OUTPT VISIT, EST, LEVL III, 20-29 MIN  Patient PCP Information       Provider PCP Type    Nikki Bridges NP General            Reason for Visit / Chief Complaint: Urinary Frequency (X2 weeks. No burning or irritation, bladder just feels full. ) and Muscle Pain (Muscle cramps in both legs. States the muscle relaxer's are not working.)       Update PCP  Update Chief Complaint         History of Present Illness / Problem Focused Workflow     Delmis Shell presents to the clinic with Urinary Frequency (X2 weeks. No burning or irritation, bladder just feels full. ) and Muscle Pain (Muscle cramps in both legs. States the muscle relaxer's are not working.)     73 year old presents to clinic with complaints of urinary frequency x 2 weeks. Denies burning or irritation. States she just has to go "all the time." She also reports burning/aching pain in bilat lower ext, worse at night. States she has tried muscle relaxers and they are not helping.     Review of Systems     @Review of Systems   Constitutional:  Negative for activity change, appetite change, fatigue and fever.   HENT:  Negative for nasal congestion, ear pain, rhinorrhea, sinus pressure/congestion and sore throat.    Eyes:  Negative for pain, redness, visual disturbance and eye dryness.   Respiratory:  Negative for cough and shortness of breath.    Cardiovascular:  Negative for chest pain and leg swelling.   Gastrointestinal:  Negative for abdominal distention, abdominal pain, constipation and diarrhea.   Endocrine: Negative for cold intolerance, heat intolerance and polyuria.   Genitourinary:  Positive for frequency. Negative for bladder incontinence, dysuria and urgency.   Musculoskeletal:  Positive for leg pain. " Negative for arthralgias, gait problem and myalgias.   Integumentary:  Negative for color change, rash and wound.   Allergic/Immunologic: Negative for environmental allergies and food allergies.   Neurological:  Negative for dizziness, weakness, light-headedness and headaches.   Psychiatric/Behavioral:  Negative for behavioral problems and sleep disturbance.      Medical / Social / Family History     Past Medical History:   Diagnosis Date    Arthritis     Family history of breast cancer in sister 11/14/2022    Hyperlipidemia     Hypertension     Left hip pain     Osteoporosis     Venous insufficiency, peripheral        Past Surgical History:   Procedure Laterality Date    EXCISION OF BREAST LESION Right 1970    KNEE ARTHROSCOPY Right 2020    low back disc surgery  2014    in [place]    TUBAL LIGATION      VAGINAL DELIVERY  1980    VAGINAL DELIVERY  1975    VENOUS ABLATION Right 09/18/2020    Anterior Shin Varithena Ablation performed by DR. Cesar Dumont.    VENOUS ABLATION Left 09/11/2020    Lower Anterior Shin Varithen Ablation performed by Dr. Cesar Dumont.       Social History  Ms.  reports that she has never smoked. She has never been exposed to tobacco smoke. She has never used smokeless tobacco. She reports current alcohol use. She reports that she does not use drugs.    Family History  Ms.'s family history includes Breast cancer in her sister; Cancer in her maternal grandmother; Colon cancer in her brother and sister; Colon polyps in her sister; Diabetes in her father; Heart disease in her maternal grandfather; Kidney disease in her father; Stroke in her mother; Sudden death in her maternal grandfather.    Medications and Allergies     Medications  Outpatient Medications Marked as Taking for the 3/21/23 encounter (Office Visit) with Nikki Bridges NP   Medication Sig Dispense Refill    aspirin (ECOTRIN) 81 MG EC tablet Take 81 mg by mouth once daily.      BEPREVE 1.5 % Drop Place 1 drop into both eyes 2  (two) times daily.      cyclobenzaprine (FLEXERIL) 10 MG tablet Take 1 tablet (10 mg total) by mouth every evening. 90 tablet 1    denosumab (PROLIA) 60 mg/mL Syrg Inject 60 mg into the skin every 6 (six) months.      diclofenac sodium (VOLTAREN) 1 % Gel Apply 2 g topically once daily. 3 each 1    docusate sodium (COLACE) 100 MG capsule 1 capsule as needed.      DUREZOL 0.05 % Drop ophthalmic solution       ergocalciferol (ERGOCALCIFEROL) 50,000 unit Cap Take 1 capsule (50,000 Units total) by mouth every 7 days. 12 capsule 1    fluticasone propionate (FLOVENT DISKUS) 50 mcg/actuation DsDv Inhale 1 spray into the lungs 2 (two) times a day. Controller 3 each 1    garlic 1,000 mg Cap Take 1 capsule by mouth once daily.      hydrOXYzine pamoate (VISTARIL) 25 MG Cap Take 1 capsule (25 mg total) by mouth 2 (two) times daily as needed (itching). 180 capsule 1    loratadine (CLARITIN) 10 mg tablet Take 1 tablet (10 mg total) by mouth once daily. 90 tablet 1    lovastatin (MEVACOR) 40 MG tablet TAKE 1 TABLET BY MOUTH EVERY EVENING (Patient not taking: Reported on 2023) 90 tablet 0    meloxicam (MOBIC) 15 MG tablet Take 1 tablet (15 mg total) by mouth once daily. (Patient taking differently: Take 15 mg by mouth daily as needed.) 90 tablet 1    [] methylPREDNISolone (MEDROL DOSEPACK) 4 mg tablet use as directed 21 each 0    rosuvastatin (CRESTOR) 10 MG tablet Take 1 tablet (10 mg total) by mouth once daily. 90 tablet 3    triamcinolone acetonide 0.1% (KENALOG) 0.1 % cream Apply topically 2 (two) times daily. 28 g 2    vit c-ascorbate Ca-ascorb sod (VITAMIN C) 500 mg/15 mL Liqd       [DISCONTINUED] gabapentin (NEURONTIN) 100 MG capsule Take 100 mg by mouth 3 (three) times daily.      [DISCONTINUED] olmesartan-hydrochlorothiazide (BENICAR HCT) 20-12.5 mg per tablet Take 1 tablet by mouth once daily. 90 tablet 1    [DISCONTINUED] tiZANidine (ZANAFLEX) 4 MG tablet TAKE 1 TABLET BY MOUTH EVERY 6 HOURS AS NEEDED FOR  MUSCLE SPASMS         Allergies  Review of patient's allergies indicates:  No Known Allergies    Physical Examination     Vitals:    03/21/23 1423   BP: 132/86   Pulse: 71   Resp: 18   Temp: 97.7 °F (36.5 °C)     Physical Exam  Vitals and nursing note reviewed.   HENT:      Head: Normocephalic.      Right Ear: Tympanic membrane normal.      Left Ear: Tympanic membrane normal.      Nose: Nose normal.      Mouth/Throat:      Mouth: Mucous membranes are moist.      Pharynx: Oropharynx is clear. No posterior oropharyngeal erythema.   Eyes:      Conjunctiva/sclera: Conjunctivae normal.   Cardiovascular:      Rate and Rhythm: Normal rate and regular rhythm.      Pulses: Normal pulses.      Heart sounds: Normal heart sounds.   Pulmonary:      Effort: Pulmonary effort is normal.      Breath sounds: Normal breath sounds.   Abdominal:      General: Abdomen is flat. Bowel sounds are normal. There is no distension.      Palpations: Abdomen is soft.   Musculoskeletal:         General: No swelling or tenderness. Normal range of motion.      Cervical back: Normal range of motion.      Right lower leg: No edema.      Left lower leg: No edema.   Skin:     General: Skin is warm and dry.      Capillary Refill: Capillary refill takes less than 2 seconds.   Neurological:      Mental Status: She is alert. Mental status is at baseline.   Psychiatric:         Mood and Affect: Mood normal.         Behavior: Behavior normal.             Lab Results   Component Value Date    WBC 4.89 12/12/2022    HGB 13.4 12/12/2022    HCT 51.2 (H) 12/12/2022    .3 (H) 12/12/2022     12/12/2022          Sodium   Date Value Ref Range Status   01/25/2023 139 136 - 145 mmol/L Final     Potassium   Date Value Ref Range Status   01/25/2023 4.3 3.5 - 5.1 mmol/L Final     Chloride   Date Value Ref Range Status   01/25/2023 104 98 - 107 mmol/L Final     CO2   Date Value Ref Range Status   01/25/2023 31 21 - 32 mmol/L Final     Glucose   Date Value Ref  Range Status   01/25/2023 93 74 - 106 mg/dL Final     BUN   Date Value Ref Range Status   01/25/2023 19 (H) 7 - 18 mg/dL Final     Creatinine   Date Value Ref Range Status   01/25/2023 1.04 (H) 0.55 - 1.02 mg/dL Final     Calcium   Date Value Ref Range Status   01/25/2023 9.8 8.5 - 10.1 mg/dL Final     Total Protein   Date Value Ref Range Status   01/25/2023 7.5 6.4 - 8.2 g/dL Final     Albumin   Date Value Ref Range Status   01/25/2023 3.4 (L) 3.5 - 5.0 g/dL Final     Bilirubin, Total   Date Value Ref Range Status   01/25/2023 0.4 >0.0 - 1.2 mg/dL Final     Alk Phos   Date Value Ref Range Status   01/25/2023 61 55 - 142 U/L Final     AST   Date Value Ref Range Status   01/25/2023 23 15 - 37 U/L Final     ALT   Date Value Ref Range Status   01/25/2023 35 13 - 56 U/L Final     Anion Gap   Date Value Ref Range Status   01/25/2023 8 7 - 16 mmol/L Final     eGFR    Date Value Ref Range Status   09/21/2021 72 >=60 mL/min/1.73m² Final     eGFR   Date Value Ref Range Status   10/21/2020 65        US Breast Left Limited  REPEAT IN 3 MONTHS-SEE SCANNED IN DOCUMENT     Procedures   Assessment and Plan (including Health Maintenance)      Problem List  Smart Sets  Document Outside HM   :    Plan:           Problem List Items Addressed This Visit          Neuro    Restless legs       Renal/    Overactive bladder - Primary    Relevant Orders    POCT URINALYSIS W/O SCOPE (Completed)    Urine culture (Completed)       Health Maintenance Topics with due status: Not Due       Topic Last Completion Date    Colorectal Cancer Screening 10/13/2021    Mammogram 11/14/2022    Lipid Panel 01/25/2023    DEXA Scan 02/02/2023       Future Appointments   Date Time Provider Department Center   9/5/2023  9:30 AM INFUSION, [facility] INFUS[facility]        Health Maintenance Due   Topic Date Due    TETANUS VACCINE  Never done    Shingles Vaccine (2 of 3) 12/10/2013        No follow-ups on file.     Signature:  Nikki  KRISTIE Bridges  [place] [address]  [place]  [address]    Date of encounter: 3/21/23

## 2023-03-23 LAB — UA COMPLETE W REFLEX CULTURE PNL UR: NORMAL

## 2023-03-23 NOTE — PROGRESS NOTES
Labs reviewed: Urine culture showed no growth, so no infection present. Continue medication for overactive bladder. Follow up as needed.

## 2023-04-11 RX ORDER — TIZANIDINE 4 MG/1
TABLET ORAL
Qty: 30 TABLET | Refills: 3 | Status: SHIPPED | OUTPATIENT
Start: 2023-04-11 | End: 2024-02-12 | Stop reason: SDUPTHER

## 2023-04-11 RX ORDER — OXYBUTYNIN CHLORIDE 5 MG/1
TABLET ORAL
Qty: 90 TABLET | Refills: 3 | Status: SHIPPED | OUTPATIENT
Start: 2023-04-11 | End: 2023-04-19

## 2023-04-19 DIAGNOSIS — I10 HYPERTENSION, UNSPECIFIED TYPE: ICD-10-CM

## 2023-04-19 RX ORDER — OXYBUTYNIN CHLORIDE 5 MG/1
TABLET ORAL
Qty: 90 TABLET | Refills: 3 | Status: SHIPPED | OUTPATIENT
Start: 2023-04-19 | End: 2023-07-17

## 2023-04-19 RX ORDER — OLMESARTAN MEDOXOMIL AND HYDROCHLOROTHIAZIDE 20/12.5 20; 12.5 MG/1; MG/1
TABLET ORAL
Qty: 90 TABLET | Refills: 1 | Status: SHIPPED | OUTPATIENT
Start: 2023-04-19 | End: 2023-11-16

## 2023-04-19 RX ORDER — GABAPENTIN 300 MG/1
CAPSULE ORAL
Qty: 30 CAPSULE | Refills: 3 | Status: SHIPPED | OUTPATIENT
Start: 2023-04-19 | End: 2023-07-17

## 2023-04-20 ENCOUNTER — OFFICE VISIT (OUTPATIENT)
Dept: OTOLARYNGOLOGY | Facility: CLINIC | Age: 73
End: 2023-04-20
Payer: COMMERCIAL

## 2023-04-20 ENCOUNTER — CLINICAL SUPPORT (OUTPATIENT)
Dept: AUDIOLOGY | Facility: CLINIC | Age: 73
End: 2023-04-20
Payer: COMMERCIAL

## 2023-04-20 DIAGNOSIS — H90.3 SENSORINEURAL HEARING LOSS (SNHL) OF BOTH EARS: Primary | ICD-10-CM

## 2023-04-20 DIAGNOSIS — H90.3 SENSORINEURAL HEARING LOSS, BILATERAL: Primary | ICD-10-CM

## 2023-04-20 DIAGNOSIS — H93.19 TINNITUS, UNSPECIFIED LATERALITY: ICD-10-CM

## 2023-04-20 PROCEDURE — 1160F PR REVIEW ALL MEDS BY PRESCRIBER/CLIN PHARMACIST DOCUMENTED: ICD-10-PCS | Mod: CPTII,,, | Performed by: OTOLARYNGOLOGY

## 2023-04-20 PROCEDURE — 1159F PR MEDICATION LIST DOCUMENTED IN MEDICAL RECORD: ICD-10-PCS | Mod: CPTII,,, | Performed by: OTOLARYNGOLOGY

## 2023-04-20 PROCEDURE — 99204 OFFICE O/P NEW MOD 45 MIN: CPT | Mod: S$PBB,,, | Performed by: OTOLARYNGOLOGY

## 2023-04-20 PROCEDURE — 99204 PR OFFICE/OUTPT VISIT, NEW, LEVL IV, 45-59 MIN: ICD-10-PCS | Mod: S$PBB,,, | Performed by: OTOLARYNGOLOGY

## 2023-04-20 PROCEDURE — 99212 OFFICE O/P EST SF 10 MIN: CPT | Mod: PBBFAC | Performed by: OTOLARYNGOLOGY

## 2023-04-20 PROCEDURE — 1160F RVW MEDS BY RX/DR IN RCRD: CPT | Mod: CPTII,,, | Performed by: OTOLARYNGOLOGY

## 2023-04-20 PROCEDURE — 92557 COMPREHENSIVE HEARING TEST: CPT | Mod: PBBFAC | Performed by: AUDIOLOGIST

## 2023-04-20 PROCEDURE — 1159F MED LIST DOCD IN RCRD: CPT | Mod: CPTII,,, | Performed by: OTOLARYNGOLOGY

## 2023-04-20 PROCEDURE — 99212 OFFICE O/P EST SF 10 MIN: CPT | Mod: PBBFAC | Performed by: AUDIOLOGIST

## 2023-04-20 NOTE — PROGRESS NOTES
Subjective:       Patient ID: Delmis Shell is a 73 y.o. female.    Chief Complaint: No chief complaint on file.  C/o hearing loss has some OTC aids not helping  HPI  Review of Systems   HENT:  Positive for hearing loss.    All other systems reviewed and are negative.    Objective:      Physical Exam  General: NAD  Head: Normocephalic, atraumatic, no facial asymmetry/normal strength,  Ears: Both auricules normal in appearance, w/o deformities tympanic membranes normal external auditory canals normal  Nose: External nose w/o deformities normal turbinates no drainage or inflammation  Oral Cavity: Lips, gums, floor of mouth, tongue hard palate, and buccal mucosa without mass/lesion  Oropharynx: Mucosa pink and moist, soft palate, posterior pharynx and oropharyngeal wall without mass/lesion  Neck: Supple, symmetric, trachea midline, no palpable mass/lesion, no palpable cervical lymphadenopathy  Skin: Warm and dry, no concerning lesions  Respiratory: Respirations even, unlabored   Assessment:       1. Sensorineural hearing loss (SNHL) of both ears    2. Tinnitus, unspecified laterality        Plan:       Discussed audio in detail   Rec hearing aids discussed old aids

## 2023-05-16 ENCOUNTER — CLINICAL SUPPORT (OUTPATIENT)
Dept: FAMILY MEDICINE | Facility: CLINIC | Age: 73
End: 2023-05-16
Payer: COMMERCIAL

## 2023-05-16 VITALS — DIASTOLIC BLOOD PRESSURE: 70 MMHG | SYSTOLIC BLOOD PRESSURE: 118 MMHG

## 2023-05-16 NOTE — PROGRESS NOTES
Patient here requesting for blood pressure to be checked.  Reports that it was low last night at home and she was experiencing some dizziness.  Unable to provide blood pressure reading from last night.  Blood pressure checked 118/70.  Denies dizziness or any other symptoms at this time.  Instructed to monitor blood pressure and return to clinic or ER for any other episodes.

## 2023-05-22 ENCOUNTER — OFFICE VISIT (OUTPATIENT)
Dept: FAMILY MEDICINE | Facility: CLINIC | Age: 73
End: 2023-05-22
Payer: COMMERCIAL

## 2023-05-22 VITALS
SYSTOLIC BLOOD PRESSURE: 138 MMHG | BODY MASS INDEX: 29.96 KG/M2 | DIASTOLIC BLOOD PRESSURE: 76 MMHG | TEMPERATURE: 98 F | WEIGHT: 179.81 LBS | RESPIRATION RATE: 20 BRPM | HEART RATE: 90 BPM | OXYGEN SATURATION: 97 % | HEIGHT: 65 IN

## 2023-05-22 DIAGNOSIS — J01.40 ACUTE NON-RECURRENT PANSINUSITIS: Primary | ICD-10-CM

## 2023-05-22 PROCEDURE — 3075F PR MOST RECENT SYSTOLIC BLOOD PRESS GE 130-139MM HG: ICD-10-PCS | Mod: ,,, | Performed by: NURSE PRACTITIONER

## 2023-05-22 PROCEDURE — 1160F RVW MEDS BY RX/DR IN RCRD: CPT | Mod: ,,, | Performed by: NURSE PRACTITIONER

## 2023-05-22 PROCEDURE — 3288F FALL RISK ASSESSMENT DOCD: CPT | Mod: ,,, | Performed by: NURSE PRACTITIONER

## 2023-05-22 PROCEDURE — 1101F PR PT FALLS ASSESS DOC 0-1 FALLS W/OUT INJ PAST YR: ICD-10-PCS | Mod: ,,, | Performed by: NURSE PRACTITIONER

## 2023-05-22 PROCEDURE — 1159F PR MEDICATION LIST DOCUMENTED IN MEDICAL RECORD: ICD-10-PCS | Mod: ,,, | Performed by: NURSE PRACTITIONER

## 2023-05-22 PROCEDURE — 96372 THER/PROPH/DIAG INJ SC/IM: CPT | Mod: ,,, | Performed by: NURSE PRACTITIONER

## 2023-05-22 PROCEDURE — 3078F DIAST BP <80 MM HG: CPT | Mod: ,,, | Performed by: NURSE PRACTITIONER

## 2023-05-22 PROCEDURE — 96372 PR INJECTION,THERAP/PROPH/DIAG2ST, IM OR SUBCUT: ICD-10-PCS | Mod: ,,, | Performed by: NURSE PRACTITIONER

## 2023-05-22 PROCEDURE — 99213 OFFICE O/P EST LOW 20 MIN: CPT | Mod: 25,,, | Performed by: NURSE PRACTITIONER

## 2023-05-22 PROCEDURE — 1160F PR REVIEW ALL MEDS BY PRESCRIBER/CLIN PHARMACIST DOCUMENTED: ICD-10-PCS | Mod: ,,, | Performed by: NURSE PRACTITIONER

## 2023-05-22 PROCEDURE — 99213 PR OFFICE/OUTPT VISIT, EST, LEVL III, 20-29 MIN: ICD-10-PCS | Mod: 25,,, | Performed by: NURSE PRACTITIONER

## 2023-05-22 PROCEDURE — 1159F MED LIST DOCD IN RCRD: CPT | Mod: ,,, | Performed by: NURSE PRACTITIONER

## 2023-05-22 PROCEDURE — 1101F PT FALLS ASSESS-DOCD LE1/YR: CPT | Mod: ,,, | Performed by: NURSE PRACTITIONER

## 2023-05-22 PROCEDURE — 3288F PR FALLS RISK ASSESSMENT DOCUMENTED: ICD-10-PCS | Mod: ,,, | Performed by: NURSE PRACTITIONER

## 2023-05-22 PROCEDURE — 3078F PR MOST RECENT DIASTOLIC BLOOD PRESSURE < 80 MM HG: ICD-10-PCS | Mod: ,,, | Performed by: NURSE PRACTITIONER

## 2023-05-22 PROCEDURE — 3075F SYST BP GE 130 - 139MM HG: CPT | Mod: ,,, | Performed by: NURSE PRACTITIONER

## 2023-05-22 RX ORDER — CEFTRIAXONE 1 G/1
1 INJECTION, POWDER, FOR SOLUTION INTRAMUSCULAR; INTRAVENOUS
Status: COMPLETED | OUTPATIENT
Start: 2023-05-22 | End: 2023-05-22

## 2023-05-22 RX ORDER — AZITHROMYCIN 250 MG/1
TABLET, FILM COATED ORAL
Qty: 6 TABLET | Refills: 0 | Status: SHIPPED | OUTPATIENT
Start: 2023-05-22 | End: 2023-05-27

## 2023-05-22 RX ORDER — OLOPATADINE HYDROCHLORIDE 1 MG/ML
1 SOLUTION/ DROPS OPHTHALMIC 2 TIMES DAILY
COMMUNITY
Start: 2023-05-02

## 2023-05-22 RX ORDER — DEXAMETHASONE SODIUM PHOSPHATE 4 MG/ML
4 INJECTION, SOLUTION INTRA-ARTICULAR; INTRALESIONAL; INTRAMUSCULAR; INTRAVENOUS; SOFT TISSUE
Status: COMPLETED | OUTPATIENT
Start: 2023-05-22 | End: 2023-05-22

## 2023-05-22 RX ORDER — GUAIFENESIN AND DEXTROMETHORPHAN HYDROBROMIDE 1200; 60 MG/1; MG/1
1 TABLET, EXTENDED RELEASE ORAL 2 TIMES DAILY
Qty: 30 TABLET | Refills: 0 | Status: SHIPPED | OUTPATIENT
Start: 2023-05-22 | End: 2023-06-01

## 2023-05-22 RX ADMIN — DEXAMETHASONE SODIUM PHOSPHATE 4 MG: 4 INJECTION, SOLUTION INTRA-ARTICULAR; INTRALESIONAL; INTRAMUSCULAR; INTRAVENOUS; SOFT TISSUE at 02:05

## 2023-05-22 RX ADMIN — CEFTRIAXONE 1 G: 1 INJECTION, POWDER, FOR SOLUTION INTRAMUSCULAR; INTRAVENOUS at 02:05

## 2023-05-22 NOTE — ASSESSMENT & PLAN NOTE
Rocephin and Decadron given IM in clinic.   Zithromax as ordered and Mucinex DM as needed.     Reviewed pathology of current symptoms, medication side effects/risk/benefits/directions on taking medications, and worsening or persistent symptoms that require follow up in next 2-3 days. Saline/steroid nasal sprays, antihistamine use, increase fluid intake, and multivitamin/elderberry/Zinc use were recommended. May take Tylenol or Motrin as needed for pain and/or fever. Patient was instructed to take antibiotic as directed, complete entire course to avoid antibiotic resistance, and take OTC probiotic with antibiotic to prevent GI upset. Patient verbalized understanding of treatment plan and denies any questions.

## 2023-05-22 NOTE — PROGRESS NOTES
Nikki Bridges NP   [place]  [place]  [address]      PATIENT NAME: Delmis Shell  : 1950  DATE: 23  MRN: [id number]      Billing Provider: Nikki Bridges NP  Level of Service: [place] OFFICE/OUTPT VISIT, EST, LEVL III, 20-29 MIN  Patient PCP Information       Provider PCP Type    Nikki Bridges NP General            Reason for Visit / Chief Complaint: Sore Throat (X 4 days ), Cough (Productive cough with yellow sputum ), and Headache (Headaches and eyes burning )         History of Present Illness / Problem Focused Workflow     Delmis Shell presents to the clinic with Sore Throat (X 4 days ), Cough (Productive cough with yellow sputum ), and Headache (Headaches and eyes burning )     73 year old female presents to clinic with complaints of sore throat, productive cough, and headache x 4 days. Denies fever. Denies known sick contacts.       Review of Systems     @Review of Systems   Constitutional:  Negative for activity change, appetite change, fatigue and fever.   HENT:  Positive for nasal congestion, rhinorrhea, sinus pressure/congestion and sore throat. Negative for ear pain.    Eyes:  Negative for pain, redness, visual disturbance and eye dryness.   Respiratory:  Positive for cough. Negative for shortness of breath.    Cardiovascular:  Negative for chest pain and leg swelling.   Gastrointestinal:  Negative for abdominal distention, abdominal pain, constipation and diarrhea.   Endocrine: Negative for cold intolerance, heat intolerance and polyuria.   Genitourinary:  Negative for bladder incontinence, dysuria, frequency and urgency.   Musculoskeletal:  Negative for arthralgias, gait problem and myalgias.   Integumentary:  Negative for color change, rash and wound.   Allergic/Immunologic: Negative for environmental allergies and food allergies.   Neurological:  Positive for headaches. Negative for dizziness, weakness and light-headedness.    Psychiatric/Behavioral:  Negative for behavioral problems and sleep disturbance.      Medical / Social / Family History     Past Medical History:   Diagnosis Date    Arthritis     Family history of breast cancer in sister 11/14/2022    Hyperlipidemia     Hypertension     Left hip pain     Osteoporosis     Venous insufficiency, peripheral        Past Surgical History:   Procedure Laterality Date    EXCISION OF BREAST LESION Right 1970    KNEE ARTHROSCOPY Right 2020    low back disc surgery  2014    in [place]    TUBAL LIGATION      VAGINAL DELIVERY  1980    VAGINAL DELIVERY  1975    VENOUS ABLATION Right 09/18/2020    Anterior Shin Varithena Ablation performed by DR. Cesar Dumont.    VENOUS ABLATION Left 09/11/2020    Lower Anterior Shin Varithen Ablation performed by Dr. Cesar Dumont.       Social History  Ms.  reports that she has never smoked. She has never been exposed to tobacco smoke. She has never used smokeless tobacco. She reports current alcohol use. She reports that she does not use drugs.    Family History  Ms.'s family history includes Breast cancer in her sister; Cancer in her maternal grandmother; Colon cancer in her brother and sister; Colon polyps in her sister; Diabetes in her father; Heart disease in her maternal grandfather; Kidney disease in her father; Stroke in her mother; Sudden death in her maternal grandfather.    Medications and Allergies     Medications  Outpatient Medications Marked as Taking for the 5/22/23 encounter (Office Visit) with Nikki Bridges NP   Medication Sig Dispense Refill    aspirin (ECOTRIN) 81 MG EC tablet Take 81 mg by mouth once daily.      BEPREVE 1.5 % Drop Place 1 drop into both eyes 2 (two) times daily.      calcium carb and citrate-vitD3 600 mg calcium- 500 unit TbSR Take 1 tablet by mouth once daily.      cyclobenzaprine (FLEXERIL) 10 MG tablet Take 1 tablet (10 mg total) by mouth every evening. 90 tablet 1    denosumab (PROLIA) 60 mg/mL Syrg Inject 60 mg  into the skin every 6 (six) months.      diclofenac sodium (VOLTAREN) 1 % Gel Apply 2 g topically once daily. 3 each 1    docusate sodium (COLACE) 100 MG capsule 1 capsule as needed.      ergocalciferol (ERGOCALCIFEROL) 50,000 unit Cap Take 1 capsule (50,000 Units total) by mouth every 7 days. 12 capsule 1    fluticasone propionate (FLOVENT DISKUS) 50 mcg/actuation DsDv Inhale 1 spray into the lungs 2 (two) times a day. Controller 3 each 1    gabapentin (NEURONTIN) 300 MG capsule TAKE 1 CAPSULE BY MOUTH EVERY EVENING. 30 capsule 3    garlic 1,000 mg Cap Take 1 capsule by mouth once daily.      hydrOXYzine pamoate (VISTARIL) 25 MG Cap Take 1 capsule (25 mg total) by mouth 2 (two) times daily as needed (itching). 180 capsule 1    loratadine (CLARITIN) 10 mg tablet Take 1 tablet (10 mg total) by mouth once daily. 90 tablet 1    meloxicam (MOBIC) 15 MG tablet Take 1 tablet (15 mg total) by mouth once daily. (Patient taking differently: Take 15 mg by mouth daily as needed.) 90 tablet 1    olmesartan-hydrochlorothiazide (BENICAR HCT) 20-12.5 mg per tablet TAKE 1 TABLET BY MOUTH DAILY. 90 tablet 1    oxybutynin (DITROPAN) 5 MG Tab TAKE 1 TABLET BY MOUTH THREE TIMES DAILY 90 tablet 3    rosuvastatin (CRESTOR) 10 MG tablet Take 1 tablet (10 mg total) by mouth once daily. 90 tablet 3    tiZANidine (ZANAFLEX) 4 MG tablet TAKE 1 TABLET BY MOUTH EVERY 6 HOURS AS NEEDED FOR MUSCLE SPASMS 30 tablet 3    traMADoL (ULTRAM) 50 mg tablet Take 1 tablet (50 mg total) by mouth every 12 (twelve) hours as needed for Pain. 12 tablet 0    triamcinolone acetonide 0.1% (KENALOG) 0.1 % cream Apply topically 2 (two) times daily. 28 g 2    vit c-ascorbate Ca-ascorb sod (VITAMIN C) 500 mg/15 mL Liqd        Current Facility-Administered Medications for the 5/22/23 encounter (Office Visit) with Nikki Bridges NP   Medication Dose Route Frequency Provider Last Rate Last Admin    [COMPLETED] cefTRIAXone injection 1 g  1 g Intramuscular 1 time in  Clinic/HOD Nikki Bridges, KRISTIE   1 g at 05/22/23 1431    [COMPLETED] dexAMETHasone injection 4 mg  4 mg Intramuscular 1 time in Clinic/HOD Nikkisylvie Moyeron, KRISTIE   4 mg at 05/22/23 1431       Allergies  Review of patient's allergies indicates:  No Known Allergies    Physical Examination     Vitals:    05/22/23 1332   BP: 138/76   Pulse: 90   Resp: 20   Temp: 97.6 °F (36.4 °C)     Physical Exam  Vitals and nursing note reviewed.   Constitutional:       Appearance: Normal appearance.   HENT:      Head: Normocephalic.      Right Ear: Tympanic membrane normal.      Left Ear: Tympanic membrane normal.      Nose: Congestion and rhinorrhea present. Rhinorrhea is purulent.      Right Turbinates: Pale.      Left Turbinates: Pale.      Right Sinus: Maxillary sinus tenderness and frontal sinus tenderness present.      Left Sinus: Maxillary sinus tenderness and frontal sinus tenderness present.      Mouth/Throat:      Lips: Pink.      Mouth: Mucous membranes are moist.      Pharynx: Oropharyngeal exudate (clear post nasal drainage.) and posterior oropharyngeal erythema present.   Eyes:      Conjunctiva/sclera: Conjunctivae normal.   Cardiovascular:      Rate and Rhythm: Normal rate and regular rhythm.      Pulses: Normal pulses.      Heart sounds: Normal heart sounds.   Pulmonary:      Effort: Pulmonary effort is normal.      Breath sounds: Normal breath sounds. No wheezing or rhonchi.   Abdominal:      General: Abdomen is flat. Bowel sounds are normal. There is no distension.      Palpations: Abdomen is soft.      Tenderness: There is no abdominal tenderness.   Musculoskeletal:         General: Normal range of motion.      Cervical back: Normal range of motion.   Skin:     General: Skin is warm and dry.      Capillary Refill: Capillary refill takes less than 2 seconds.   Neurological:      General: No focal deficit present.      Mental Status: She is alert and oriented to person, place, and time. Mental status is at baseline.    Psychiatric:         Mood and Affect: Mood normal.         Behavior: Behavior normal.             Lab Results   Component Value Date    WBC 4.89 12/12/2022    HGB 13.4 12/12/2022    HCT 51.2 (H) 12/12/2022    .3 (H) 12/12/2022     12/12/2022        CMP  Sodium   Date Value Ref Range Status   01/25/2023 139 136 - 145 mmol/L Final     Potassium   Date Value Ref Range Status   01/25/2023 4.3 3.5 - 5.1 mmol/L Final     Chloride   Date Value Ref Range Status   01/25/2023 104 98 - 107 mmol/L Final     CO2   Date Value Ref Range Status   01/25/2023 31 21 - 32 mmol/L Final     Glucose   Date Value Ref Range Status   01/25/2023 93 74 - 106 mg/dL Final     BUN   Date Value Ref Range Status   01/25/2023 19 (H) 7 - 18 mg/dL Final     Creatinine   Date Value Ref Range Status   01/25/2023 1.04 (H) 0.55 - 1.02 mg/dL Final     Calcium   Date Value Ref Range Status   01/25/2023 9.8 8.5 - 10.1 mg/dL Final     Total Protein   Date Value Ref Range Status   01/25/2023 7.5 6.4 - 8.2 g/dL Final     Albumin   Date Value Ref Range Status   01/25/2023 3.4 (L) 3.5 - 5.0 g/dL Final     Bilirubin, Total   Date Value Ref Range Status   01/25/2023 0.4 >0.0 - 1.2 mg/dL Final     Alk Phos   Date Value Ref Range Status   01/25/2023 61 55 - 142 U/L Final     AST   Date Value Ref Range Status   01/25/2023 23 15 - 37 U/L Final     ALT   Date Value Ref Range Status   01/25/2023 35 13 - 56 U/L Final     Anion Gap   Date Value Ref Range Status   01/25/2023 8 7 - 16 mmol/L Final     eGFR   Date Value Ref Range Status   01/25/2023 57 (L) >=60 mL/min/1.73m² Final     Procedures   Assessment and Plan (including Health Maintenance)   :    Plan:           Problem List Items Addressed This Visit          ENT    Acute non-recurrent pansinusitis - Primary    Current Assessment & Plan     Rocephin and Decadron given IM in clinic.   Zithromax as ordered and Mucinex DM as needed.     Reviewed pathology of current symptoms, medication side  effects/risk/benefits/directions on taking medications, and worsening or persistent symptoms that require follow up in next 2-3 days. Saline/steroid nasal sprays, antihistamine use, increase fluid intake, and multivitamin/elderberry/Zinc use were recommended. May take Tylenol or Motrin as needed for pain and/or fever. Patient was instructed to take antibiotic as directed, complete entire course to avoid antibiotic resistance, and take OTC probiotic with antibiotic to prevent GI upset. Patient verbalized understanding of treatment plan and denies any questions.           Relevant Medications    cefTRIAXone injection 1 g (Completed)    dexAMETHasone injection 4 mg (Completed)    azithromycin (Z-ALEJANDRA) 250 MG tablet    dextromethorphan-guaiFENesin (MUCINEX DM) 60-1,200 mg per 12 hr tablet    Other Relevant Orders    POCT rapid strep A       Health Maintenance Topics with due status: Not Due       Topic Last Completion Date    Colorectal Cancer Screening 10/13/2021    Mammogram 11/14/2022    Lipid Panel 01/25/2023    DEXA Scan 02/02/2023       Future Appointments   Date Time Provider Department Center   9/5/2023  9:30 AM [facility]        Health Maintenance Due   Topic Date Due    TETANUS VACCINE  Never done    Shingles Vaccine (2 of 3) 12/10/2013        No follow-ups on file.     Signature:  Nikki Bridges NP  [address] [place]  [address]    Date of encounter: 5/22/23

## 2023-05-25 PROBLEM — G25.81 RESTLESS LEGS: Status: ACTIVE | Noted: 2023-05-25

## 2023-05-25 PROBLEM — N32.81 OVERACTIVE BLADDER: Status: ACTIVE | Noted: 2023-05-25

## 2023-05-31 RX ORDER — HYDROXYZINE PAMOATE 25 MG/1
CAPSULE ORAL
Qty: 180 CAPSULE | Refills: 1 | Status: SHIPPED | OUTPATIENT
Start: 2023-05-31

## 2023-06-14 ENCOUNTER — OFFICE VISIT (OUTPATIENT)
Dept: FAMILY MEDICINE | Facility: CLINIC | Age: 73
End: 2023-06-14
Payer: COMMERCIAL

## 2023-06-14 VITALS
DIASTOLIC BLOOD PRESSURE: 78 MMHG | WEIGHT: 179.19 LBS | HEIGHT: 65 IN | BODY MASS INDEX: 29.85 KG/M2 | RESPIRATION RATE: 20 BRPM | HEART RATE: 68 BPM | TEMPERATURE: 98 F | OXYGEN SATURATION: 95 % | SYSTOLIC BLOOD PRESSURE: 152 MMHG

## 2023-06-14 DIAGNOSIS — M17.11 OSTEOARTHRITIS OF RIGHT KNEE, UNSPECIFIED OSTEOARTHRITIS TYPE: Primary | ICD-10-CM

## 2023-06-14 PROCEDURE — 96372 PR INJECTION,THERAP/PROPH/DIAG2ST, IM OR SUBCUT: ICD-10-PCS | Mod: ,,, | Performed by: NURSE PRACTITIONER

## 2023-06-14 PROCEDURE — 3078F DIAST BP <80 MM HG: CPT | Mod: ,,, | Performed by: NURSE PRACTITIONER

## 2023-06-14 PROCEDURE — 99213 OFFICE O/P EST LOW 20 MIN: CPT | Mod: 25,,, | Performed by: NURSE PRACTITIONER

## 2023-06-14 PROCEDURE — 3077F PR MOST RECENT SYSTOLIC BLOOD PRESSURE >= 140 MM HG: ICD-10-PCS | Mod: ,,, | Performed by: NURSE PRACTITIONER

## 2023-06-14 PROCEDURE — 1101F PT FALLS ASSESS-DOCD LE1/YR: CPT | Mod: ,,, | Performed by: NURSE PRACTITIONER

## 2023-06-14 PROCEDURE — 3288F FALL RISK ASSESSMENT DOCD: CPT | Mod: ,,, | Performed by: NURSE PRACTITIONER

## 2023-06-14 PROCEDURE — 1160F PR REVIEW ALL MEDS BY PRESCRIBER/CLIN PHARMACIST DOCUMENTED: ICD-10-PCS | Mod: ,,, | Performed by: NURSE PRACTITIONER

## 2023-06-14 PROCEDURE — 96372 THER/PROPH/DIAG INJ SC/IM: CPT | Mod: ,,, | Performed by: NURSE PRACTITIONER

## 2023-06-14 PROCEDURE — 1160F RVW MEDS BY RX/DR IN RCRD: CPT | Mod: ,,, | Performed by: NURSE PRACTITIONER

## 2023-06-14 PROCEDURE — 1159F MED LIST DOCD IN RCRD: CPT | Mod: ,,, | Performed by: NURSE PRACTITIONER

## 2023-06-14 PROCEDURE — 99213 PR OFFICE/OUTPT VISIT, EST, LEVL III, 20-29 MIN: ICD-10-PCS | Mod: 25,,, | Performed by: NURSE PRACTITIONER

## 2023-06-14 PROCEDURE — 1101F PR PT FALLS ASSESS DOC 0-1 FALLS W/OUT INJ PAST YR: ICD-10-PCS | Mod: ,,, | Performed by: NURSE PRACTITIONER

## 2023-06-14 PROCEDURE — 3288F PR FALLS RISK ASSESSMENT DOCUMENTED: ICD-10-PCS | Mod: ,,, | Performed by: NURSE PRACTITIONER

## 2023-06-14 PROCEDURE — 1159F PR MEDICATION LIST DOCUMENTED IN MEDICAL RECORD: ICD-10-PCS | Mod: ,,, | Performed by: NURSE PRACTITIONER

## 2023-06-14 PROCEDURE — 3077F SYST BP >= 140 MM HG: CPT | Mod: ,,, | Performed by: NURSE PRACTITIONER

## 2023-06-14 PROCEDURE — 3078F PR MOST RECENT DIASTOLIC BLOOD PRESSURE < 80 MM HG: ICD-10-PCS | Mod: ,,, | Performed by: NURSE PRACTITIONER

## 2023-06-14 RX ORDER — KETOROLAC TROMETHAMINE 30 MG/ML
30 INJECTION, SOLUTION INTRAMUSCULAR; INTRAVENOUS
Status: COMPLETED | OUTPATIENT
Start: 2023-06-14 | End: 2023-06-14

## 2023-06-14 RX ORDER — METHYLPREDNISOLONE ACETATE 40 MG/ML
40 INJECTION, SUSPENSION INTRA-ARTICULAR; INTRALESIONAL; INTRAMUSCULAR; SOFT TISSUE
Status: DISCONTINUED | OUTPATIENT
Start: 2023-06-14 | End: 2023-06-14

## 2023-06-14 RX ORDER — MELOXICAM 15 MG/1
15 TABLET ORAL DAILY
Qty: 90 TABLET | Refills: 1 | Status: SHIPPED | OUTPATIENT
Start: 2023-06-14 | End: 2024-03-11 | Stop reason: SDUPTHER

## 2023-06-14 RX ORDER — DEXAMETHASONE SODIUM PHOSPHATE 4 MG/ML
4 INJECTION, SOLUTION INTRA-ARTICULAR; INTRALESIONAL; INTRAMUSCULAR; INTRAVENOUS; SOFT TISSUE
Status: DISCONTINUED | OUTPATIENT
Start: 2023-06-14 | End: 2023-06-14

## 2023-06-14 RX ORDER — IBUPROFEN 800 MG/1
800 TABLET ORAL EVERY 12 HOURS PRN
COMMUNITY
Start: 2023-05-27 | End: 2024-03-11 | Stop reason: SDUPTHER

## 2023-06-14 RX ADMIN — KETOROLAC TROMETHAMINE 30 MG: 30 INJECTION, SOLUTION INTRAMUSCULAR; INTRAVENOUS at 03:06

## 2023-06-14 NOTE — PROGRESS NOTES
Nikki Bridges NP   [place]  [place]  [address]      PATIENT NAME: Delmis Shell  : 1950  DATE: 23  MRN: [id number]      Billing Provider: Nikki Bridges NP  Level of Service: [place] OFFICE/OUTPT VISIT, EST, LEVL III, 20-29 MIN  Patient PCP Information       Provider PCP Type    Nikki Bridges NP General            Reason for Visit / Chief Complaint: Leg Pain (Right leg pain x 4 weeks states she cant sleep it is so painful)         History of Present Illness / Problem Focused Workflow     Delmis Shell presents to the clinic with Leg Pain (Right leg pain x 4 weeks states she cant sleep it is so painful)     73 year old female presents to clinic with complaints of pain to right leg just behind knee. States this started about 3 weeks ago- she went to Urgent Care clinic and was given an injection of steroids. She states pain improved after injection but now has come back.       Review of Systems     @Review of Systems   Constitutional:  Negative for activity change, appetite change, fatigue and fever.   HENT:  Negative for nasal congestion, ear pain, rhinorrhea, sinus pressure/congestion and sore throat.    Eyes:  Negative for pain, redness, visual disturbance and eye dryness.   Respiratory:  Negative for cough and shortness of breath.    Cardiovascular:  Negative for chest pain and leg swelling.   Gastrointestinal:  Negative for abdominal distention, abdominal pain, constipation and diarrhea.   Endocrine: Negative for cold intolerance, heat intolerance and polyuria.   Genitourinary:  Negative for bladder incontinence, dysuria, frequency and urgency.   Musculoskeletal:  Positive for arthralgias and leg pain. Negative for gait problem and myalgias.   Integumentary:  Negative for color change, rash and wound.   Allergic/Immunologic: Negative for environmental allergies and food allergies.   Neurological:  Negative for dizziness, weakness, light-headedness  and headaches.   Psychiatric/Behavioral:  Negative for behavioral problems and sleep disturbance.      Medical / Social / Family History     Past Medical History:   Diagnosis Date    Arthritis     Family history of breast cancer in sister 11/14/2022    Hyperlipidemia     Hypertension     Left hip pain     Osteoporosis     Venous insufficiency, peripheral        Past Surgical History:   Procedure Laterality Date    EXCISION OF BREAST LESION Right 1970    KNEE ARTHROSCOPY Right 2020    low back disc surgery  2014    in [place]    TUBAL LIGATION      VAGINAL DELIVERY  1980    VAGINAL DELIVERY  1975    VENOUS ABLATION Right 09/18/2020    Anterior Shin Varithena Ablation performed by DR. Cesar Dumont.    VENOUS ABLATION Left 09/11/2020    Lower Anterior Shin Varithen Ablation performed by Dr. Cesar Dumont.       Social History  Ms.  reports that she has never smoked. She has never been exposed to tobacco smoke. She has never used smokeless tobacco. She reports current alcohol use. She reports that she does not use drugs.    Family History  Ms.'s family history includes Breast cancer in her sister; Cancer in her maternal grandmother; Colon cancer in her brother and sister; Colon polyps in her sister; Diabetes in her father; Heart disease in her maternal grandfather; Kidney disease in her father; Stroke in her mother; Sudden death in her maternal grandfather.    Medications and Allergies     Medications  Outpatient Medications Marked as Taking for the 6/14/23 encounter (Office Visit) with Nikki Bridges NP   Medication Sig Dispense Refill    aspirin (ECOTRIN) 81 MG EC tablet Take 81 mg by mouth once daily.      BEPREVE 1.5 % Drop Place 1 drop into both eyes 2 (two) times daily.      calcium carb and citrate-vitD3 600 mg calcium- 500 unit TbSR Take 1 tablet by mouth once daily.      cyclobenzaprine (FLEXERIL) 10 MG tablet Take 1 tablet (10 mg total) by mouth every evening. 90 tablet 1    denosumab (PROLIA) 60 mg/mL Syrg  Inject 60 mg into the skin every 6 (six) months.      diclofenac sodium (VOLTAREN) 1 % Gel Apply 2 g topically once daily. 3 each 1    docusate sodium (COLACE) 100 MG capsule 1 capsule as needed.      ergocalciferol (ERGOCALCIFEROL) 50,000 unit Cap Take 1 capsule (50,000 Units total) by mouth every 7 days. 12 capsule 1    fluticasone propionate (FLOVENT DISKUS) 50 mcg/actuation DsDv Inhale 1 spray into the lungs 2 (two) times a day. Controller 3 each 1    gabapentin (NEURONTIN) 300 MG capsule TAKE 1 CAPSULE BY MOUTH EVERY EVENING. 30 capsule 3    garlic 1,000 mg Cap Take 1 capsule by mouth once daily.      hydrOXYzine pamoate (VISTARIL) 25 MG Cap TAKE 1 CAPSULE BY MOUTH 2 TIMES A DAY AS NEEDED FOR ITCHING 180 capsule 1    ibuprofen (ADVIL,MOTRIN) 800 MG tablet Take 800 mg by mouth every 12 (twelve) hours as needed.      loratadine (CLARITIN) 10 mg tablet Take 1 tablet (10 mg total) by mouth once daily. 90 tablet 1    olmesartan-hydrochlorothiazide (BENICAR HCT) 20-12.5 mg per tablet TAKE 1 TABLET BY MOUTH DAILY. 90 tablet 1    olopatadine (PATANOL) 0.1 % ophthalmic solution Place 1 drop into both eyes 2 (two) times daily.      oxybutynin (DITROPAN) 5 MG Tab TAKE 1 TABLET BY MOUTH THREE TIMES DAILY 90 tablet 3    rosuvastatin (CRESTOR) 10 MG tablet Take 1 tablet (10 mg total) by mouth once daily. 90 tablet 3    tiZANidine (ZANAFLEX) 4 MG tablet TAKE 1 TABLET BY MOUTH EVERY 6 HOURS AS NEEDED FOR MUSCLE SPASMS 30 tablet 3    traMADoL (ULTRAM) 50 mg tablet Take 1 tablet (50 mg total) by mouth every 12 (twelve) hours as needed for Pain. 12 tablet 0    triamcinolone acetonide 0.1% (KENALOG) 0.1 % cream Apply topically 2 (two) times daily. 28 g 2    vit c-ascorbate Ca-ascorb sod (VITAMIN C) 500 mg/15 mL Liqd       [DISCONTINUED] meloxicam (MOBIC) 15 MG tablet Take 1 tablet (15 mg total) by mouth once daily. (Patient taking differently: Take 15 mg by mouth daily as needed.) 90 tablet 1     Current Facility-Administered  Medications for the 6/14/23 encounter (Office Visit) with Nikki Bridges NP   Medication Dose Route Frequency Provider Last Rate Last Admin    [COMPLETED] ketorolac injection 30 mg  30 mg Intramuscular 1 time in Clinic/HOD Nikki Bridges NP   30 mg at 06/14/23 1526       Allergies  Review of patient's allergies indicates:  No Known Allergies    Physical Examination     Vitals:    06/14/23 1340   BP: (!) 152/78   Pulse: 68   Resp: 20   Temp: 97.7 °F (36.5 °C)     Physical Exam  Vitals and nursing note reviewed.   HENT:      Head: Normocephalic.      Right Ear: Tympanic membrane normal.      Left Ear: Tympanic membrane normal.      Nose: Nose normal.      Mouth/Throat:      Mouth: Mucous membranes are moist.      Pharynx: Oropharynx is clear. No posterior oropharyngeal erythema.   Eyes:      Conjunctiva/sclera: Conjunctivae normal.   Cardiovascular:      Rate and Rhythm: Normal rate and regular rhythm.      Pulses: Normal pulses.      Heart sounds: Normal heart sounds.   Pulmonary:      Effort: Pulmonary effort is normal.      Breath sounds: Normal breath sounds.   Abdominal:      General: Abdomen is flat. Bowel sounds are normal. There is no distension.      Palpations: Abdomen is soft.   Musculoskeletal:         General: Tenderness present. No swelling.      Cervical back: Normal range of motion.      Right knee: Decreased range of motion. Tenderness present.      Right lower leg: No edema.      Left lower leg: No edema.      Comments: Tenderness behind right knee   Skin:     General: Skin is warm and dry.      Capillary Refill: Capillary refill takes less than 2 seconds.   Neurological:      Mental Status: She is alert. Mental status is at baseline.   Psychiatric:         Mood and Affect: Mood normal.         Behavior: Behavior normal.             Lab Results   Component Value Date    WBC 4.89 12/12/2022    HGB 13.4 12/12/2022    HCT 51.2 (H) 12/12/2022    .3 (H) 12/12/2022     12/12/2022         CMP  Sodium   Date Value Ref Range Status   01/25/2023 139 136 - 145 mmol/L Final     Potassium   Date Value Ref Range Status   01/25/2023 4.3 3.5 - 5.1 mmol/L Final     Chloride   Date Value Ref Range Status   01/25/2023 104 98 - 107 mmol/L Final     CO2   Date Value Ref Range Status   01/25/2023 31 21 - 32 mmol/L Final     Glucose   Date Value Ref Range Status   01/25/2023 93 74 - 106 mg/dL Final     BUN   Date Value Ref Range Status   01/25/2023 19 (H) 7 - 18 mg/dL Final     Creatinine   Date Value Ref Range Status   01/25/2023 1.04 (H) 0.55 - 1.02 mg/dL Final     Calcium   Date Value Ref Range Status   01/25/2023 9.8 8.5 - 10.1 mg/dL Final     Total Protein   Date Value Ref Range Status   01/25/2023 7.5 6.4 - 8.2 g/dL Final     Albumin   Date Value Ref Range Status   01/25/2023 3.4 (L) 3.5 - 5.0 g/dL Final     Bilirubin, Total   Date Value Ref Range Status   01/25/2023 0.4 >0.0 - 1.2 mg/dL Final     Alk Phos   Date Value Ref Range Status   01/25/2023 61 55 - 142 U/L Final     AST   Date Value Ref Range Status   01/25/2023 23 15 - 37 U/L Final     ALT   Date Value Ref Range Status   01/25/2023 35 13 - 56 U/L Final     Anion Gap   Date Value Ref Range Status   01/25/2023 8 7 - 16 mmol/L Final     eGFR   Date Value Ref Range Status   01/25/2023 57 (L) >=60 mL/min/1.73m² Final     Procedures   Assessment and Plan (including Health Maintenance)   :    Plan:           Problem List Items Addressed This Visit          Orthopedic    Osteoarthritis of right knee - Primary    Current Assessment & Plan     Xray showed mild tricompartmental degenerative change. Will give Toradol IM today as patient received Steroid injection a couple weeks ago. Continue Mobic as ordered. She is requesting referral to Dr Torres, ortho.          Relevant Medications    meloxicam (MOBIC) 15 MG tablet    ketorolac injection 30 mg (Completed)    Other Relevant Orders    X-Ray Knee 1 or 2 View Right (Completed)    Ambulatory referral/consult to  Orthopedics       Health Maintenance Topics with due status: Not Due       Topic Last Completion Date    Colorectal Cancer Screening 10/13/2021    Mammogram 11/14/2022    Lipid Panel 01/25/2023    DEXA Scan 02/02/2023       Future Appointments   Date Time Provider Department Center   9/5/2023  9:30 AM [facility], [facility] Sebast[facility] DUNN        Health Maintenance Due   Topic Date Due    TETANUS VACCINE  Never done    Shingles Vaccine (2 of 3) 12/10/2013    COVID-19 Vaccine (6 - Pfizer series) 05/31/2023        No follow-ups on file.     Signature:  Nikki Bridges NP  [facility]  [address]  [address]    Date of encounter: 6/14/23

## 2023-06-15 PROBLEM — M17.11 OSTEOARTHRITIS OF RIGHT KNEE: Status: ACTIVE | Noted: 2023-06-15

## 2023-06-15 NOTE — ASSESSMENT & PLAN NOTE
Xray showed mild tricompartmental degenerative change. Will give Toradol IM today as patient received Steroid injection a couple weeks ago. Continue Mobic as ordered. She is requesting referral to gurinder Anaya.

## 2023-06-22 ENCOUNTER — OFFICE VISIT (OUTPATIENT)
Dept: FAMILY MEDICINE | Facility: CLINIC | Age: 73
End: 2023-06-22
Payer: COMMERCIAL

## 2023-06-22 VITALS
BODY MASS INDEX: 29.76 KG/M2 | HEART RATE: 96 BPM | OXYGEN SATURATION: 96 % | TEMPERATURE: 98 F | RESPIRATION RATE: 17 BRPM | SYSTOLIC BLOOD PRESSURE: 130 MMHG | WEIGHT: 178.63 LBS | DIASTOLIC BLOOD PRESSURE: 72 MMHG | HEIGHT: 65 IN

## 2023-06-22 DIAGNOSIS — J01.40 ACUTE NON-RECURRENT PANSINUSITIS: Primary | ICD-10-CM

## 2023-06-22 PROCEDURE — 1159F PR MEDICATION LIST DOCUMENTED IN MEDICAL RECORD: ICD-10-PCS | Mod: ,,, | Performed by: NURSE PRACTITIONER

## 2023-06-22 PROCEDURE — 1126F PR PAIN SEVERITY QUANTIFIED, NO PAIN PRESENT: ICD-10-PCS | Mod: ,,, | Performed by: NURSE PRACTITIONER

## 2023-06-22 PROCEDURE — 1126F AMNT PAIN NOTED NONE PRSNT: CPT | Mod: ,,, | Performed by: NURSE PRACTITIONER

## 2023-06-22 PROCEDURE — 1160F PR REVIEW ALL MEDS BY PRESCRIBER/CLIN PHARMACIST DOCUMENTED: ICD-10-PCS | Mod: ,,, | Performed by: NURSE PRACTITIONER

## 2023-06-22 PROCEDURE — 99213 PR OFFICE/OUTPT VISIT, EST, LEVL III, 20-29 MIN: ICD-10-PCS | Mod: ,,, | Performed by: NURSE PRACTITIONER

## 2023-06-22 PROCEDURE — 3075F PR MOST RECENT SYSTOLIC BLOOD PRESS GE 130-139MM HG: ICD-10-PCS | Mod: ,,, | Performed by: NURSE PRACTITIONER

## 2023-06-22 PROCEDURE — 99213 OFFICE O/P EST LOW 20 MIN: CPT | Mod: ,,, | Performed by: NURSE PRACTITIONER

## 2023-06-22 PROCEDURE — 3078F PR MOST RECENT DIASTOLIC BLOOD PRESSURE < 80 MM HG: ICD-10-PCS | Mod: ,,, | Performed by: NURSE PRACTITIONER

## 2023-06-22 PROCEDURE — 3075F SYST BP GE 130 - 139MM HG: CPT | Mod: ,,, | Performed by: NURSE PRACTITIONER

## 2023-06-22 PROCEDURE — 3288F PR FALLS RISK ASSESSMENT DOCUMENTED: ICD-10-PCS | Mod: ,,, | Performed by: NURSE PRACTITIONER

## 2023-06-22 PROCEDURE — 1159F MED LIST DOCD IN RCRD: CPT | Mod: ,,, | Performed by: NURSE PRACTITIONER

## 2023-06-22 PROCEDURE — 1160F RVW MEDS BY RX/DR IN RCRD: CPT | Mod: ,,, | Performed by: NURSE PRACTITIONER

## 2023-06-22 PROCEDURE — 3078F DIAST BP <80 MM HG: CPT | Mod: ,,, | Performed by: NURSE PRACTITIONER

## 2023-06-22 PROCEDURE — 3288F FALL RISK ASSESSMENT DOCD: CPT | Mod: ,,, | Performed by: NURSE PRACTITIONER

## 2023-06-22 PROCEDURE — 1101F PT FALLS ASSESS-DOCD LE1/YR: CPT | Mod: ,,, | Performed by: NURSE PRACTITIONER

## 2023-06-22 PROCEDURE — 1101F PR PT FALLS ASSESS DOC 0-1 FALLS W/OUT INJ PAST YR: ICD-10-PCS | Mod: ,,, | Performed by: NURSE PRACTITIONER

## 2023-06-22 RX ORDER — AZITHROMYCIN 250 MG/1
TABLET, FILM COATED ORAL
Qty: 6 TABLET | Refills: 0 | Status: SHIPPED | OUTPATIENT
Start: 2023-06-22 | End: 2023-06-27

## 2023-06-22 NOTE — PROGRESS NOTES
Nikki Bridges NP   [facility]  [address]  [place]  [address]      PATIENT NAME: Delmis Shell  : 1950  DATE: 23  MRN: [id number]      Billing Provider: Nikki Bridges NP  Level of Service: SC OFFICE/OUTPT VISIT, EST, LEVL III, 20-29 MIN  Patient PCP Information       Provider PCP Type    Nikki Bridges NP General            Reason for Visit / Chief Complaint: Ear Fullness (X1 week. Bilateral ear feel full. ) and Gait Problem (States she wobbles while transitioning to stand and walk after sitting for a while. )         History of Present Illness / Problem Focused Workflow     Delmis Shell presents to the clinic with Ear Fullness (X1 week. Bilateral ear feel full. ) and Gait Problem (States she wobbles while transitioning to stand and walk after sitting for a while. )     73 year old female presents to clinic with complaints of sinus pressure, congestion, ear fullness, and feeling off balance for 1 week. Denies fever.       Review of Systems     @Review of Systems   Constitutional:  Negative for activity change, appetite change, fatigue and fever.   HENT:  Positive for nasal congestion, ear pain, rhinorrhea, sinus pressure/congestion and sore throat.    Eyes:  Negative for pain, redness, visual disturbance and eye dryness.   Respiratory:  Positive for cough. Negative for shortness of breath.    Cardiovascular:  Negative for chest pain and leg swelling.   Gastrointestinal:  Negative for abdominal distention, abdominal pain, constipation and diarrhea.   Endocrine: Negative for cold intolerance, heat intolerance and polyuria.   Genitourinary:  Negative for bladder incontinence, dysuria, frequency and urgency.   Musculoskeletal:  Negative for arthralgias, gait problem and myalgias.   Integumentary:  Negative for color change, rash and wound.   Allergic/Immunologic: Negative for environmental allergies and food allergies.   Neurological:  Positive for dizziness.  Negative for weakness, light-headedness and headaches.   Psychiatric/Behavioral:  Negative for behavioral problems and sleep disturbance.      Medical / Social / Family History     Past Medical History:   Diagnosis Date    Arthritis     Family history of breast cancer in sister 11/14/2022    Hyperlipidemia     Hypertension     Left hip pain     Osteoporosis     Venous insufficiency, peripheral        Past Surgical History:   Procedure Laterality Date    EXCISION OF BREAST LESION Right 1970    KNEE ARTHROSCOPY Right 2020    low back disc surgery  2014    in [place]    TUBAL LIGATION      VAGINAL DELIVERY  1980    VAGINAL DELIVERY  1975    VENOUS ABLATION Right 09/18/2020    Anterior Shin Varithena Ablation performed by DR. Cesar Dumont.    VENOUS ABLATION Left 09/11/2020    Lower Anterior Shin Varithen Ablation performed by Dr. Cesar Dumont.       Social History  Ms.  reports that she has never smoked. She has never been exposed to tobacco smoke. She has never used smokeless tobacco. She reports that she does not currently use alcohol. She reports that she does not use drugs.    Family History  Ms.'s family history includes Breast cancer in her sister; Cancer in her maternal grandmother; Colon cancer in her brother and sister; Colon polyps in her sister; Diabetes in her father; Heart disease in her maternal grandfather; Kidney disease in her father; Stroke in her mother; Sudden death in her maternal grandfather.    Medications and Allergies     Medications  Outpatient Medications Marked as Taking for the 6/22/23 encounter (Office Visit) with Nikki Bridges NP   Medication Sig Dispense Refill    aspirin (ECOTRIN) 81 MG EC tablet Take 81 mg by mouth once daily.      BEPREVE 1.5 % Drop Place 1 drop into both eyes 2 (two) times daily.      calcium carb and citrate-vitD3 600 mg calcium- 500 unit TbSR Take 1 tablet by mouth once daily.      cyclobenzaprine (FLEXERIL) 10 MG tablet Take 1 tablet (10 mg total) by mouth  every evening. 90 tablet 1    denosumab (PROLIA) 60 mg/mL Syrg Inject 60 mg into the skin every 6 (six) months.      diclofenac sodium (VOLTAREN) 1 % Gel Apply 2 g topically once daily. 3 each 1    docusate sodium (COLACE) 100 MG capsule 1 capsule as needed.      DUREZOL 0.05 % Drop ophthalmic solution       ergocalciferol (ERGOCALCIFEROL) 50,000 unit Cap Take 1 capsule (50,000 Units total) by mouth every 7 days. 12 capsule 1    fluticasone propionate (FLOVENT DISKUS) 50 mcg/actuation DsDv Inhale 1 spray into the lungs 2 (two) times a day. Controller 3 each 1    gabapentin (NEURONTIN) 300 MG capsule TAKE 1 CAPSULE BY MOUTH EVERY EVENING. 30 capsule 3    garlic 1,000 mg Cap Take 1 capsule by mouth once daily.      hydrOXYzine pamoate (VISTARIL) 25 MG Cap TAKE 1 CAPSULE BY MOUTH 2 TIMES A DAY AS NEEDED FOR ITCHING 180 capsule 1    ibuprofen (ADVIL,MOTRIN) 800 MG tablet Take 800 mg by mouth every 12 (twelve) hours as needed.      loratadine (CLARITIN) 10 mg tablet Take 1 tablet (10 mg total) by mouth once daily. 90 tablet 1    lovastatin (MEVACOR) 40 MG tablet TAKE 1 TABLET BY MOUTH EVERY EVENING 90 tablet 0    meloxicam (MOBIC) 15 MG tablet Take 1 tablet (15 mg total) by mouth once daily. 90 tablet 1    olmesartan-hydrochlorothiazide (BENICAR HCT) 20-12.5 mg per tablet TAKE 1 TABLET BY MOUTH DAILY. 90 tablet 1    oxybutynin (DITROPAN) 5 MG Tab TAKE 1 TABLET BY MOUTH THREE TIMES DAILY 90 tablet 3    rosuvastatin (CRESTOR) 10 MG tablet Take 1 tablet (10 mg total) by mouth once daily. 90 tablet 3    tiZANidine (ZANAFLEX) 4 MG tablet TAKE 1 TABLET BY MOUTH EVERY 6 HOURS AS NEEDED FOR MUSCLE SPASMS 30 tablet 3    traMADoL (ULTRAM) 50 mg tablet Take 1 tablet (50 mg total) by mouth every 12 (twelve) hours as needed for Pain. 12 tablet 0    triamcinolone acetonide 0.1% (KENALOG) 0.1 % cream Apply topically 2 (two) times daily. 28 g 2    vit c-ascorbate Ca-ascorb sod (VITAMIN C) 500 mg/15 mL Liqd       [DISCONTINUED]  montelukast (SINGULAIR) 10 mg tablet Take 1 tablet (10 mg total) by mouth every evening. 90 tablet 1       Allergies  Review of patient's allergies indicates:  No Known Allergies    Physical Examination     Vitals:    06/22/23 0832   BP: 130/72   Pulse: 96   Resp: 17   Temp: 98.1 °F (36.7 °C)     Physical Exam  Vitals and nursing note reviewed.   Constitutional:       Appearance: Normal appearance.   HENT:      Head: Normocephalic.      Right Ear: A middle ear effusion is present.      Left Ear: A middle ear effusion is present.      Nose: Congestion and rhinorrhea present. Rhinorrhea is purulent.      Right Turbinates: Pale.      Left Turbinates: Pale.      Right Sinus: Maxillary sinus tenderness and frontal sinus tenderness present.      Left Sinus: Maxillary sinus tenderness and frontal sinus tenderness present.      Mouth/Throat:      Lips: Pink.      Mouth: Mucous membranes are moist.      Pharynx: Oropharyngeal exudate (clear post nasal drainage.) and posterior oropharyngeal erythema present.   Eyes:      Conjunctiva/sclera: Conjunctivae normal.   Cardiovascular:      Rate and Rhythm: Normal rate and regular rhythm.      Pulses: Normal pulses.      Heart sounds: Normal heart sounds.   Pulmonary:      Effort: Pulmonary effort is normal.      Breath sounds: Normal breath sounds. No wheezing or rhonchi.   Abdominal:      General: Abdomen is flat. Bowel sounds are normal. There is no distension.      Palpations: Abdomen is soft.      Tenderness: There is no abdominal tenderness.   Musculoskeletal:         General: Normal range of motion.      Cervical back: Normal range of motion.   Skin:     General: Skin is warm and dry.      Capillary Refill: Capillary refill takes less than 2 seconds.   Neurological:      General: No focal deficit present.      Mental Status: She is alert and oriented to person, place, and time. Mental status is at baseline.   Psychiatric:         Mood and Affect: Mood normal.          Behavior: Behavior normal.             Lab Results   Component Value Date    WBC 4.89 12/12/2022    HGB 13.4 12/12/2022    HCT 51.2 (H) 12/12/2022    .3 (H) 12/12/2022     12/12/2022        CMP  Sodium   Date Value Ref Range Status   01/25/2023 139 136 - 145 mmol/L Final     Potassium   Date Value Ref Range Status   01/25/2023 4.3 3.5 - 5.1 mmol/L Final     Chloride   Date Value Ref Range Status   01/25/2023 104 98 - 107 mmol/L Final     CO2   Date Value Ref Range Status   01/25/2023 31 21 - 32 mmol/L Final     Glucose   Date Value Ref Range Status   01/25/2023 93 74 - 106 mg/dL Final     BUN   Date Value Ref Range Status   01/25/2023 19 (H) 7 - 18 mg/dL Final     Creatinine   Date Value Ref Range Status   01/25/2023 1.04 (H) 0.55 - 1.02 mg/dL Final     Calcium   Date Value Ref Range Status   01/25/2023 9.8 8.5 - 10.1 mg/dL Final     Total Protein   Date Value Ref Range Status   01/25/2023 7.5 6.4 - 8.2 g/dL Final     Albumin   Date Value Ref Range Status   01/25/2023 3.4 (L) 3.5 - 5.0 g/dL Final     Bilirubin, Total   Date Value Ref Range Status   01/25/2023 0.4 >0.0 - 1.2 mg/dL Final     Alk Phos   Date Value Ref Range Status   01/25/2023 61 55 - 142 U/L Final     AST   Date Value Ref Range Status   01/25/2023 23 15 - 37 U/L Final     ALT   Date Value Ref Range Status   01/25/2023 35 13 - 56 U/L Final     Anion Gap   Date Value Ref Range Status   01/25/2023 8 7 - 16 mmol/L Final     eGFR   Date Value Ref Range Status   01/25/2023 57 (L) >=60 mL/min/1.73m² Final     Procedures   Assessment and Plan (including Health Maintenance)   :    Plan:           Problem List Items Addressed This Visit          ENT    Acute non-recurrent pansinusitis - Primary    Current Assessment & Plan     Zithromax as ordered and Ed a hist as needed.    Reviewed pathology of current symptoms, medication side effects/risk/benefits/directions on taking medications, and worsening or persistent symptoms that require follow up in  next 2-3 days. Saline/steroid nasal sprays, antihistamine use, increase fluid intake, and multivitamin/elderberry/Zinc use were recommended. May take Tylenol or Motrin as needed for pain and/or fever. Patient was instructed to take antibiotic as directed, complete entire course to avoid antibiotic resistance, and take OTC probiotic with antibiotic to prevent GI upset. Patient verbalized understanding of treatment plan and denies any questions.            Health Maintenance Topics with due status: Not Due       Topic Last Completion Date    Colorectal Cancer Screening 10/13/2021    Mammogram 11/14/2022    Lipid Panel 01/25/2023    DEXA Scan 02/02/2023       Future Appointments   Date Time Provider Department Center   9/5/2023  9:30 AM INFUSION, [facility]        Health Maintenance Due   Topic Date Due    TETANUS VACCINE  Never done    Shingles Vaccine (2 of 3) 12/10/2013    COVID-19 Vaccine (6 - Pfizer series) 05/31/2023        No follow-ups on file.     Signature:  Nikki Bridges NP  [address]  [address]    Date of encounter: 6/22/23

## 2023-06-28 ENCOUNTER — OFFICE VISIT (OUTPATIENT)
Dept: ORTHOPEDICS | Facility: CLINIC | Age: 73
End: 2023-06-28
Payer: COMMERCIAL

## 2023-06-28 VITALS — WEIGHT: 178 LBS | BODY MASS INDEX: 29.66 KG/M2 | HEIGHT: 65 IN

## 2023-06-28 DIAGNOSIS — M17.11 OSTEOARTHRITIS OF RIGHT KNEE, UNSPECIFIED OSTEOARTHRITIS TYPE: ICD-10-CM

## 2023-06-28 PROCEDURE — 1159F MED LIST DOCD IN RCRD: CPT | Mod: CPTII,,, | Performed by: ORTHOPAEDIC SURGERY

## 2023-06-28 PROCEDURE — 99203 PR OFFICE/OUTPT VISIT, NEW, LEVL III, 30-44 MIN: ICD-10-PCS | Mod: S$PBB,25,, | Performed by: ORTHOPAEDIC SURGERY

## 2023-06-28 PROCEDURE — 20610 DRAIN/INJ JOINT/BURSA W/O US: CPT | Mod: PBBFAC | Performed by: ORTHOPAEDIC SURGERY

## 2023-06-28 PROCEDURE — 1159F PR MEDICATION LIST DOCUMENTED IN MEDICAL RECORD: ICD-10-PCS | Mod: CPTII,,, | Performed by: ORTHOPAEDIC SURGERY

## 2023-06-28 PROCEDURE — 99215 OFFICE O/P EST HI 40 MIN: CPT | Mod: PBBFAC | Performed by: ORTHOPAEDIC SURGERY

## 2023-06-28 PROCEDURE — 20610 LARGE JOINT ASPIRATION/INJECTION: R KNEE: ICD-10-PCS | Mod: S$PBB,RT,, | Performed by: ORTHOPAEDIC SURGERY

## 2023-06-28 PROCEDURE — 99203 OFFICE O/P NEW LOW 30 MIN: CPT | Mod: S$PBB,25,, | Performed by: ORTHOPAEDIC SURGERY

## 2023-06-28 PROCEDURE — 20610 DRAIN/INJ JOINT/BURSA W/O US: CPT | Mod: PBBFAC,RT | Performed by: ORTHOPAEDIC SURGERY

## 2023-06-28 RX ORDER — TRIAMCINOLONE ACETONIDE 40 MG/ML
40 INJECTION, SUSPENSION INTRA-ARTICULAR; INTRAMUSCULAR
Status: DISCONTINUED | OUTPATIENT
Start: 2023-06-28 | End: 2023-06-28 | Stop reason: HOSPADM

## 2023-06-28 RX ORDER — BUPIVACAINE HYDROCHLORIDE 2.5 MG/ML
1 INJECTION, SOLUTION EPIDURAL; INFILTRATION; INTRACAUDAL
Status: DISCONTINUED | OUTPATIENT
Start: 2023-06-28 | End: 2023-06-28 | Stop reason: HOSPADM

## 2023-06-28 RX ADMIN — TRIAMCINOLONE ACETONIDE 40 MG: 40 INJECTION, SUSPENSION INTRA-ARTICULAR; INTRAMUSCULAR at 09:06

## 2023-06-28 RX ADMIN — BUPIVACAINE HYDROCHLORIDE 1 ML: 2.5 INJECTION, SOLUTION EPIDURAL; INFILTRATION; INTRACAUDAL; PERINEURAL at 09:06

## 2023-06-28 NOTE — PROCEDURES
Large Joint Aspiration/Injection: R knee    Date/Time: 6/28/2023 9:40 AM  Performed by: Pablo Torres MD  Authorized by: Pablo Torres MD     Consent Done?:  Yes (Verbal)  Indications:  Arthritis  Prep: patient was prepped and draped in usual sterile fashion      Details:  Needle Size:  22 G  Ultrasonic Guidance for needle placement?: No    Approach:  Anterolateral  Location:  Knee  Site:  R knee  Medications:  1 mL BUPivacaine (PF) 0.25% (2.5 mg/ml) 0.25 % (2.5 mg/mL); 40 mg triamcinolone acetonide 40 mg/mL  Patient tolerance:  Patient tolerated the procedure well with no immediate complications

## 2023-06-28 NOTE — PROGRESS NOTES
Clinic Note        CC:   Chief Complaint   Patient presents with    Right Knee - Pain        Principal problem: No primary diagnosis found.     REASON FOR VISIT:       HISTORY:  73-year-old female complaining right knee pain for several months hurts her more on the inside into the posterior aspect of the knee especially she is been on it for long period of time x-rays show she has some degenerative changes of the knee.  Tricompartmental      PAST MEDICAL HISTORY:   Past Medical History:   Diagnosis Date    Arthritis     Family history of breast cancer in sister 11/14/2022    Hyperlipidemia     Hypertension     Left hip pain     Osteoporosis     Venous insufficiency, peripheral           PAST SURGICAL HISTORY:   Past Surgical History:   Procedure Laterality Date    EXCISION OF BREAST LESION Right 1970    KNEE ARTHROSCOPY Right 2020    low back disc surgery  2014    in [place]    TUBAL LIGATION      VAGINAL DELIVERY  1980    VAGINAL DELIVERY  1975    VENOUS ABLATION Right 09/18/2020    Anterior Shin Varithena Ablation performed by DR. Cesar Dumont.    VENOUS ABLATION Left 09/11/2020    Lower Anterior Shin Varithen Ablation performed by Dr. Cesar Dumont.          ALLERGIES: Review of patient's allergies indicates:  No Known Allergies     MEDICATIONS :    Current Outpatient Medications:     aspirin (ECOTRIN) 81 MG EC tablet, Take 81 mg by mouth once daily., Disp: , Rfl:     BEPREVE 1.5 % Drop, Place 1 drop into both eyes 2 (two) times daily., Disp: , Rfl:     calcium carb and citrate-vitD3 600 mg calcium- 500 unit TbSR, Take 1 tablet by mouth once daily., Disp: , Rfl:     chlorpheniramine-phenylephrine 4-10 mg per tablet, Take 1 tablet by mouth every 4 (four) hours as needed for Congestion., Disp: 30 tablet, Rfl: 0    cyclobenzaprine (FLEXERIL) 10 MG tablet, Take 1 tablet (10 mg total) by mouth every evening., Disp: 90 tablet, Rfl: 1    denosumab (PROLIA) 60 mg/mL Syrg, Inject 60 mg into the skin every 6  (six) months., Disp: , Rfl:     diclofenac sodium (VOLTAREN) 1 % Gel, Apply 2 g topically once daily., Disp: 3 each, Rfl: 1    docusate sodium (COLACE) 100 MG capsule, 1 capsule as needed., Disp: , Rfl:     DUREZOL 0.05 % Drop ophthalmic solution, , Disp: , Rfl:     ergocalciferol (ERGOCALCIFEROL) 50,000 unit Cap, Take 1 capsule (50,000 Units total) by mouth every 7 days., Disp: 12 capsule, Rfl: 1    fluticasone propionate (FLOVENT DISKUS) 50 mcg/actuation DsDv, Inhale 1 spray into the lungs 2 (two) times a day. Controller, Disp: 3 each, Rfl: 1    gabapentin (NEURONTIN) 300 MG capsule, TAKE 1 CAPSULE BY MOUTH EVERY EVENING., Disp: 30 capsule, Rfl: 3    garlic 1,000 mg Cap, Take 1 capsule by mouth once daily., Disp: , Rfl:     hydrOXYzine pamoate (VISTARIL) 25 MG Cap, TAKE 1 CAPSULE BY MOUTH 2 TIMES A DAY AS NEEDED FOR ITCHING, Disp: 180 capsule, Rfl: 1    ibuprofen (ADVIL,MOTRIN) 800 MG tablet, Take 800 mg by mouth every 12 (twelve) hours as needed., Disp: , Rfl:     loratadine (CLARITIN) 10 mg tablet, Take 1 tablet (10 mg total) by mouth once daily., Disp: 90 tablet, Rfl: 1    lovastatin (MEVACOR) 40 MG tablet, TAKE 1 TABLET BY MOUTH EVERY EVENING, Disp: 90 tablet, Rfl: 0    meloxicam (MOBIC) 15 MG tablet, Take 1 tablet (15 mg total) by mouth once daily., Disp: 90 tablet, Rfl: 1    olmesartan-hydrochlorothiazide (BENICAR HCT) 20-12.5 mg per tablet, TAKE 1 TABLET BY MOUTH DAILY., Disp: 90 tablet, Rfl: 1    olopatadine (PATANOL) 0.1 % ophthalmic solution, Place 1 drop into both eyes 2 (two) times daily., Disp: , Rfl:     oxybutynin (DITROPAN) 5 MG Tab, TAKE 1 TABLET BY MOUTH THREE TIMES DAILY, Disp: 90 tablet, Rfl: 3    rosuvastatin (CRESTOR) 10 MG tablet, Take 1 tablet (10 mg total) by mouth once daily., Disp: 90 tablet, Rfl: 3    tiZANidine (ZANAFLEX) 4 MG tablet, TAKE 1 TABLET BY MOUTH EVERY 6 HOURS AS NEEDED FOR MUSCLE SPASMS, Disp: 30 tablet, Rfl: 3    traMADoL (ULTRAM) 50 mg tablet, Take 1 tablet (50 mg total)  by mouth every 12 (twelve) hours as needed for Pain., Disp: 12 tablet, Rfl: 0    triamcinolone acetonide 0.1% (KENALOG) 0.1 % cream, Apply topically 2 (two) times daily., Disp: 28 g, Rfl: 2    vit c-ascorbate Ca-ascorb sod (VITAMIN C) 500 mg/15 mL Liqd, , Disp: , Rfl:      SOCIAL HISTORY:   Social History     Socioeconomic History    Marital status:    Tobacco Use    Smoking status: Never     Passive exposure: Never    Smokeless tobacco: Never   Substance and Sexual Activity    Alcohol use: Not Currently    Drug use: Never    Sexual activity: Not Currently     Partners: Male     Birth control/protection: See Surgical Hx          FAMILY HISTORY:   Family History   Problem Relation Age of Onset    Stroke Mother     Kidney disease Father         polycystic kidney disease    Diabetes Father     Breast cancer Sister     Colon cancer Brother     Sudden death Maternal Grandfather     Heart disease Maternal Grandfather     Cancer Maternal Grandmother     Colon cancer Sister     Colon polyps Sister           PHYSICAL EXAM:  In general, this is a well-developed, well-nourished female . The patient is alert, oriented and cooperative.      HEENT:  Normocephalic, atraumatic.  Extraocular movements are intact bilaterally.  The oropharynx is benign.       NECK:  Nontender with good range of motion.      LUNGS:  Clear to auscultation bilaterally.      HEART:  Demonstrates a regular rate and rhythm.  No murmurs appreciated.      ABDOMEN:  Soft, non-tender, non-distended.        EXTREMITIES:  Right lower extremity she moves her toes has sensation to touch has palpable pulses she is tender to palpation over the medial and posterior aspect of the knee some crepitus on motion she comes to near full extension but lacks few degrees of extension no instability on varus valgus stress.      RADIOGRAPHIC FINDINGS:  X-rays show some degenerative changes with periarticular osteophytes thinning of the articular cartilage.      IMPRESSION:  Osteoarthritis of right knee, unspecified osteoarthritis type  -     Ambulatory referral/consult to Orthopedics         PLAN:  At this time we discussed treatment options.  I injected her knee 1 cc Marcaine 1 cc Kenalog.  Let her weightbear as tolerates.  I will follow up 3 months.Delmis Katelynn Shell presents today for knee pain from ploa.  rightKnee prepped sterile technique and injected with 1cc marcaine snd 1cc kenalog.  Tolerated procedure well. Verbal consent obtained    There are no Patient Instructions on file for this visit.      Follow up in about 3 months (around 9/28/2023).         Pablo Torres      (Subject to voice recognition error, transcription service not allowed)

## 2023-07-17 DIAGNOSIS — E55.9 VITAMIN D DEFICIENCY: ICD-10-CM

## 2023-07-17 RX ORDER — ERGOCALCIFEROL 1.25 MG/1
CAPSULE ORAL
Qty: 12 CAPSULE | Refills: 1 | Status: SHIPPED | OUTPATIENT
Start: 2023-07-17 | End: 2024-03-11 | Stop reason: SDUPTHER

## 2023-07-17 RX ORDER — GABAPENTIN 300 MG/1
CAPSULE ORAL
Qty: 30 CAPSULE | Refills: 3 | Status: SHIPPED | OUTPATIENT
Start: 2023-07-17 | End: 2023-12-11 | Stop reason: SDUPTHER

## 2023-07-17 RX ORDER — OXYBUTYNIN CHLORIDE 5 MG/1
TABLET ORAL
Qty: 90 TABLET | Refills: 3 | Status: SHIPPED | OUTPATIENT
Start: 2023-07-17 | End: 2024-03-11 | Stop reason: SDUPTHER

## 2023-08-11 ENCOUNTER — OFFICE VISIT (OUTPATIENT)
Dept: FAMILY MEDICINE | Facility: CLINIC | Age: 73
End: 2023-08-11
Payer: COMMERCIAL

## 2023-08-11 VITALS
HEIGHT: 65 IN | DIASTOLIC BLOOD PRESSURE: 64 MMHG | OXYGEN SATURATION: 98 % | WEIGHT: 175.19 LBS | SYSTOLIC BLOOD PRESSURE: 102 MMHG | RESPIRATION RATE: 18 BRPM | BODY MASS INDEX: 29.19 KG/M2 | TEMPERATURE: 97 F | HEART RATE: 66 BPM

## 2023-08-11 DIAGNOSIS — M54.16 LUMBAR RADICULOPATHY: Primary | ICD-10-CM

## 2023-08-11 PROCEDURE — 96372 PR INJECTION,THERAP/PROPH/DIAG2ST, IM OR SUBCUT: ICD-10-PCS | Mod: ,,, | Performed by: NURSE PRACTITIONER

## 2023-08-11 PROCEDURE — 3288F PR FALLS RISK ASSESSMENT DOCUMENTED: ICD-10-PCS | Mod: ,,, | Performed by: NURSE PRACTITIONER

## 2023-08-11 PROCEDURE — 3078F DIAST BP <80 MM HG: CPT | Mod: ,,, | Performed by: NURSE PRACTITIONER

## 2023-08-11 PROCEDURE — 99213 OFFICE O/P EST LOW 20 MIN: CPT | Mod: 25,,, | Performed by: NURSE PRACTITIONER

## 2023-08-11 PROCEDURE — 1159F MED LIST DOCD IN RCRD: CPT | Mod: ,,, | Performed by: NURSE PRACTITIONER

## 2023-08-11 PROCEDURE — 3078F PR MOST RECENT DIASTOLIC BLOOD PRESSURE < 80 MM HG: ICD-10-PCS | Mod: ,,, | Performed by: NURSE PRACTITIONER

## 2023-08-11 PROCEDURE — 96372 THER/PROPH/DIAG INJ SC/IM: CPT | Mod: ,,, | Performed by: NURSE PRACTITIONER

## 2023-08-11 PROCEDURE — 99213 PR OFFICE/OUTPT VISIT, EST, LEVL III, 20-29 MIN: ICD-10-PCS | Mod: 25,,, | Performed by: NURSE PRACTITIONER

## 2023-08-11 PROCEDURE — 3074F SYST BP LT 130 MM HG: CPT | Mod: ,,, | Performed by: NURSE PRACTITIONER

## 2023-08-11 PROCEDURE — 1101F PT FALLS ASSESS-DOCD LE1/YR: CPT | Mod: ,,, | Performed by: NURSE PRACTITIONER

## 2023-08-11 PROCEDURE — 3074F PR MOST RECENT SYSTOLIC BLOOD PRESSURE < 130 MM HG: ICD-10-PCS | Mod: ,,, | Performed by: NURSE PRACTITIONER

## 2023-08-11 PROCEDURE — 1159F PR MEDICATION LIST DOCUMENTED IN MEDICAL RECORD: ICD-10-PCS | Mod: ,,, | Performed by: NURSE PRACTITIONER

## 2023-08-11 PROCEDURE — 3288F FALL RISK ASSESSMENT DOCD: CPT | Mod: ,,, | Performed by: NURSE PRACTITIONER

## 2023-08-11 PROCEDURE — 1101F PR PT FALLS ASSESS DOC 0-1 FALLS W/OUT INJ PAST YR: ICD-10-PCS | Mod: ,,, | Performed by: NURSE PRACTITIONER

## 2023-08-11 RX ORDER — ROSUVASTATIN CALCIUM 10 MG/1
10 TABLET, COATED ORAL DAILY
Qty: 90 TABLET | Refills: 3 | Status: SHIPPED | OUTPATIENT
Start: 2023-08-11 | End: 2024-08-10

## 2023-08-11 RX ORDER — KETOROLAC TROMETHAMINE 10 MG/1
10 TABLET, FILM COATED ORAL EVERY 6 HOURS
Qty: 20 TABLET | Refills: 0 | Status: SHIPPED | OUTPATIENT
Start: 2023-08-11 | End: 2023-08-16

## 2023-08-11 RX ORDER — KETOROLAC TROMETHAMINE 30 MG/ML
30 INJECTION, SOLUTION INTRAMUSCULAR; INTRAVENOUS
Status: COMPLETED | OUTPATIENT
Start: 2023-08-11 | End: 2023-08-11

## 2023-08-11 RX ADMIN — KETOROLAC TROMETHAMINE 30 MG: 30 INJECTION, SOLUTION INTRAMUSCULAR; INTRAVENOUS at 10:08

## 2023-08-11 NOTE — PROGRESS NOTES
Nikki Bridges NP   [facility]  [address]  [place]  [address]      PATIENT NAME: Delmis Shell  : 1950  DATE: 23  MRN: [id number]      Billing Provider: Nikki Bridges NP  Level of Service: LA OFFICE/OUTPT VISIT, EST, LEVL III, 20-29 MIN  Patient PCP Information       Provider PCP Type    Nikki Bridges NP General            Reason for Visit / Chief Complaint: left side pain (Sciatic type pain for last two weeks has been taking tylenol but it is not getting better )         History of Present Illness / Problem Focused Workflow     Delmis Shell presents to the clinic with left side pain (Sciatic type pain for last two weeks has been taking tylenol but it is not getting better )     73 year old female presents to clinic with complaints of lower back pain radiating down left leg. She has seen Dr Johnson in the past at [facility] and was diagnosed with degenerative disc disease and spinal stenosis. States she received spinal injections for a while and it helped this pain. However, she wants to try to treat it conservatively before going back to [facility]. She states she has tried taking Tylenol at home with no improvement.         Review of Systems     @Review of Systems   Constitutional:  Negative for activity change, appetite change, fatigue and fever.   HENT:  Negative for nasal congestion, ear pain, rhinorrhea, sinus pressure/congestion and sore throat.    Eyes:  Negative for pain, redness, visual disturbance and eye dryness.   Respiratory:  Negative for cough and shortness of breath.    Cardiovascular:  Negative for chest pain and leg swelling.   Gastrointestinal:  Negative for abdominal distention, abdominal pain, constipation and diarrhea.   Endocrine: Negative for cold intolerance, heat intolerance and polyuria.   Genitourinary:  Negative for bladder incontinence, dysuria, frequency and urgency.   Musculoskeletal:  Positive for arthralgias and myalgias. Negative for  gait problem.   Integumentary:  Negative for color change, rash and wound.   Allergic/Immunologic: Negative for environmental allergies and food allergies.   Neurological:  Negative for dizziness, weakness, light-headedness and headaches.   Psychiatric/Behavioral:  Negative for behavioral problems and sleep disturbance.        Medical / Social / Family History     Past Medical History:   Diagnosis Date    Arthritis     Family history of breast cancer in sister 11/14/2022    Hyperlipidemia     Hypertension     Left hip pain     Osteoporosis     Venous insufficiency, peripheral        Past Surgical History:   Procedure Laterality Date    EXCISION OF BREAST LESION Right 1970    KNEE ARTHROSCOPY Right 2020    low back disc surgery  2014    in [place]    TUBAL LIGATION      VAGINAL DELIVERY  1980    VAGINAL DELIVERY  1975    VENOUS ABLATION Right 09/18/2020    Anterior Shin Varithena Ablation performed by DR. Cesar Dumont.    VENOUS ABLATION Left 09/11/2020    Lower Anterior Shin Varithen Ablation performed by Dr. Cesar Dumont.       Social History  Ms.  reports that she has never smoked. She has never been exposed to tobacco smoke. She has never used smokeless tobacco. She reports that she does not currently use alcohol. She reports that she does not use drugs.    Family History  Ms.'s family history includes Breast cancer in her sister; Cancer in her maternal grandmother; Colon cancer in her brother and sister; Colon polyps in her sister; Diabetes in her father; Heart disease in her maternal grandfather; Kidney disease in her father; Stroke in her mother; Sudden death in her maternal grandfather.    Medications and Allergies     Medications  Outpatient Medications Marked as Taking for the 8/11/23 encounter (Office Visit) with Nikki Bridges NP   Medication Sig Dispense Refill    aspirin (ECOTRIN) 81 MG EC tablet Take 81 mg by mouth once daily.      BEPREVE 1.5 % Drop Place 1 drop into both eyes 2 (two) times daily.       cyclobenzaprine (FLEXERIL) 10 MG tablet Take 1 tablet (10 mg total) by mouth every evening. 90 tablet 1    denosumab (PROLIA) 60 mg/mL Syrg Inject 60 mg into the skin every 6 (six) months.      diclofenac sodium (VOLTAREN) 1 % Gel Apply 2 g topically once daily. 3 each 1    docusate sodium (COLACE) 100 MG capsule 1 capsule as needed.      DUREZOL 0.05 % Drop ophthalmic solution       ergocalciferol (ERGOCALCIFEROL) 50,000 unit Cap TAKE 1 CAPSULE BY MOUTH ONCE WEEKLY 12 capsule 1    fluticasone propionate (FLOVENT DISKUS) 50 mcg/actuation DsDv Inhale 1 spray into the lungs 2 (two) times a day. Controller 3 each 1    gabapentin (NEURONTIN) 300 MG capsule TAKE 1 CAPSULE BY MOUTH EVERY EVENING. 30 capsule 3    garlic 1,000 mg Cap Take 1 capsule by mouth once daily.      hydrOXYzine pamoate (VISTARIL) 25 MG Cap TAKE 1 CAPSULE BY MOUTH 2 TIMES A DAY AS NEEDED FOR ITCHING 180 capsule 1    loratadine (CLARITIN) 10 mg tablet Take 1 tablet (10 mg total) by mouth once daily. 90 tablet 1    meloxicam (MOBIC) 15 MG tablet Take 1 tablet (15 mg total) by mouth once daily. 90 tablet 1    olmesartan-hydrochlorothiazide (BENICAR HCT) 20-12.5 mg per tablet TAKE 1 TABLET BY MOUTH DAILY. 90 tablet 1    olopatadine (PATANOL) 0.1 % ophthalmic solution Place 1 drop into both eyes 2 (two) times daily.      oxybutynin (DITROPAN) 5 MG Tab TAKE 1 TABLET BY MOUTH THREE TIMES DAILY. 90 tablet 3    tiZANidine (ZANAFLEX) 4 MG tablet TAKE 1 TABLET BY MOUTH EVERY 6 HOURS AS NEEDED FOR MUSCLE SPASMS 30 tablet 3    triamcinolone acetonide 0.1% (KENALOG) 0.1 % cream Apply topically 2 (two) times daily. 28 g 2    vit c-ascorbate Ca-ascorb sod (VITAMIN C) 500 mg/15 mL Liqd       [DISCONTINUED] rosuvastatin (CRESTOR) 10 MG tablet Take 1 tablet (10 mg total) by mouth once daily. 90 tablet 3     Current Facility-Administered Medications for the 8/11/23 encounter (Office Visit) with Nikki Bridges NP   Medication Dose Route Frequency Provider Last  Rate Last Admin    [COMPLETED] ketorolac injection 30 mg  30 mg Intramuscular 1 time in Clinic/HOD Nikki BridgesKRISTIE   30 mg at 08/11/23 1049       Allergies  Review of patient's allergies indicates:  No Known Allergies    Physical Examination     Vitals:    08/11/23 0951   BP: 102/64   Pulse: 66   Resp: 18   Temp: 97.1 °F (36.2 °C)     Physical Exam  Vitals and nursing note reviewed.   HENT:      Head: Normocephalic.      Nose: Nose normal.      Mouth/Throat:      Mouth: Mucous membranes are moist.      Pharynx: Oropharynx is clear.   Eyes:      Conjunctiva/sclera: Conjunctivae normal.   Cardiovascular:      Rate and Rhythm: Normal rate and regular rhythm.      Pulses: Normal pulses.      Heart sounds: Normal heart sounds.   Pulmonary:      Effort: Pulmonary effort is normal.      Breath sounds: Normal breath sounds.   Abdominal:      General: Abdomen is flat. Bowel sounds are normal.      Palpations: Abdomen is soft.   Musculoskeletal:      Cervical back: Normal range of motion.      Lumbar back: Spasms and tenderness present.   Skin:     General: Skin is warm and dry.      Capillary Refill: Capillary refill takes less than 2 seconds.   Neurological:      Mental Status: She is alert. Mental status is at baseline.   Psychiatric:         Mood and Affect: Mood normal.         Behavior: Behavior normal.               Lab Results   Component Value Date    WBC 4.89 12/12/2022    HGB 13.4 12/12/2022    HCT 51.2 (H) 12/12/2022    .3 (H) 12/12/2022     12/12/2022        CMP  Sodium   Date Value Ref Range Status   01/25/2023 139 136 - 145 mmol/L Final     Potassium   Date Value Ref Range Status   01/25/2023 4.3 3.5 - 5.1 mmol/L Final     Chloride   Date Value Ref Range Status   01/25/2023 104 98 - 107 mmol/L Final     CO2   Date Value Ref Range Status   01/25/2023 31 21 - 32 mmol/L Final     Glucose   Date Value Ref Range Status   01/25/2023 93 74 - 106 mg/dL Final     BUN   Date Value Ref Range Status    01/25/2023 19 (H) 7 - 18 mg/dL Final     Creatinine   Date Value Ref Range Status   01/25/2023 1.04 (H) 0.55 - 1.02 mg/dL Final     Calcium   Date Value Ref Range Status   01/25/2023 9.8 8.5 - 10.1 mg/dL Final     Total Protein   Date Value Ref Range Status   01/25/2023 7.5 6.4 - 8.2 g/dL Final     Albumin   Date Value Ref Range Status   01/25/2023 3.4 (L) 3.5 - 5.0 g/dL Final     Bilirubin, Total   Date Value Ref Range Status   01/25/2023 0.4 >0.0 - 1.2 mg/dL Final     Alk Phos   Date Value Ref Range Status   01/25/2023 61 55 - 142 U/L Final     AST   Date Value Ref Range Status   01/25/2023 23 15 - 37 U/L Final     ALT   Date Value Ref Range Status   01/25/2023 35 13 - 56 U/L Final     Anion Gap   Date Value Ref Range Status   01/25/2023 8 7 - 16 mmol/L Final     eGFR   Date Value Ref Range Status   01/25/2023 57 (L) >=60 mL/min/1.73m² Final     Procedures   Assessment and Plan (including Health Maintenance)   :    Plan:           Problem List Items Addressed This Visit          Neuro    Lumbar radiculopathy - Primary    Current Assessment & Plan     Toradol given IM in clinic. Toradol and Flexeril as ordered.   Patient was instructed to rest, take medications as directed, alternate ice/moist heat to area, and monitor for worsening symptoms that require immediate evaluation. Patient was instructed to follow up if symptoms persist past current treatment plan to discuss further options, such as physical therapy, referral to NS or other diagnostic imaging. Medication side effects/risk/benefits/directions on taking medications were reviewed with patient. Patient verbalized understanding of treatment plan and denies any questions.              Health Maintenance Topics with due status: Not Due       Topic Last Completion Date    Colorectal Cancer Screening 10/13/2021    Influenza Vaccine 11/03/2022    Mammogram 11/14/2022    Lipid Panel 01/25/2023    DEXA Scan 02/02/2023       Future Appointments   Date Time Provider  Department Center   9/5/2023  9:30 AM INFUSION, [place] [facility] INFUSN [place]    10/2/2023 10:00 AM Pablo Torres MD [facility]THO [facility]        Health Maintenance Due   Topic Date Due    TETANUS VACCINE  Never done    Shingles Vaccine (2 of 3) 12/10/2013    COVID-19 Vaccine (6 - Pfizer series) 05/31/2023        No follow-ups on file.     Signature:  Nikki Bridges NP  [address]  [address]    Date of encounter: 8/11/23

## 2023-08-11 NOTE — ASSESSMENT & PLAN NOTE
Toradol given IM in clinic. Toradol and Flexeril as ordered.   Patient was instructed to rest, take medications as directed, alternate ice/moist heat to area, and monitor for worsening symptoms that require immediate evaluation. Patient was instructed to follow up if symptoms persist past current treatment plan to discuss further options, such as physical therapy, referral to NS or other diagnostic imaging. Medication side effects/risk/benefits/directions on taking medications were reviewed with patient. Patient verbalized understanding of treatment plan and denies any questions.

## 2023-08-21 PROBLEM — J01.40 ACUTE NON-RECURRENT PANSINUSITIS: Status: RESOLVED | Noted: 2022-10-18 | Resolved: 2023-08-21

## 2023-08-25 ENCOUNTER — OFFICE VISIT (OUTPATIENT)
Dept: FAMILY MEDICINE | Facility: CLINIC | Age: 73
End: 2023-08-25
Payer: COMMERCIAL

## 2023-08-25 VITALS
WEIGHT: 174.81 LBS | TEMPERATURE: 98 F | DIASTOLIC BLOOD PRESSURE: 78 MMHG | SYSTOLIC BLOOD PRESSURE: 116 MMHG | HEIGHT: 65 IN | RESPIRATION RATE: 18 BRPM | BODY MASS INDEX: 29.12 KG/M2 | OXYGEN SATURATION: 98 % | HEART RATE: 80 BPM

## 2023-08-25 DIAGNOSIS — M54.31 SCIATIC PAIN, RIGHT: ICD-10-CM

## 2023-08-25 DIAGNOSIS — M54.16 LUMBAR RADICULOPATHY: ICD-10-CM

## 2023-08-25 DIAGNOSIS — R82.81 PYURIA: Primary | ICD-10-CM

## 2023-08-25 LAB
BILIRUB SERPL-MCNC: NEGATIVE MG/DL
BLOOD URINE, POC: ABNORMAL
COLOR, POC UA: YELLOW
GLUCOSE UR QL STRIP: NEGATIVE
KETONES UR QL STRIP: NEGATIVE
LEUKOCYTE ESTERASE URINE, POC: ABNORMAL
NITRITE, POC UA: NEGATIVE
PH, POC UA: 6.5
PROTEIN, POC: NEGATIVE
SPECIFIC GRAVITY, POC UA: 1.02
UROBILINOGEN, POC UA: 0.2

## 2023-08-25 PROCEDURE — 87086 CULTURE, URINE: ICD-10-PCS | Mod: ,,, | Performed by: CLINICAL MEDICAL LABORATORY

## 2023-08-25 PROCEDURE — 1126F PR PAIN SEVERITY QUANTIFIED, NO PAIN PRESENT: ICD-10-PCS | Mod: ,,, | Performed by: NURSE PRACTITIONER

## 2023-08-25 PROCEDURE — 1159F MED LIST DOCD IN RCRD: CPT | Mod: ,,, | Performed by: NURSE PRACTITIONER

## 2023-08-25 PROCEDURE — 1101F PT FALLS ASSESS-DOCD LE1/YR: CPT | Mod: ,,, | Performed by: NURSE PRACTITIONER

## 2023-08-25 PROCEDURE — 3074F SYST BP LT 130 MM HG: CPT | Mod: ,,, | Performed by: NURSE PRACTITIONER

## 2023-08-25 PROCEDURE — 87186 CULTURE, URINE: ICD-10-PCS | Mod: ,,, | Performed by: CLINICAL MEDICAL LABORATORY

## 2023-08-25 PROCEDURE — 99213 PR OFFICE/OUTPT VISIT, EST, LEVL III, 20-29 MIN: ICD-10-PCS | Mod: ,,, | Performed by: NURSE PRACTITIONER

## 2023-08-25 PROCEDURE — 3078F PR MOST RECENT DIASTOLIC BLOOD PRESSURE < 80 MM HG: ICD-10-PCS | Mod: ,,, | Performed by: NURSE PRACTITIONER

## 2023-08-25 PROCEDURE — 81003 URINALYSIS AUTO W/O SCOPE: CPT | Mod: QW,,, | Performed by: NURSE PRACTITIONER

## 2023-08-25 PROCEDURE — 1159F PR MEDICATION LIST DOCUMENTED IN MEDICAL RECORD: ICD-10-PCS | Mod: ,,, | Performed by: NURSE PRACTITIONER

## 2023-08-25 PROCEDURE — 3074F PR MOST RECENT SYSTOLIC BLOOD PRESSURE < 130 MM HG: ICD-10-PCS | Mod: ,,, | Performed by: NURSE PRACTITIONER

## 2023-08-25 PROCEDURE — 3288F PR FALLS RISK ASSESSMENT DOCUMENTED: ICD-10-PCS | Mod: ,,, | Performed by: NURSE PRACTITIONER

## 2023-08-25 PROCEDURE — 3078F DIAST BP <80 MM HG: CPT | Mod: ,,, | Performed by: NURSE PRACTITIONER

## 2023-08-25 PROCEDURE — 1160F PR REVIEW ALL MEDS BY PRESCRIBER/CLIN PHARMACIST DOCUMENTED: ICD-10-PCS | Mod: ,,, | Performed by: NURSE PRACTITIONER

## 2023-08-25 PROCEDURE — 87077 CULTURE, URINE: ICD-10-PCS | Mod: ,,, | Performed by: CLINICAL MEDICAL LABORATORY

## 2023-08-25 PROCEDURE — 1160F RVW MEDS BY RX/DR IN RCRD: CPT | Mod: ,,, | Performed by: NURSE PRACTITIONER

## 2023-08-25 PROCEDURE — 87077 CULTURE AEROBIC IDENTIFY: CPT | Mod: ,,, | Performed by: CLINICAL MEDICAL LABORATORY

## 2023-08-25 PROCEDURE — 87086 URINE CULTURE/COLONY COUNT: CPT | Mod: ,,, | Performed by: CLINICAL MEDICAL LABORATORY

## 2023-08-25 PROCEDURE — 1126F AMNT PAIN NOTED NONE PRSNT: CPT | Mod: ,,, | Performed by: NURSE PRACTITIONER

## 2023-08-25 PROCEDURE — 87186 SC STD MICRODIL/AGAR DIL: CPT | Mod: ,,, | Performed by: CLINICAL MEDICAL LABORATORY

## 2023-08-25 PROCEDURE — 1101F PR PT FALLS ASSESS DOC 0-1 FALLS W/OUT INJ PAST YR: ICD-10-PCS | Mod: ,,, | Performed by: NURSE PRACTITIONER

## 2023-08-25 PROCEDURE — 99213 OFFICE O/P EST LOW 20 MIN: CPT | Mod: ,,, | Performed by: NURSE PRACTITIONER

## 2023-08-25 PROCEDURE — 81003 POCT URINALYSIS W/O SCOPE: ICD-10-PCS | Mod: QW,,, | Performed by: NURSE PRACTITIONER

## 2023-08-25 PROCEDURE — 3288F FALL RISK ASSESSMENT DOCD: CPT | Mod: ,,, | Performed by: NURSE PRACTITIONER

## 2023-08-25 RX ORDER — NITROFURANTOIN 25; 75 MG/1; MG/1
100 CAPSULE ORAL 2 TIMES DAILY
Qty: 14 CAPSULE | Refills: 0 | Status: SHIPPED | OUTPATIENT
Start: 2023-08-25 | End: 2023-11-30 | Stop reason: ALTCHOICE

## 2023-08-25 RX ORDER — PHENAZOPYRIDINE HYDROCHLORIDE 100 MG/1
100 TABLET, FILM COATED ORAL 3 TIMES DAILY PRN
Qty: 15 TABLET | Refills: 0 | Status: SHIPPED | OUTPATIENT
Start: 2023-08-25 | End: 2023-09-04

## 2023-08-25 NOTE — ASSESSMENT & PLAN NOTE
She is requesting referral back to [facility]. States she has been seen there in past but cannot remember physician's name. She was given injections in her back which helped some. Pain partially relieved with Tylenol.

## 2023-08-25 NOTE — PROGRESS NOTES
Nikki Bridges NP   [facility] [address]  [place]  [address]      PATIENT NAME: Delmis Shell  : 1950  DATE: 23  MRN: [id number]      Billing Provider: Nikki Bridges NP  Level of Service: [place] OFFICE/OUTPT VISIT, EST, LEVL III, 20-29 MIN  Patient PCP Information       Provider PCP Type    Nikki Bridges NP General            Reason for Visit / Chief Complaint: Dysuria (X 2 weeks. ) and Urinary Frequency (X 2 weeks. Reports it was cloudy this morning. )         History of Present Illness / Problem Focused Workflow     Delmis Shell presents to the clinic with Dysuria (X 2 weeks. ) and Urinary Frequency (X 2 weeks. Reports it was cloudy this morning. )     73 year old female presents to clinic with complaints of dysuria and urinary frequency for a couple weeks now. Denies fever. She also reports she continues to have low back pain and pain that is radiating down her right leg. States it got a little better after Toradol injection a few weeks ago but never completely went away.         Review of Systems     @Review of Systems   Constitutional:  Negative for activity change, appetite change, fatigue and fever.   HENT:  Negative for nasal congestion, ear pain, rhinorrhea, sinus pressure/congestion and sore throat.    Eyes:  Negative for pain, redness, visual disturbance and eye dryness.   Respiratory:  Negative for cough and shortness of breath.    Cardiovascular:  Negative for chest pain and leg swelling.   Gastrointestinal:  Negative for abdominal distention, abdominal pain, constipation and diarrhea.   Endocrine: Negative for cold intolerance, heat intolerance and polyuria.   Genitourinary:  Positive for dysuria, frequency and urgency. Negative for bladder incontinence.   Musculoskeletal:  Positive for back pain, leg pain and myalgias. Negative for arthralgias and gait problem.   Integumentary:  Negative for color change, rash and wound.   Allergic/Immunologic:  Negative for environmental allergies and food allergies.   Neurological:  Negative for dizziness, weakness, light-headedness and headaches.   Psychiatric/Behavioral:  Negative for behavioral problems and sleep disturbance.        Medical / Social / Family History     Past Medical History:   Diagnosis Date    Arthritis     Family history of breast cancer in sister 11/14/2022    Hyperlipidemia     Hypertension     Left hip pain     Osteoporosis     Venous insufficiency, peripheral        Past Surgical History:   Procedure Laterality Date    EXCISION OF BREAST LESION Right 1970    KNEE ARTHROSCOPY Right 2020    low back disc surgery  2014    in [place]    TUBAL LIGATION      VAGINAL DELIVERY  1980    VAGINAL DELIVERY  1975    VENOUS ABLATION Right 09/18/2020    Anterior Shin Varithena Ablation performed by DR. Cesar Dumont.    VENOUS ABLATION Left 09/11/2020    Lower Anterior Shin Varithen Ablation performed by Dr. Cesar Dumont.       Social History  Ms.  reports that she has never smoked. She has never been exposed to tobacco smoke. She has never used smokeless tobacco. She reports that she does not currently use alcohol. She reports that she does not use drugs.    Family History  Ms.'s family history includes Breast cancer in her sister; Cancer in her maternal grandmother; Colon cancer in her brother and sister; Colon polyps in her sister; Diabetes in her father; Heart disease in her maternal grandfather; Kidney disease in her father; Stroke in her mother; Sudden death in her maternal grandfather.    Medications and Allergies     Medications  Outpatient Medications Marked as Taking for the 8/25/23 encounter (Office Visit) with Nikki Bridges NP   Medication Sig Dispense Refill    aspirin (ECOTRIN) 81 MG EC tablet Take 81 mg by mouth once daily.      BEPREVE 1.5 % Drop Place 1 drop into both eyes 2 (two) times daily.      cyclobenzaprine (FLEXERIL) 10 MG tablet Take 1 tablet (10 mg total) by mouth every evening.  90 tablet 1    denosumab (PROLIA) 60 mg/mL Syrg Inject 60 mg into the skin every 6 (six) months.      diclofenac sodium (VOLTAREN) 1 % Gel Apply 2 g topically once daily. 3 each 1    docusate sodium (COLACE) 100 MG capsule 1 capsule as needed.      ergocalciferol (ERGOCALCIFEROL) 50,000 unit Cap TAKE 1 CAPSULE BY MOUTH ONCE WEEKLY 12 capsule 1    fluticasone propionate (FLOVENT DISKUS) 50 mcg/actuation DsDv Inhale 1 spray into the lungs 2 (two) times a day. Controller 3 each 1    gabapentin (NEURONTIN) 300 MG capsule TAKE 1 CAPSULE BY MOUTH EVERY EVENING. 30 capsule 3    garlic 1,000 mg Cap Take 1 capsule by mouth once daily.      hydrOXYzine pamoate (VISTARIL) 25 MG Cap TAKE 1 CAPSULE BY MOUTH 2 TIMES A DAY AS NEEDED FOR ITCHING 180 capsule 1    loratadine (CLARITIN) 10 mg tablet Take 1 tablet (10 mg total) by mouth once daily. 90 tablet 1    meloxicam (MOBIC) 15 MG tablet Take 1 tablet (15 mg total) by mouth once daily. 90 tablet 1    olmesartan-hydrochlorothiazide (BENICAR HCT) 20-12.5 mg per tablet TAKE 1 TABLET BY MOUTH DAILY. 90 tablet 1    oxybutynin (DITROPAN) 5 MG Tab TAKE 1 TABLET BY MOUTH THREE TIMES DAILY. 90 tablet 3    rosuvastatin (CRESTOR) 10 MG tablet Take 1 tablet (10 mg total) by mouth once daily. 90 tablet 3    tiZANidine (ZANAFLEX) 4 MG tablet TAKE 1 TABLET BY MOUTH EVERY 6 HOURS AS NEEDED FOR MUSCLE SPASMS 30 tablet 3    vit c-ascorbate Ca-ascorb sod (VITAMIN C) 500 mg/15 mL Liqd          Allergies  Review of patient's allergies indicates:  No Known Allergies    Physical Examination     Vitals:    08/25/23 1407   BP: 116/78   Pulse: 80   Resp: 18   Temp: 97.5 °F (36.4 °C)     Physical Exam  Vitals and nursing note reviewed.   HENT:      Head: Normocephalic.      Nose: Nose normal.      Mouth/Throat:      Mouth: Mucous membranes are moist.      Pharynx: Oropharynx is clear.   Eyes:      Conjunctiva/sclera: Conjunctivae normal.   Cardiovascular:      Rate and Rhythm: Normal rate and regular  rhythm.      Pulses: Normal pulses.      Heart sounds: Normal heart sounds.   Pulmonary:      Effort: Pulmonary effort is normal.      Breath sounds: Normal breath sounds.   Abdominal:      General: Abdomen is flat. Bowel sounds are normal.      Palpations: Abdomen is soft.      Tenderness: There is abdominal tenderness in the suprapubic area.   Musculoskeletal:      Cervical back: Normal range of motion.      Lumbar back: Spasms and tenderness present.   Skin:     General: Skin is warm and dry.      Capillary Refill: Capillary refill takes less than 2 seconds.   Neurological:      Mental Status: She is alert. Mental status is at baseline.   Psychiatric:         Mood and Affect: Mood normal.         Behavior: Behavior normal.               Lab Results   Component Value Date    WBC 4.89 12/12/2022    HGB 13.4 12/12/2022    HCT 51.2 (H) 12/12/2022    .3 (H) 12/12/2022     12/12/2022        CMP  Sodium   Date Value Ref Range Status   01/25/2023 139 136 - 145 mmol/L Final     Potassium   Date Value Ref Range Status   01/25/2023 4.3 3.5 - 5.1 mmol/L Final     Chloride   Date Value Ref Range Status   01/25/2023 104 98 - 107 mmol/L Final     CO2   Date Value Ref Range Status   01/25/2023 31 21 - 32 mmol/L Final     Glucose   Date Value Ref Range Status   01/25/2023 93 74 - 106 mg/dL Final     BUN   Date Value Ref Range Status   01/25/2023 19 (H) 7 - 18 mg/dL Final     Creatinine   Date Value Ref Range Status   01/25/2023 1.04 (H) 0.55 - 1.02 mg/dL Final     Calcium   Date Value Ref Range Status   01/25/2023 9.8 8.5 - 10.1 mg/dL Final     Total Protein   Date Value Ref Range Status   01/25/2023 7.5 6.4 - 8.2 g/dL Final     Albumin   Date Value Ref Range Status   01/25/2023 3.4 (L) 3.5 - 5.0 g/dL Final     Bilirubin, Total   Date Value Ref Range Status   01/25/2023 0.4 >0.0 - 1.2 mg/dL Final     Alk Phos   Date Value Ref Range Status   01/25/2023 61 55 - 142 U/L Final     AST   Date Value Ref Range Status    01/25/2023 23 15 - 37 U/L Final     ALT   Date Value Ref Range Status   01/25/2023 35 13 - 56 U/L Final     Anion Gap   Date Value Ref Range Status   01/25/2023 8 7 - 16 mmol/L Final     eGFR   Date Value Ref Range Status   01/25/2023 57 (L) >=60 mL/min/1.73m² Final     Procedures   Assessment and Plan (including Health Maintenance)   :    Plan:           Problem List Items Addressed This Visit          Neuro    Lumbar radiculopathy    Current Assessment & Plan     She is requesting referral back to [facility]. States she has been seen there in past but cannot remember physician's name. She was given injections in her back which helped some. Pain partially relieved with Tylenol.            Relevant Orders    Ambulatory referral/consult to Neurosurgery       Renal/    Pyuria - Primary    Current Assessment & Plan     UA showed WBC and Blood.   Will culture urine and treat with Macrobid and Pyridium as needed for dysuria. Will follow up with urine culture. Increase fluid. Instructed on always wiping from front to back.          Relevant Medications    nitrofurantoin, macrocrystal-monohydrate, (MACROBID) 100 MG capsule    phenazopyridine (PYRIDIUM) 100 MG tablet    Other Relevant Orders    POCT URINALYSIS W/O SCOPE (Completed)    Urine culture     Other Visit Diagnoses       Sciatic pain, right                Health Maintenance Topics with due status: Not Due       Topic Last Completion Date    Colorectal Cancer Screening 10/13/2021    Influenza Vaccine 11/03/2022    Lipid Panel 01/25/2023    DEXA Scan 02/02/2023       Future Appointments   Date Time Provider Department Center   9/5/2023  9:30 [facility]   10/2/2023 10:00 AM Pablo Torres MD OB ORTHO [facility]        Health Maintenance Due   Topic Date Due    TETANUS VACCINE  Never done    Shingles Vaccine (2 of 3) 12/10/2013    COVID-19 Vaccine (6 - Pfizer series) 05/31/2023    Mammogram  11/14/2023        No follow-ups on  file.     Signature:  Nikki Bridges NP  [address]  [place]  [address]    Date of encounter: 8/25/23

## 2023-08-25 NOTE — ASSESSMENT & PLAN NOTE
UA showed WBC and Blood.   Will culture urine and treat with Macrobid and Pyridium as needed for dysuria. Will follow up with urine culture. Increase fluid. Instructed on always wiping from front to back.

## 2023-08-27 LAB — UA COMPLETE W REFLEX CULTURE PNL UR: ABNORMAL

## 2023-08-27 NOTE — PROGRESS NOTES
Urine culture showed Kelbsiella Pneumoniae which is sensitive to Macrobid which was sent in for patient. Take all medication until complete. Follow up if symptoms persist or worsen.

## 2023-09-09 DIAGNOSIS — Z71.89 COMPLEX CARE COORDINATION: ICD-10-CM

## 2023-09-12 ENCOUNTER — INFUSION (OUTPATIENT)
Dept: INFUSION THERAPY | Facility: HOSPITAL | Age: 73
End: 2023-09-12
Attending: NURSE PRACTITIONER
Payer: COMMERCIAL

## 2023-09-12 VITALS
WEIGHT: 175 LBS | TEMPERATURE: 98 F | BODY MASS INDEX: 29.12 KG/M2 | RESPIRATION RATE: 18 BRPM | DIASTOLIC BLOOD PRESSURE: 77 MMHG | SYSTOLIC BLOOD PRESSURE: 127 MMHG | HEART RATE: 86 BPM | OXYGEN SATURATION: 97 %

## 2023-09-12 DIAGNOSIS — M81.0 OSTEOPOROSIS, UNSPECIFIED OSTEOPOROSIS TYPE, UNSPECIFIED PATHOLOGICAL FRACTURE PRESENCE: Primary | ICD-10-CM

## 2023-09-12 LAB — CALCIUM SERPL-MCNC: 8.3 MG/DL (ref 8.5–10.1)

## 2023-09-12 PROCEDURE — 82310 ASSAY OF CALCIUM: CPT | Performed by: NURSE PRACTITIONER

## 2023-09-26 ENCOUNTER — INFUSION (OUTPATIENT)
Dept: INFUSION THERAPY | Facility: HOSPITAL | Age: 73
End: 2023-09-26
Attending: NURSE PRACTITIONER
Payer: COMMERCIAL

## 2023-09-26 VITALS
HEIGHT: 65 IN | BODY MASS INDEX: 28.99 KG/M2 | RESPIRATION RATE: 18 BRPM | TEMPERATURE: 98 F | SYSTOLIC BLOOD PRESSURE: 104 MMHG | WEIGHT: 174 LBS | OXYGEN SATURATION: 96 % | DIASTOLIC BLOOD PRESSURE: 63 MMHG | HEART RATE: 87 BPM

## 2023-09-26 DIAGNOSIS — M81.0 OSTEOPOROSIS, UNSPECIFIED OSTEOPOROSIS TYPE, UNSPECIFIED PATHOLOGICAL FRACTURE PRESENCE: Primary | ICD-10-CM

## 2023-09-26 LAB — CALCIUM SERPL-MCNC: 8.5 MG/DL (ref 8.5–10.1)

## 2023-09-26 PROCEDURE — 96372 THER/PROPH/DIAG INJ SC/IM: CPT

## 2023-09-26 PROCEDURE — 63600175 PHARM REV CODE 636 W HCPCS: Mod: JZ,TB | Performed by: NURSE PRACTITIONER

## 2023-09-26 RX ADMIN — DENOSUMAB 60 MG: 60 INJECTION SUBCUTANEOUS at 09:09

## 2023-09-26 NOTE — PROGRESS NOTES
0907 Pt in room 1.  Calcium level checked today was 8.5.  Pt stated that she is taking her Calcium with Vitamin D.     0950 Administered Prolia 60mg SQ to right upper arm per protocol.  Pt tolerated well.    1010 No adverse reaction noted.  D/C home, ambulatory with appointment in hand to return in 6 months for next Prolia injection.

## 2023-09-30 ENCOUNTER — EXTERNAL CHRONIC CARE MANAGEMENT (OUTPATIENT)
Dept: FAMILY MEDICINE | Facility: CLINIC | Age: 73
End: 2023-09-30
Payer: COMMERCIAL

## 2023-09-30 PROCEDURE — G0511 CCM/BHI BY RHC/FQHC 20MIN MO: HCPCS | Mod: ,,, | Performed by: NURSE PRACTITIONER

## 2023-09-30 PROCEDURE — G0511 PR CHRONIC CARE MGMT, RHC OR FQHC ONLY, 20 MINS OR MORE: ICD-10-PCS | Mod: ,,, | Performed by: NURSE PRACTITIONER

## 2023-10-13 DIAGNOSIS — J30.9 ALLERGIC RHINITIS, UNSPECIFIED SEASONALITY, UNSPECIFIED TRIGGER: ICD-10-CM

## 2023-10-13 RX ORDER — LORATADINE 10 MG/1
10 TABLET ORAL DAILY
Qty: 90 TABLET | Refills: 1 | Status: SHIPPED | OUTPATIENT
Start: 2023-10-13

## 2023-10-18 ENCOUNTER — OFFICE VISIT (OUTPATIENT)
Dept: ORTHOPEDICS | Facility: CLINIC | Age: 73
End: 2023-10-18
Payer: COMMERCIAL

## 2023-10-18 VITALS — BODY MASS INDEX: 28.99 KG/M2 | HEIGHT: 65 IN | WEIGHT: 174 LBS

## 2023-10-18 DIAGNOSIS — M17.11 OSTEOARTHRITIS OF RIGHT KNEE, UNSPECIFIED OSTEOARTHRITIS TYPE: Primary | ICD-10-CM

## 2023-10-18 DIAGNOSIS — M70.61 TROCHANTERIC BURSITIS OF RIGHT HIP: ICD-10-CM

## 2023-10-18 DIAGNOSIS — I10 HYPERTENSION, UNSPECIFIED TYPE: ICD-10-CM

## 2023-10-18 PROCEDURE — 99999PBSHW PR PBB SHADOW TECHNICAL ONLY FILED TO HB: ICD-10-PCS | Mod: PBBFAC,,,

## 2023-10-18 PROCEDURE — 1159F MED LIST DOCD IN RCRD: CPT | Mod: CPTII,,, | Performed by: ORTHOPAEDIC SURGERY

## 2023-10-18 PROCEDURE — 99213 OFFICE O/P EST LOW 20 MIN: CPT | Mod: S$PBB,25,, | Performed by: ORTHOPAEDIC SURGERY

## 2023-10-18 PROCEDURE — 20610 DRAIN/INJ JOINT/BURSA W/O US: CPT | Mod: PBBFAC | Performed by: ORTHOPAEDIC SURGERY

## 2023-10-18 PROCEDURE — 20610 DRAIN/INJ JOINT/BURSA W/O US: CPT | Mod: PBBFAC,50 | Performed by: ORTHOPAEDIC SURGERY

## 2023-10-18 PROCEDURE — 99214 OFFICE O/P EST MOD 30 MIN: CPT | Mod: PBBFAC | Performed by: ORTHOPAEDIC SURGERY

## 2023-10-18 PROCEDURE — 99999PBSHW PR PBB SHADOW TECHNICAL ONLY FILED TO HB: Mod: PBBFAC,,,

## 2023-10-18 PROCEDURE — 20610 LARGE JOINT ASPIRATION/INJECTION: L GREATER TROCHANTERIC BURSA: ICD-10-PCS | Mod: S$PBB,50,, | Performed by: ORTHOPAEDIC SURGERY

## 2023-10-18 PROCEDURE — 99213 PR OFFICE/OUTPT VISIT, EST, LEVL III, 20-29 MIN: ICD-10-PCS | Mod: S$PBB,25,, | Performed by: ORTHOPAEDIC SURGERY

## 2023-10-18 PROCEDURE — 1159F PR MEDICATION LIST DOCUMENTED IN MEDICAL RECORD: ICD-10-PCS | Mod: CPTII,,, | Performed by: ORTHOPAEDIC SURGERY

## 2023-10-18 RX ORDER — BUPIVACAINE HYDROCHLORIDE 2.5 MG/ML
1 INJECTION, SOLUTION EPIDURAL; INFILTRATION; INTRACAUDAL
Status: DISCONTINUED | OUTPATIENT
Start: 2023-10-18 | End: 2023-10-18 | Stop reason: HOSPADM

## 2023-10-18 RX ORDER — TRIAMCINOLONE ACETONIDE 40 MG/ML
40 INJECTION, SUSPENSION INTRA-ARTICULAR; INTRAMUSCULAR
Status: DISCONTINUED | OUTPATIENT
Start: 2023-10-18 | End: 2023-10-18 | Stop reason: HOSPADM

## 2023-10-18 RX ORDER — OLMESARTAN MEDOXOMIL AND HYDROCHLOROTHIAZIDE 20/12.5 20; 12.5 MG/1; MG/1
TABLET ORAL
Qty: 90 TABLET | Refills: 1 | OUTPATIENT
Start: 2023-10-18

## 2023-10-18 RX ADMIN — BUPIVACAINE HYDROCHLORIDE 1 ML: 2.5 INJECTION, SOLUTION EPIDURAL; INFILTRATION; INTRACAUDAL at 03:10

## 2023-10-18 RX ADMIN — TRIAMCINOLONE ACETONIDE 40 MG: 40 INJECTION, SUSPENSION INTRA-ARTICULAR; INTRAMUSCULAR at 03:10

## 2023-10-18 NOTE — PROCEDURES
Large Joint Aspiration/Injection: L greater trochanteric bursa    Date/Time: 10/18/2023 3:00 PM    Performed by: Pablo Torres MD  Authorized by: Pablo Torres MD    Consent Done?:  Yes (Verbal)  Indications:  Pain    Details:  Needle Size:  22 G  Ultrasonic Guidance for needle placement?: No    Approach:  Lateral  Location:  Hip  Site:  L greater trochanteric bursa  Medications:  1 mL BUPivacaine (PF) 0.25% (2.5 mg/ml) 0.25 % (2.5 mg/mL); 40 mg triamcinolone acetonide 40 mg/mL  Patient tolerance:  Patient tolerated the procedure well with no immediate complications

## 2023-10-18 NOTE — PROCEDURES
Large Joint Aspiration/Injection: R knee    Date/Time: 10/18/2023 3:00 PM    Performed by: Pablo Torres MD  Authorized by: Pablo Torres MD    Consent Done?:  Yes (Verbal)  Indications:  Arthritis    Details:  Needle Size:  22 G  Ultrasonic Guidance for needle placement?: No    Approach:  Anterolateral  Location:  Knee  Site:  R knee  Medications:  1 mL BUPivacaine (PF) 0.25% (2.5 mg/ml) 0.25 % (2.5 mg/mL); 40 mg triamcinolone acetonide 40 mg/mL  Patient tolerance:  Patient tolerated the procedure well with no immediate complications

## 2023-10-18 NOTE — PROGRESS NOTES
Patient is here for follow-up of her right knee arthritic changes.  The shots are helping with the pain.  She wished another injection in her knee.  I injected her right knee 1 cc of Marcaine 1 cc Kenalog.  Her left hip she is having pain on adduction across midline tender over the point of the greater trochanter no instability the hip is noted we discussed treatment options I injected her left hip in her right knee today I will follow back up in 3 months we are going to let her do stretches of left hipDorothy Katelynn Suleman presents today for knee pain from ploa.  rightKnee prepped sterile technique and injected with 1cc marcaine snd 1cc kenalog.  Tolerated procedure well. Verbal consent obtained  Recommendation is for a steroid in injection in the hip. Risks and benefits of the procedure were explained. The patient verbalized understanding and did wish to proceed. After verbal consent was obtained we proceeded with injection. The hip was prepped with alcohol  betadine and the skin was anesthetized with cold spray. The knee was injected with a mixture of 1cc marcaine, and 1cc kenalog. A bandage was applied. The patient tolerated the injection well

## 2023-10-30 ENCOUNTER — OFFICE VISIT (OUTPATIENT)
Dept: FAMILY MEDICINE | Facility: CLINIC | Age: 73
End: 2023-10-30
Payer: COMMERCIAL

## 2023-10-30 VITALS
DIASTOLIC BLOOD PRESSURE: 80 MMHG | BODY MASS INDEX: 29.69 KG/M2 | TEMPERATURE: 98 F | RESPIRATION RATE: 18 BRPM | WEIGHT: 178.19 LBS | SYSTOLIC BLOOD PRESSURE: 136 MMHG | OXYGEN SATURATION: 97 % | HEIGHT: 65 IN | HEART RATE: 94 BPM

## 2023-10-30 DIAGNOSIS — R39.9 UTI SYMPTOMS: Primary | ICD-10-CM

## 2023-10-30 DIAGNOSIS — R82.81 PYURIA: ICD-10-CM

## 2023-10-30 LAB
BILIRUB SERPL-MCNC: NEGATIVE MG/DL
BLOOD URINE, POC: ABNORMAL
COLOR, POC UA: YELLOW
GLUCOSE UR QL STRIP: NEGATIVE
KETONES UR QL STRIP: NEGATIVE
LEUKOCYTE ESTERASE URINE, POC: ABNORMAL
NITRITE, POC UA: NEGATIVE
PH, POC UA: 6
PROTEIN, POC: 100
SPECIFIC GRAVITY, POC UA: 1.02
UROBILINOGEN, POC UA: 1

## 2023-10-30 PROCEDURE — 1159F PR MEDICATION LIST DOCUMENTED IN MEDICAL RECORD: ICD-10-PCS | Mod: ,,, | Performed by: NURSE PRACTITIONER

## 2023-10-30 PROCEDURE — 87086 CULTURE, URINE: ICD-10-PCS | Mod: ,,, | Performed by: CLINICAL MEDICAL LABORATORY

## 2023-10-30 PROCEDURE — 1159F MED LIST DOCD IN RCRD: CPT | Mod: ,,, | Performed by: NURSE PRACTITIONER

## 2023-10-30 PROCEDURE — 87077 CULTURE, URINE: ICD-10-PCS | Mod: ,,, | Performed by: CLINICAL MEDICAL LABORATORY

## 2023-10-30 PROCEDURE — 87077 CULTURE AEROBIC IDENTIFY: CPT | Mod: ,,, | Performed by: CLINICAL MEDICAL LABORATORY

## 2023-10-30 PROCEDURE — 3079F PR MOST RECENT DIASTOLIC BLOOD PRESSURE 80-89 MM HG: ICD-10-PCS | Mod: ,,, | Performed by: NURSE PRACTITIONER

## 2023-10-30 PROCEDURE — 87186 CULTURE, URINE: ICD-10-PCS | Mod: ,,, | Performed by: CLINICAL MEDICAL LABORATORY

## 2023-10-30 PROCEDURE — 87186 SC STD MICRODIL/AGAR DIL: CPT | Mod: ,,, | Performed by: CLINICAL MEDICAL LABORATORY

## 2023-10-30 PROCEDURE — 1101F PR PT FALLS ASSESS DOC 0-1 FALLS W/OUT INJ PAST YR: ICD-10-PCS | Mod: ,,, | Performed by: NURSE PRACTITIONER

## 2023-10-30 PROCEDURE — 81003 POCT URINALYSIS W/O SCOPE: ICD-10-PCS | Mod: QW,,, | Performed by: NURSE PRACTITIONER

## 2023-10-30 PROCEDURE — 3075F SYST BP GE 130 - 139MM HG: CPT | Mod: ,,, | Performed by: NURSE PRACTITIONER

## 2023-10-30 PROCEDURE — 1101F PT FALLS ASSESS-DOCD LE1/YR: CPT | Mod: ,,, | Performed by: NURSE PRACTITIONER

## 2023-10-30 PROCEDURE — 87086 URINE CULTURE/COLONY COUNT: CPT | Mod: ,,, | Performed by: CLINICAL MEDICAL LABORATORY

## 2023-10-30 PROCEDURE — 81003 URINALYSIS AUTO W/O SCOPE: CPT | Mod: QW,,, | Performed by: NURSE PRACTITIONER

## 2023-10-30 PROCEDURE — 3288F PR FALLS RISK ASSESSMENT DOCUMENTED: ICD-10-PCS | Mod: ,,, | Performed by: NURSE PRACTITIONER

## 2023-10-30 PROCEDURE — 99212 OFFICE O/P EST SF 10 MIN: CPT | Mod: ,,, | Performed by: NURSE PRACTITIONER

## 2023-10-30 PROCEDURE — 99212 PR OFFICE/OUTPT VISIT, EST, LEVL II, 10-19 MIN: ICD-10-PCS | Mod: ,,, | Performed by: NURSE PRACTITIONER

## 2023-10-30 PROCEDURE — 3075F PR MOST RECENT SYSTOLIC BLOOD PRESS GE 130-139MM HG: ICD-10-PCS | Mod: ,,, | Performed by: NURSE PRACTITIONER

## 2023-10-30 PROCEDURE — 3079F DIAST BP 80-89 MM HG: CPT | Mod: ,,, | Performed by: NURSE PRACTITIONER

## 2023-10-30 PROCEDURE — 3288F FALL RISK ASSESSMENT DOCD: CPT | Mod: ,,, | Performed by: NURSE PRACTITIONER

## 2023-10-30 RX ORDER — PHENAZOPYRIDINE HYDROCHLORIDE 100 MG/1
100 TABLET, FILM COATED ORAL 3 TIMES DAILY PRN
Qty: 15 TABLET | Refills: 0 | Status: SHIPPED | OUTPATIENT
Start: 2023-10-30 | End: 2023-11-09

## 2023-10-30 RX ORDER — SULFAMETHOXAZOLE AND TRIMETHOPRIM 800; 160 MG/1; MG/1
1 TABLET ORAL 2 TIMES DAILY
Qty: 20 TABLET | Refills: 0 | Status: SHIPPED | OUTPATIENT
Start: 2023-10-30 | End: 2023-11-09

## 2023-10-30 RX ORDER — POTASSIUM CHLORIDE 750 MG/1
10 TABLET, EXTENDED RELEASE ORAL DAILY
COMMUNITY
Start: 2023-09-12

## 2023-10-30 NOTE — ASSESSMENT & PLAN NOTE
UA showed WBC and Blood.   Will culture urine and treat with Bactrim DS and Pyridium as needed for dysuria. Will follow up with urine culture. Increase fluid. Instructed on always wiping from front to back.

## 2023-10-30 NOTE — PROGRESS NOTES
Nikki Bridges NP   [place] [address]  [place]  [address]      PATIENT NAME: Delmis Shell  : 1950  DATE: 10/30/23  MRN: [id number]      Billing Provider: Nikki Bridges NP  Level of Service: [place] OFFICE/OUTPT VISIT, EST, LEVL II, 10-19 MIN  Patient PCP Information       Provider PCP Type    Nikki Bridges NP General            Reason for Visit / Chief Complaint: Urinary Tract Infection (Has been having mild symptoms for past 3 weeks but it got worse )         History of Present Illness / Problem Focused Workflow     Delmis Shell presents to the clinic with Urinary Tract Infection (Has been having mild symptoms for past 3 weeks but it got worse )     73 year old female presents to clinic with complaints of dysuria, urgency, frequency, and lower abd pain x 3 weeks. Denies fever. States she has had UTI in the past and this feels similar.         Review of Systems     @Review of Systems   Constitutional:  Negative for activity change, appetite change, fatigue and fever.   HENT:  Negative for nasal congestion, ear pain, rhinorrhea, sinus pressure/congestion and sore throat.    Eyes:  Negative for pain, redness, visual disturbance and eye dryness.   Respiratory:  Negative for cough and shortness of breath.    Cardiovascular:  Negative for chest pain and leg swelling.   Gastrointestinal:  Positive for abdominal pain. Negative for abdominal distention, constipation and diarrhea.   Endocrine: Negative for cold intolerance, heat intolerance and polyuria.   Genitourinary:  Positive for dysuria, frequency and urgency. Negative for bladder incontinence.   Musculoskeletal:  Negative for arthralgias, gait problem and myalgias.   Integumentary:  Negative for color change, rash and wound.   Allergic/Immunologic: Negative for environmental allergies and food allergies.   Neurological:  Negative for dizziness, weakness, light-headedness and headaches.   Psychiatric/Behavioral:   Negative for behavioral problems and sleep disturbance.        Medical / Social / Family History     Past Medical History:   Diagnosis Date    Arthritis     Family history of breast cancer in sister 11/14/2022    Hyperlipidemia     Hypertension     Left hip pain     Osteoporosis     Venous insufficiency, peripheral        Past Surgical History:   Procedure Laterality Date    EXCISION OF BREAST LESION Right 1970    KNEE ARTHROSCOPY Right 2020    low back disc surgery  2014    in [place]    TUBAL LIGATION      VAGINAL DELIVERY  1980    VAGINAL DELIVERY  1975    VENOUS ABLATION Right 09/18/2020    Anterior Shin Varithena Ablation performed by DR. Cesar Dumont.    VENOUS ABLATION Left 09/11/2020    Lower Anterior Shin Varithen Ablation performed by Dr. Cesar Dumont.       Social History  Ms.  reports that she has never smoked. She has never been exposed to tobacco smoke. She has never used smokeless tobacco. She reports that she does not currently use alcohol. She reports that she does not use drugs.    Family History  Ms.'s family history includes Breast cancer in her sister; Cancer in her maternal grandmother; Colon cancer in her brother and sister; Colon polyps in her sister; Diabetes in her father; Heart disease in her maternal grandfather; Kidney disease in her father; Stroke in her mother; Sudden death in her maternal grandfather.    Medications and Allergies     Medications  Outpatient Medications Marked as Taking for the 10/30/23 encounter (Office Visit) with Nikki Bridges NP   Medication Sig Dispense Refill    aspirin (ECOTRIN) 81 MG EC tablet Take 81 mg by mouth once daily.      BEPREVE 1.5 % Drop Place 1 drop into both eyes 2 (two) times daily.      calcium carb and citrate-vitD3 600 mg calcium- 500 unit TbSR Take 1 tablet by mouth once daily.      cyclobenzaprine (FLEXERIL) 10 MG tablet Take 1 tablet (10 mg total) by mouth every evening. 90 tablet 1    denosumab (PROLIA) 60 mg/mL Syrg Inject 60 mg into  the skin every 6 (six) months.      diclofenac sodium (VOLTAREN) 1 % Gel Apply 2 g topically once daily. 3 each 1    docusate sodium (COLACE) 100 MG capsule 1 capsule as needed.      ergocalciferol (ERGOCALCIFEROL) 50,000 unit Cap TAKE 1 CAPSULE BY MOUTH ONCE WEEKLY 12 capsule 1    fluticasone propionate (FLOVENT DISKUS) 50 mcg/actuation DsDv Inhale 1 spray into the lungs 2 (two) times a day. Controller 3 each 1    gabapentin (NEURONTIN) 300 MG capsule TAKE 1 CAPSULE BY MOUTH EVERY EVENING. 30 capsule 3    garlic 1,000 mg Cap Take 1 capsule by mouth once daily.      hydrOXYzine pamoate (VISTARIL) 25 MG Cap TAKE 1 CAPSULE BY MOUTH 2 TIMES A DAY AS NEEDED FOR ITCHING 180 capsule 1    ibuprofen (ADVIL,MOTRIN) 800 MG tablet Take 800 mg by mouth every 12 (twelve) hours as needed.      loratadine (CLARITIN) 10 mg tablet Take 1 tablet (10 mg total) by mouth once daily. 90 tablet 1    lovastatin (MEVACOR) 40 MG tablet TAKE 1 TABLET BY MOUTH EVERY EVENING 90 tablet 0    meloxicam (MOBIC) 15 MG tablet Take 1 tablet (15 mg total) by mouth once daily. 90 tablet 1    olmesartan-hydrochlorothiazide (BENICAR HCT) 20-12.5 mg per tablet TAKE 1 TABLET BY MOUTH DAILY. 90 tablet 1    olopatadine (PATANOL) 0.1 % ophthalmic solution Place 1 drop into both eyes 2 (two) times daily.      oxybutynin (DITROPAN) 5 MG Tab TAKE 1 TABLET BY MOUTH THREE TIMES DAILY. 90 tablet 3    potassium chloride (KLOR-CON) 10 MEQ TbSR Take 10 mEq by mouth.      tiZANidine (ZANAFLEX) 4 MG tablet TAKE 1 TABLET BY MOUTH EVERY 6 HOURS AS NEEDED FOR MUSCLE SPASMS 30 tablet 3    triamcinolone acetonide 0.1% (KENALOG) 0.1 % cream Apply topically 2 (two) times daily. 28 g 2    vit c-ascorbate Ca-ascorb sod (VITAMIN C) 500 mg/15 mL Liqd          Allergies  Review of patient's allergies indicates:  No Known Allergies    Physical Examination     Vitals:    10/30/23 1331   BP: 136/80   Pulse: 94   Resp: 18   Temp: 97.7 °F (36.5 °C)     Physical Exam  Vitals and nursing  note reviewed.   HENT:      Head: Normocephalic.      Nose: Nose normal.      Mouth/Throat:      Mouth: Mucous membranes are moist.      Pharynx: Oropharynx is clear.   Eyes:      Conjunctiva/sclera: Conjunctivae normal.   Cardiovascular:      Rate and Rhythm: Normal rate and regular rhythm.      Pulses: Normal pulses.      Heart sounds: Normal heart sounds.   Pulmonary:      Effort: Pulmonary effort is normal.   Abdominal:      General: Abdomen is flat. Bowel sounds are normal.      Palpations: Abdomen is soft.      Tenderness: There is abdominal tenderness in the suprapubic area. There is no right CVA tenderness or left CVA tenderness.   Musculoskeletal:         General: Normal range of motion.      Cervical back: Normal range of motion.   Skin:     General: Skin is warm and dry.      Capillary Refill: Capillary refill takes less than 2 seconds.   Neurological:      Mental Status: She is alert. Mental status is at baseline.   Psychiatric:         Mood and Affect: Mood normal.         Behavior: Behavior normal.               Lab Results   Component Value Date    WBC 4.89 12/12/2022    HGB 13.4 12/12/2022    HCT 51.2 (H) 12/12/2022    .3 (H) 12/12/2022     12/12/2022        CMP  Sodium   Date Value Ref Range Status   01/25/2023 139 136 - 145 mmol/L Final     Potassium   Date Value Ref Range Status   01/25/2023 4.3 3.5 - 5.1 mmol/L Final     Chloride   Date Value Ref Range Status   01/25/2023 104 98 - 107 mmol/L Final     CO2   Date Value Ref Range Status   01/25/2023 31 21 - 32 mmol/L Final     Glucose   Date Value Ref Range Status   01/25/2023 93 74 - 106 mg/dL Final     BUN   Date Value Ref Range Status   01/25/2023 19 (H) 7 - 18 mg/dL Final     Creatinine   Date Value Ref Range Status   01/25/2023 1.04 (H) 0.55 - 1.02 mg/dL Final     Calcium   Date Value Ref Range Status   09/26/2023 8.5 8.5 - 10.1 mg/dL Final     Total Protein   Date Value Ref Range Status   01/25/2023 7.5 6.4 - 8.2 g/dL Final      Albumin   Date Value Ref Range Status   01/25/2023 3.4 (L) 3.5 - 5.0 g/dL Final     Bilirubin, Total   Date Value Ref Range Status   01/25/2023 0.4 >0.0 - 1.2 mg/dL Final     Alk Phos   Date Value Ref Range Status   01/25/2023 61 55 - 142 U/L Final     AST   Date Value Ref Range Status   01/25/2023 23 15 - 37 U/L Final     ALT   Date Value Ref Range Status   01/25/2023 35 13 - 56 U/L Final     Anion Gap   Date Value Ref Range Status   01/25/2023 8 7 - 16 mmol/L Final     eGFR   Date Value Ref Range Status   01/25/2023 57 (L) >=60 mL/min/1.73m² Final     Procedures   Assessment and Plan (including Health Maintenance)   :    Plan:           Problem List Items Addressed This Visit          Renal/    Pyuria    Current Assessment & Plan     UA showed WBC and Blood.   Will culture urine and treat with Bactrim DS and Pyridium as needed for dysuria. Will follow up with urine culture. Increase fluid. Instructed on always wiping from front to back.          Relevant Orders    Urine culture     Other Visit Diagnoses       UTI symptoms    -  Primary    Relevant Orders    POCT URINALYSIS W/O SCOPE (Completed)    Urine culture            Health Maintenance Topics with due status: Not Due       Topic Last Completion Date    Colorectal Cancer Screening 10/13/2021    Lipid Panel 01/25/2023    DEXA Scan 02/02/2023       Future Appointments   Date Time Provider Department Center   3/28/2024  9:30 AM INFUSION, [facility] Sebastien DUNN        Health Maintenance Due   Topic Date Due    TETANUS VACCINE  Never done    RSV Vaccine (Age 60+) (1 - 1-dose 60+ series) Never done    Shingles Vaccine (2 of 3) 12/10/2013    Influenza Vaccine (1) 09/01/2023    COVID-19 Vaccine (6 - 2023-24 season) 09/01/2023    Mammogram  11/14/2023        No follow-ups on file.     Signature:  Nikki Bridges NP  [place] [address]  [place]  [address]    Date of encounter: 10/30/23

## 2023-10-31 ENCOUNTER — EXTERNAL CHRONIC CARE MANAGEMENT (OUTPATIENT)
Dept: FAMILY MEDICINE | Facility: CLINIC | Age: 73
End: 2023-10-31
Payer: COMMERCIAL

## 2023-10-31 PROCEDURE — G0511 PR CHRONIC CARE MGMT, RHC OR FQHC ONLY, 20 MINS OR MORE: ICD-10-PCS | Mod: ,,, | Performed by: NURSE PRACTITIONER

## 2023-10-31 PROCEDURE — G0511 CCM/BHI BY RHC/FQHC 20MIN MO: HCPCS | Mod: ,,, | Performed by: NURSE PRACTITIONER

## 2023-11-01 LAB — UA COMPLETE W REFLEX CULTURE PNL UR: ABNORMAL

## 2023-11-01 NOTE — PROGRESS NOTES
Urine culture received and showed Citrobater Farmeri. Sensitive to Bactrim which was prescribed. Continue bactrim as ordered and take until all complete. I recommend she follow up in 1 week after finishing antibiotics and obtain another urine to make sure it has cleared.

## 2023-11-15 DIAGNOSIS — I10 HYPERTENSION, UNSPECIFIED TYPE: ICD-10-CM

## 2023-11-16 RX ORDER — OLMESARTAN MEDOXOMIL AND HYDROCHLOROTHIAZIDE 20/12.5 20; 12.5 MG/1; MG/1
TABLET ORAL
Qty: 90 TABLET | Refills: 0 | Status: SHIPPED | OUTPATIENT
Start: 2023-11-16 | End: 2024-03-11 | Stop reason: SDUPTHER

## 2023-11-16 NOTE — TELEPHONE ENCOUNTER
Patient is requesting refill for Olmesartan-hydrochlorothiazide. Patient had a recent visit on 10/10/23. Her blood pressure reading have been WNL at clinic visits.

## 2023-11-29 LAB
HM MAMMOGRAM: NORMAL
HM MAMMOGRAM: NORMAL

## 2023-11-30 ENCOUNTER — OFFICE VISIT (OUTPATIENT)
Dept: FAMILY MEDICINE | Facility: CLINIC | Age: 73
End: 2023-11-30
Payer: COMMERCIAL

## 2023-11-30 ENCOUNTER — EXTERNAL CHRONIC CARE MANAGEMENT (OUTPATIENT)
Dept: FAMILY MEDICINE | Facility: CLINIC | Age: 73
End: 2023-11-30
Payer: COMMERCIAL

## 2023-11-30 VITALS
OXYGEN SATURATION: 99 % | RESPIRATION RATE: 20 BRPM | HEIGHT: 65 IN | WEIGHT: 177 LBS | TEMPERATURE: 98 F | DIASTOLIC BLOOD PRESSURE: 74 MMHG | BODY MASS INDEX: 29.49 KG/M2 | SYSTOLIC BLOOD PRESSURE: 118 MMHG | HEART RATE: 74 BPM

## 2023-11-30 DIAGNOSIS — M54.41 ACUTE BILATERAL LOW BACK PAIN WITH RIGHT-SIDED SCIATICA: Primary | ICD-10-CM

## 2023-11-30 PROCEDURE — 1101F PR PT FALLS ASSESS DOC 0-1 FALLS W/OUT INJ PAST YR: ICD-10-PCS | Mod: ,,,

## 2023-11-30 PROCEDURE — 1159F MED LIST DOCD IN RCRD: CPT | Mod: ,,,

## 2023-11-30 PROCEDURE — 3078F DIAST BP <80 MM HG: CPT | Mod: ,,,

## 2023-11-30 PROCEDURE — 96372 PR INJECTION,THERAP/PROPH/DIAG2ST, IM OR SUBCUT: ICD-10-PCS | Mod: ,,,

## 2023-11-30 PROCEDURE — 99213 OFFICE O/P EST LOW 20 MIN: CPT | Mod: 25,,,

## 2023-11-30 PROCEDURE — 3074F PR MOST RECENT SYSTOLIC BLOOD PRESSURE < 130 MM HG: ICD-10-PCS | Mod: ,,,

## 2023-11-30 PROCEDURE — 1101F PT FALLS ASSESS-DOCD LE1/YR: CPT | Mod: ,,,

## 2023-11-30 PROCEDURE — 3288F PR FALLS RISK ASSESSMENT DOCUMENTED: ICD-10-PCS | Mod: ,,,

## 2023-11-30 PROCEDURE — 3074F SYST BP LT 130 MM HG: CPT | Mod: ,,,

## 2023-11-30 PROCEDURE — G0511 PR CHRONIC CARE MGMT, RHC OR FQHC ONLY, 20 MINS OR MORE: ICD-10-PCS | Mod: ,,, | Performed by: NURSE PRACTITIONER

## 2023-11-30 PROCEDURE — G0511 CCM/BHI BY RHC/FQHC 20MIN MO: HCPCS | Mod: ,,, | Performed by: NURSE PRACTITIONER

## 2023-11-30 PROCEDURE — 3078F PR MOST RECENT DIASTOLIC BLOOD PRESSURE < 80 MM HG: ICD-10-PCS | Mod: ,,,

## 2023-11-30 PROCEDURE — 99213 PR OFFICE/OUTPT VISIT, EST, LEVL III, 20-29 MIN: ICD-10-PCS | Mod: 25,,,

## 2023-11-30 PROCEDURE — 96372 THER/PROPH/DIAG INJ SC/IM: CPT | Mod: ,,,

## 2023-11-30 PROCEDURE — 1160F PR REVIEW ALL MEDS BY PRESCRIBER/CLIN PHARMACIST DOCUMENTED: ICD-10-PCS | Mod: ,,,

## 2023-11-30 PROCEDURE — 1159F PR MEDICATION LIST DOCUMENTED IN MEDICAL RECORD: ICD-10-PCS | Mod: ,,,

## 2023-11-30 PROCEDURE — 3288F FALL RISK ASSESSMENT DOCD: CPT | Mod: ,,,

## 2023-11-30 PROCEDURE — 1160F RVW MEDS BY RX/DR IN RCRD: CPT | Mod: ,,,

## 2023-11-30 RX ORDER — DEXAMETHASONE SODIUM PHOSPHATE 4 MG/ML
4 INJECTION, SOLUTION INTRA-ARTICULAR; INTRALESIONAL; INTRAMUSCULAR; INTRAVENOUS; SOFT TISSUE
Status: COMPLETED | OUTPATIENT
Start: 2023-11-30 | End: 2023-11-30

## 2023-11-30 RX ORDER — METHYLPREDNISOLONE 4 MG/1
TABLET ORAL
Qty: 21 EACH | Refills: 0 | Status: SHIPPED | OUTPATIENT
Start: 2023-11-30 | End: 2023-12-21

## 2023-11-30 RX ORDER — METHOCARBAMOL 500 MG/1
500 TABLET, FILM COATED ORAL 3 TIMES DAILY
Qty: 30 TABLET | Refills: 0 | Status: SHIPPED | OUTPATIENT
Start: 2023-11-30 | End: 2023-12-11

## 2023-11-30 RX ORDER — KETOROLAC TROMETHAMINE 30 MG/ML
30 INJECTION, SOLUTION INTRAMUSCULAR; INTRAVENOUS
Status: COMPLETED | OUTPATIENT
Start: 2023-11-30 | End: 2023-11-30

## 2023-11-30 RX ADMIN — DEXAMETHASONE SODIUM PHOSPHATE 4 MG: 4 INJECTION, SOLUTION INTRA-ARTICULAR; INTRALESIONAL; INTRAMUSCULAR; INTRAVENOUS; SOFT TISSUE at 04:11

## 2023-11-30 RX ADMIN — KETOROLAC TROMETHAMINE 30 MG: 30 INJECTION, SOLUTION INTRAMUSCULAR; INTRAVENOUS at 04:11

## 2023-11-30 NOTE — PROGRESS NOTES
"   SHARON CUETO   [facility]  [address]  [place]  [address]      PATIENT NAME: Delmis Shell  : 1950  DATE: 23  MRN: [id number]      Billing Provider: SHARON CUETO  Level of Service: [place] OFFICE/OUTPT VISIT, EST, LEVL III, 20-29 MIN  Patient PCP Information       Provider PCP Type    Nikki Bridges NP General            Reason for Visit / Chief Complaint: Leg Problem (Patient states she has numbness and tingling to left leg x 5 days. She also states she has "muscle spasms" sometimes in the evening. )         History of Present Illness / Problem Focused Workflow     Delmis Shell presents to the clinic with Leg Problem (Patient states she has numbness and tingling to left leg x 5 days. She also states she has "muscle spasms" sometimes in the evening. )     74 y/o female presents to clinic with complaints of left leg pain, spasms, and left lower back pain. Pt does complain of some numbness and tingling in left leg. Pt states this is a chronic problem and she does have flare ups. She denies any known injury. Rates pain 6/10    Review of Systems     @Review of Systems   Constitutional:  Negative for chills, fatigue and fever.   HENT:  Negative for nasal congestion, ear pain, sinus pressure/congestion and sore throat.    Respiratory:  Negative for cough, chest tightness, shortness of breath and wheezing.    Cardiovascular:  Negative for chest pain and palpitations.   Gastrointestinal:  Negative for abdominal pain, nausea and vomiting.   Musculoskeletal:  Positive for back pain and leg pain. Negative for myalgias.   Neurological:  Negative for dizziness, weakness, light-headedness and headaches.   Psychiatric/Behavioral:  Negative for suicidal ideas.        Medical / Social / Family History     Past Medical History:   Diagnosis Date    Arthritis     Cataract     Family history of breast cancer in sister 2022    Hyperlipidemia     Hypertension     Left " hip pain     Osteoporosis     Overactive bladder     Venous insufficiency, peripheral     Vitamin D deficiency        Past Surgical History:   Procedure Laterality Date    CATARACT EXTRACTION W/ INTRAOCULAR LENS  IMPLANT, BILATERAL Bilateral     EXCISION OF BREAST LESION Right 1970    KNEE ARTHROSCOPY Right 2020    low back disc surgery  2014    in [place]    TUBAL LIGATION      VAGINAL DELIVERY  1980    VAGINAL DELIVERY  1975    VENOUS ABLATION Right 09/18/2020    Anterior Shin Varithena Ablation performed by DR. Cesar Dumont.    VENOUS ABLATION Left 09/11/2020    Lower Anterior Shin Varithen Ablation performed by Dr. Cesar Dumont.       Social History  Ms.  reports that she has never smoked. She has never been exposed to tobacco smoke. She has never used smokeless tobacco. She reports that she does not currently use alcohol. She reports that she does not use drugs.    Family History  Ms.'s family history includes Breast cancer in her sister; Cancer in her maternal grandmother; Colon cancer in her brother and sister; Colon polyps in her sister; Diabetes in her father; Heart disease in her maternal grandfather; Kidney disease in her father; Stroke in her mother; Sudden death in her maternal grandfather.    Medications and Allergies     Medications  Outpatient Medications Marked as Taking for the 11/30/23 encounter (Office Visit) with Steven Larson FNP   Medication Sig Dispense Refill    aspirin (ECOTRIN) 81 MG EC tablet Take 81 mg by mouth once daily.      calcium carb and citrate-vitD3 600 mg calcium- 500 unit TbSR Take 1 tablet by mouth twice a week.      denosumab (PROLIA) 60 mg/mL Syrg Inject 60 mg into the skin every 6 (six) months.      diclofenac sodium (VOLTAREN) 1 % Gel Apply 2 g topically once daily. 3 each 1    docusate sodium (COLACE) 100 MG capsule Take 1 capsule by mouth once daily.      ergocalciferol (ERGOCALCIFEROL) 50,000 unit Cap TAKE 1 CAPSULE BY MOUTH ONCE WEEKLY 12 capsule 1    gabapentin  (NEURONTIN) 300 MG capsule TAKE 1 CAPSULE BY MOUTH EVERY EVENING. 30 capsule 3    garlic 1,000 mg Cap Take 1 capsule by mouth once daily.      hydrOXYzine pamoate (VISTARIL) 25 MG Cap TAKE 1 CAPSULE BY MOUTH 2 TIMES A DAY AS NEEDED FOR ITCHING 180 capsule 1    ibuprofen (ADVIL,MOTRIN) 800 MG tablet Take 800 mg by mouth every 12 (twelve) hours as needed.      loratadine (CLARITIN) 10 mg tablet Take 1 tablet (10 mg total) by mouth once daily. 90 tablet 1    meloxicam (MOBIC) 15 MG tablet Take 1 tablet (15 mg total) by mouth once daily. 90 tablet 1    olmesartan-hydrochlorothiazide (BENICAR HCT) 20-12.5 mg per tablet TAKE 1 TABLET BY MOUTH DAILY. 90 tablet 0    olopatadine (PATANOL) 0.1 % ophthalmic solution Place 1 drop into both eyes 2 (two) times daily.      oxybutynin (DITROPAN) 5 MG Tab TAKE 1 TABLET BY MOUTH THREE TIMES DAILY. 90 tablet 3    potassium chloride (KLOR-CON) 10 MEQ TbSR Take 10 mEq by mouth once daily.      rosuvastatin (CRESTOR) 10 MG tablet Take 1 tablet (10 mg total) by mouth once daily. 90 tablet 3    tiZANidine (ZANAFLEX) 4 MG tablet TAKE 1 TABLET BY MOUTH EVERY 6 HOURS AS NEEDED FOR MUSCLE SPASMS 30 tablet 3    traMADoL (ULTRAM) 50 mg tablet Take 1 tablet (50 mg total) by mouth every 12 (twelve) hours as needed for Pain. 12 tablet 0    triamcinolone acetonide 0.1% (KENALOG) 0.1 % cream Apply topically 2 (two) times daily. 28 g 2     Current Facility-Administered Medications for the 11/30/23 encounter (Office Visit) with Steven Larson FNP   Medication Dose Route Frequency Provider Last Rate Last Admin    [COMPLETED] dexAMETHasone injection 4 mg  4 mg Intramuscular 1 time in Clinic/HOD Steven Larson FNP   4 mg at 11/30/23 1645    [COMPLETED] ketorolac injection 30 mg  30 mg Intramuscular 1 time in Clinic/HOD Steven Larson FNP   30 mg at 11/30/23 1645       Allergies  Review of patient's allergies indicates:  No Known Allergies    Physical Examination     Vitals:    11/30/23 1606   BP:  118/74   Pulse: 74   Resp: 20   Temp: 97.8 °F (36.6 °C)     Physical Exam  Vitals and nursing note reviewed.   Constitutional:       General: She is awake.      Appearance: Normal appearance.   HENT:      Head: Normocephalic.      Right Ear: Tympanic membrane and ear canal normal.      Left Ear: Tympanic membrane and ear canal normal.      Nose: Nose normal.      Mouth/Throat:      Lips: Pink.      Mouth: Mucous membranes are moist.      Pharynx: Oropharynx is clear. Uvula midline.   Eyes:      Conjunctiva/sclera: Conjunctivae normal.      Pupils: Pupils are equal, round, and reactive to light.   Cardiovascular:      Rate and Rhythm: Normal rate and regular rhythm.      Heart sounds: Normal heart sounds, S1 normal and S2 normal.   Pulmonary:      Effort: No respiratory distress.      Breath sounds: Normal breath sounds. No decreased breath sounds, wheezing, rhonchi or rales.   Abdominal:      General: Bowel sounds are normal.      Palpations: Abdomen is soft.      Tenderness: There is no abdominal tenderness.   Musculoskeletal:      Cervical back: Normal range of motion.      Comments: Strength 5/5 in bilateral lower extremities with full ROM. Negative bilateral straight leg test   Skin:     General: Skin is warm.      Capillary Refill: Capillary refill takes less than 2 seconds.   Neurological:      Mental Status: She is alert and oriented to person, place, and time.   Psychiatric:         Attention and Perception: Attention normal.         Mood and Affect: Mood normal.         Speech: Speech normal.         Behavior: Behavior normal. Behavior is cooperative.         Thought Content: Thought content does not include homicidal or suicidal ideation. Thought content does not include homicidal or suicidal plan.               Lab Results   Component Value Date    WBC 4.89 12/12/2022    HGB 13.4 12/12/2022    HCT 51.2 (H) 12/12/2022    .3 (H) 12/12/2022     12/12/2022        CMP  Sodium   Date Value Ref  Range Status   01/25/2023 139 136 - 145 mmol/L Final     Potassium   Date Value Ref Range Status   01/25/2023 4.3 3.5 - 5.1 mmol/L Final     Chloride   Date Value Ref Range Status   01/25/2023 104 98 - 107 mmol/L Final     CO2   Date Value Ref Range Status   01/25/2023 31 21 - 32 mmol/L Final     Glucose   Date Value Ref Range Status   01/25/2023 93 74 - 106 mg/dL Final     BUN   Date Value Ref Range Status   01/25/2023 19 (H) 7 - 18 mg/dL Final     Creatinine   Date Value Ref Range Status   01/25/2023 1.04 (H) 0.55 - 1.02 mg/dL Final     Calcium   Date Value Ref Range Status   09/26/2023 8.5 8.5 - 10.1 mg/dL Final     Total Protein   Date Value Ref Range Status   01/25/2023 7.5 6.4 - 8.2 g/dL Final     Albumin   Date Value Ref Range Status   01/25/2023 3.4 (L) 3.5 - 5.0 g/dL Final     Bilirubin, Total   Date Value Ref Range Status   01/25/2023 0.4 >0.0 - 1.2 mg/dL Final     Alk Phos   Date Value Ref Range Status   01/25/2023 61 55 - 142 U/L Final     AST   Date Value Ref Range Status   01/25/2023 23 15 - 37 U/L Final     ALT   Date Value Ref Range Status   01/25/2023 35 13 - 56 U/L Final     Anion Gap   Date Value Ref Range Status   01/25/2023 8 7 - 16 mmol/L Final     eGFR   Date Value Ref Range Status   01/25/2023 57 (L) >=60 mL/min/1.73m² Final     Procedures   Assessment and Plan (including Health Maintenance)   :    Plan:           Problem List Items Addressed This Visit          Orthopedic    Acute bilateral low back pain with right-sided sciatica - Primary    Current Assessment & Plan     Toradol IM and Decadron IM today. Robaxin and Medrol Dose pack Rx today. Medication instruction and education given with understanding voiced. Rest. RTC with worsening, new or persistent symptoms.         Relevant Medications    ketorolac injection 30 mg (Completed)    dexAMETHasone injection 4 mg (Completed)    methocarbamoL (ROBAXIN) 500 MG Tab    methylPREDNISolone (MEDROL DOSEPACK) 4 mg tablet       Health Maintenance  Topics with due status: Not Due       Topic Last Completion Date    Colorectal Cancer Screening 10/13/2021    Lipid Panel 01/25/2023    DEXA Scan 02/02/2023       Future Appointments   Date Time Provider Department Center   3/28/2024  9:30 AM INFUSION, [facility] INF[facility] Sebastien DUNN        Health Maintenance Due   Topic Date Due    TETANUS VACCINE  Never done    RSV Vaccine (Age 60+ and Pregnant patients) (1 - 1-dose 60+ series) Never done    Shingles Vaccine (2 of 3) 12/10/2013    Influenza Vaccine (1) 09/01/2023    COVID-19 Vaccine (6 - 2023-24 season) 09/01/2023    Mammogram  11/14/2023        Follow up if symptoms worsen or fail to improve.     Signature:  SHARON CUETO  [address] [place]  [address]    Date of encounter: 11/30/23

## 2023-12-01 PROBLEM — M54.41 ACUTE BILATERAL LOW BACK PAIN WITH RIGHT-SIDED SCIATICA: Status: ACTIVE | Noted: 2023-12-01

## 2023-12-01 NOTE — ASSESSMENT & PLAN NOTE
Toradol IM and Decadron IM today. Robaxin and Medrol Dose pack Rx today. Medication instruction and education given with understanding voiced. Rest. RTC with worsening, new or persistent symptoms.

## 2023-12-11 ENCOUNTER — OFFICE VISIT (OUTPATIENT)
Dept: FAMILY MEDICINE | Facility: CLINIC | Age: 73
End: 2023-12-11
Payer: COMMERCIAL

## 2023-12-11 VITALS
SYSTOLIC BLOOD PRESSURE: 102 MMHG | DIASTOLIC BLOOD PRESSURE: 68 MMHG | BODY MASS INDEX: 29.26 KG/M2 | OXYGEN SATURATION: 96 % | TEMPERATURE: 97 F | HEIGHT: 65 IN | HEART RATE: 81 BPM | RESPIRATION RATE: 18 BRPM | WEIGHT: 175.63 LBS

## 2023-12-11 DIAGNOSIS — M79.605 LEFT LEG PAIN: ICD-10-CM

## 2023-12-11 DIAGNOSIS — M79.2 NEUROPATHIC PAIN: Primary | ICD-10-CM

## 2023-12-11 DIAGNOSIS — M54.16 LUMBAR RADICULOPATHY: ICD-10-CM

## 2023-12-11 PROCEDURE — 1125F PR PAIN SEVERITY QUANTIFIED, PAIN PRESENT: ICD-10-PCS | Mod: ,,, | Performed by: NURSE PRACTITIONER

## 2023-12-11 PROCEDURE — 1101F PR PT FALLS ASSESS DOC 0-1 FALLS W/OUT INJ PAST YR: ICD-10-PCS | Mod: ,,, | Performed by: NURSE PRACTITIONER

## 2023-12-11 PROCEDURE — 3288F PR FALLS RISK ASSESSMENT DOCUMENTED: ICD-10-PCS | Mod: ,,, | Performed by: NURSE PRACTITIONER

## 2023-12-11 PROCEDURE — 3288F FALL RISK ASSESSMENT DOCD: CPT | Mod: ,,, | Performed by: NURSE PRACTITIONER

## 2023-12-11 PROCEDURE — 3078F DIAST BP <80 MM HG: CPT | Mod: ,,, | Performed by: NURSE PRACTITIONER

## 2023-12-11 PROCEDURE — 99213 OFFICE O/P EST LOW 20 MIN: CPT | Mod: ,,, | Performed by: NURSE PRACTITIONER

## 2023-12-11 PROCEDURE — 1125F AMNT PAIN NOTED PAIN PRSNT: CPT | Mod: ,,, | Performed by: NURSE PRACTITIONER

## 2023-12-11 PROCEDURE — 3074F PR MOST RECENT SYSTOLIC BLOOD PRESSURE < 130 MM HG: ICD-10-PCS | Mod: ,,, | Performed by: NURSE PRACTITIONER

## 2023-12-11 PROCEDURE — 99213 PR OFFICE/OUTPT VISIT, EST, LEVL III, 20-29 MIN: ICD-10-PCS | Mod: ,,, | Performed by: NURSE PRACTITIONER

## 2023-12-11 PROCEDURE — 1159F MED LIST DOCD IN RCRD: CPT | Mod: ,,, | Performed by: NURSE PRACTITIONER

## 2023-12-11 PROCEDURE — 1159F PR MEDICATION LIST DOCUMENTED IN MEDICAL RECORD: ICD-10-PCS | Mod: ,,, | Performed by: NURSE PRACTITIONER

## 2023-12-11 PROCEDURE — 1101F PT FALLS ASSESS-DOCD LE1/YR: CPT | Mod: ,,, | Performed by: NURSE PRACTITIONER

## 2023-12-11 PROCEDURE — 3078F PR MOST RECENT DIASTOLIC BLOOD PRESSURE < 80 MM HG: ICD-10-PCS | Mod: ,,, | Performed by: NURSE PRACTITIONER

## 2023-12-11 PROCEDURE — 3074F SYST BP LT 130 MM HG: CPT | Mod: ,,, | Performed by: NURSE PRACTITIONER

## 2023-12-11 RX ORDER — GABAPENTIN 300 MG/1
300 CAPSULE ORAL 3 TIMES DAILY PRN
Qty: 90 CAPSULE | Refills: 1 | Status: SHIPPED | OUTPATIENT
Start: 2023-12-11 | End: 2024-03-11 | Stop reason: SDUPTHER

## 2023-12-11 NOTE — PROGRESS NOTES
Nikki Bridges NP   [place] [address]  [place]  [address]      PATIENT NAME: Delmis Shell  : 1950  DATE: 23  MRN: [id number]      Billing Provider: Nikki Bridges NP  Level of Service: NE OFFICE/OUTPT VISIT, EST, LEVL III, 20-29 MIN  Patient PCP Information       Provider PCP Type    Nikki Bridges NP General            Reason for Visit / Chief Complaint: Numbness (Reports some numbness in left leg going from upper thigh all the way down her leg. Reports this has been going on for a while and it was more of a throbbing pain but now it feels numb. She reports her calf was swollen this morning. )         History of Present Illness / Problem Focused Workflow     73 year old female presents to clinic with complaints of numbness in left leg going from upper thigh all the way down her leg. Reports this has been going on for a while and it was more of a throbbing pain but now it feels numb. She reports her calf was swollen this morning.           Review of Systems     @Review of Systems   Constitutional:  Negative for activity change, appetite change, fatigue and fever.   HENT:  Negative for nasal congestion, ear pain, rhinorrhea, sinus pressure/congestion and sore throat.    Eyes:  Negative for pain, redness, visual disturbance and eye dryness.   Respiratory:  Negative for cough and shortness of breath.    Cardiovascular:  Negative for chest pain and leg swelling.   Gastrointestinal:  Negative for abdominal distention, abdominal pain, constipation and diarrhea.   Endocrine: Negative for cold intolerance, heat intolerance and polyuria.   Genitourinary:  Negative for bladder incontinence, dysuria, frequency and urgency.   Musculoskeletal:  Positive for back pain and leg pain. Negative for arthralgias, gait problem and myalgias.   Integumentary:  Negative for color change, rash and wound.   Allergic/Immunologic: Negative for environmental allergies and food allergies.    Neurological:  Negative for dizziness, weakness, light-headedness and headaches.   Psychiatric/Behavioral:  Negative for behavioral problems and sleep disturbance.        Medical / Social / Family History     Past Medical History:   Diagnosis Date    Arthritis     Cataract     Family history of breast cancer in sister 11/14/2022    Hyperlipidemia     Hypertension     Left hip pain     Osteoporosis     Overactive bladder     Venous insufficiency, peripheral     Vitamin D deficiency        Past Surgical History:   Procedure Laterality Date    CATARACT EXTRACTION W/ INTRAOCULAR LENS  IMPLANT, BILATERAL Bilateral     EXCISION OF BREAST LESION Right 1970    KNEE ARTHROSCOPY Right 2020    low back disc surgery  2014    in [place]    TUBAL LIGATION      VAGINAL DELIVERY  1980    VAGINAL DELIVERY  1975    VENOUS ABLATION Right 09/18/2020    Anterior Shin Varithena Ablation performed by DR. Cesar Dumont.    VENOUS ABLATION Left 09/11/2020    Lower Anterior Shin Varithen Ablation performed by Dr. Cesar Dumont.       Medications and Allergies     Medications  Outpatient Medications Marked as Taking for the 12/11/23 encounter (Office Visit) with Nikki Bridges NP   Medication Sig Dispense Refill    aspirin (ECOTRIN) 81 MG EC tablet Take 81 mg by mouth once daily.      calcium carb and citrate-vitD3 600 mg calcium- 500 unit TbSR Take 1 tablet by mouth twice a week.      denosumab (PROLIA) 60 mg/mL Syrg Inject 60 mg into the skin every 6 (six) months.      diclofenac sodium (VOLTAREN) 1 % Gel Apply 2 g topically once daily. 3 each 1    docusate sodium (COLACE) 100 MG capsule Take 1 capsule by mouth once daily.      ergocalciferol (ERGOCALCIFEROL) 50,000 unit Cap TAKE 1 CAPSULE BY MOUTH ONCE WEEKLY 12 capsule 1    garlic 1,000 mg Cap Take 1 capsule by mouth once daily.      hydrOXYzine pamoate (VISTARIL) 25 MG Cap TAKE 1 CAPSULE BY MOUTH 2 TIMES A DAY AS NEEDED FOR ITCHING 180 capsule 1    loratadine (CLARITIN) 10 mg tablet  Take 1 tablet (10 mg total) by mouth once daily. 90 tablet 1    meloxicam (MOBIC) 15 MG tablet Take 1 tablet (15 mg total) by mouth once daily. 90 tablet 1    [] methocarbamoL (ROBAXIN) 500 MG Tab Take 1 tablet (500 mg total) by mouth 3 (three) times daily. for 10 days 30 tablet 0    olmesartan-hydrochlorothiazide (BENICAR HCT) 20-12.5 mg per tablet TAKE 1 TABLET BY MOUTH DAILY. 90 tablet 0    olopatadine (PATANOL) 0.1 % ophthalmic solution Place 1 drop into both eyes 2 (two) times daily.      oxybutynin (DITROPAN) 5 MG Tab TAKE 1 TABLET BY MOUTH THREE TIMES DAILY. 90 tablet 3    potassium chloride (KLOR-CON) 10 MEQ TbSR Take 10 mEq by mouth once daily.      rosuvastatin (CRESTOR) 10 MG tablet Take 1 tablet (10 mg total) by mouth once daily. 90 tablet 3    tiZANidine (ZANAFLEX) 4 MG tablet TAKE 1 TABLET BY MOUTH EVERY 6 HOURS AS NEEDED FOR MUSCLE SPASMS 30 tablet 3    traMADoL (ULTRAM) 50 mg tablet Take 1 tablet (50 mg total) by mouth every 12 (twelve) hours as needed for Pain. 12 tablet 0    triamcinolone acetonide 0.1% (KENALOG) 0.1 % cream Apply topically 2 (two) times daily. 28 g 2    [DISCONTINUED] gabapentin (NEURONTIN) 300 MG capsule TAKE 1 CAPSULE BY MOUTH EVERY EVENING. 30 capsule 3       Allergies  Review of patient's allergies indicates:  No Known Allergies    Physical Examination     Vitals:    23 1637   BP: 102/68   Pulse:    Resp:    Temp:      Physical Exam  Vitals and nursing note reviewed.   HENT:      Head: Normocephalic.      Right Ear: Tympanic membrane normal.      Left Ear: Tympanic membrane normal.      Nose: Nose normal.      Mouth/Throat:      Mouth: Mucous membranes are moist.      Pharynx: Oropharynx is clear. No posterior oropharyngeal erythema.   Eyes:      Conjunctiva/sclera: Conjunctivae normal.   Cardiovascular:      Rate and Rhythm: Normal rate and regular rhythm.      Pulses: Normal pulses.      Heart sounds: Normal heart sounds.   Pulmonary:      Effort: Pulmonary  effort is normal.      Breath sounds: Normal breath sounds.   Abdominal:      General: Abdomen is flat. Bowel sounds are normal. There is no distension.      Palpations: Abdomen is soft.   Musculoskeletal:         General: No swelling. Normal range of motion.      Cervical back: Normal range of motion.      Right lower leg: No edema.      Left lower leg: Tenderness present. 1+ Edema present.      Comments: Left leg slightly swollen and tender. There is a knot felt in left upper calf.    Skin:     General: Skin is warm and dry.      Capillary Refill: Capillary refill takes less than 2 seconds.   Neurological:      Mental Status: She is alert. Mental status is at baseline.   Psychiatric:         Mood and Affect: Mood normal.         Behavior: Behavior normal.               Lab Results   Component Value Date    WBC 4.89 12/12/2022    HGB 13.4 12/12/2022    HCT 51.2 (H) 12/12/2022    .3 (H) 12/12/2022     12/12/2022        CMP  Sodium   Date Value Ref Range Status   01/25/2023 139 136 - 145 mmol/L Final     Potassium   Date Value Ref Range Status   01/25/2023 4.3 3.5 - 5.1 mmol/L Final     Chloride   Date Value Ref Range Status   01/25/2023 104 98 - 107 mmol/L Final     CO2   Date Value Ref Range Status   01/25/2023 31 21 - 32 mmol/L Final     Glucose   Date Value Ref Range Status   01/25/2023 93 74 - 106 mg/dL Final     BUN   Date Value Ref Range Status   01/25/2023 19 (H) 7 - 18 mg/dL Final     Creatinine   Date Value Ref Range Status   01/25/2023 1.04 (H) 0.55 - 1.02 mg/dL Final     Calcium   Date Value Ref Range Status   09/26/2023 8.5 8.5 - 10.1 mg/dL Final     Total Protein   Date Value Ref Range Status   01/25/2023 7.5 6.4 - 8.2 g/dL Final     Albumin   Date Value Ref Range Status   01/25/2023 3.4 (L) 3.5 - 5.0 g/dL Final     Bilirubin, Total   Date Value Ref Range Status   01/25/2023 0.4 >0.0 - 1.2 mg/dL Final     Alk Phos   Date Value Ref Range Status   01/25/2023 61 55 - 142 U/L Final     AST    Date Value Ref Range Status   01/25/2023 23 15 - 37 U/L Final     ALT   Date Value Ref Range Status   01/25/2023 35 13 - 56 U/L Final     Anion Gap   Date Value Ref Range Status   01/25/2023 8 7 - 16 mmol/L Final     eGFR   Date Value Ref Range Status   01/25/2023 57 (L) >=60 mL/min/1.73m² Final     Procedures   Assessment and Plan (including Health Maintenance)   :    Plan:     Problem List Items Addressed This Visit          Neuro    Lumbar radiculopathy    Current Assessment & Plan     Proceed with referral to [facility]. Increase Gabapentin to 300mg TID PRN.                Orthopedic    Left leg pain    Current Assessment & Plan     Due to left leg swelling and knot noted we will send for US to rule out DVT.          Relevant Orders    US Lower Extremity Veins Left (Completed)     Other Visit Diagnoses       Neuropathic pain    -  Primary    Relevant Medications    gabapentin (NEURONTIN) 300 MG capsule            Health Maintenance Topics with due status: Not Due       Topic Last Completion Date    Colorectal Cancer Screening 10/13/2021    Lipid Panel 01/25/2023    DEXA Scan 02/02/2023       Future Appointments   Date Time Provider Department Center   3/28/2024  9:30 AM [facility] [place] [facility] [place] [facility]        Health Maintenance Due   Topic Date Due    TETANUS VACCINE  Never done    RSV Vaccine (Age 60+ and Pregnant patients) (1 - 1-dose 60+ series) Never done    Shingles Vaccine (2 of 3) 12/10/2013    Influenza Vaccine (1) 09/01/2023    COVID-19 Vaccine (6 - 2023-24 season) 09/01/2023    Mammogram  11/14/2023          Signature:  Nikki Bridges NP  [address]  [address]    Date of encounter: 12/11/23

## 2023-12-18 ENCOUNTER — TELEPHONE (OUTPATIENT)
Dept: FAMILY MEDICINE | Facility: CLINIC | Age: 73
End: 2023-12-18
Payer: COMMERCIAL

## 2023-12-18 NOTE — TELEPHONE ENCOUNTER
Jaci Shell left a voicemail needing a return call regarding patient. She was wanting to know when her ultrasound appt was and also if she could bring the patient's medications by Friday so we could go over them and let her know what each one is for. I told her that would be fine for her to come and let us take a look. She voiced understanding.

## 2023-12-19 ENCOUNTER — HOSPITAL ENCOUNTER (OUTPATIENT)
Dept: RADIOLOGY | Facility: HOSPITAL | Age: 73
Discharge: HOME | End: 2023-12-19
Attending: NURSE PRACTITIONER
Payer: COMMERCIAL

## 2023-12-19 DIAGNOSIS — M79.605 LEFT LEG PAIN: ICD-10-CM

## 2023-12-19 PROCEDURE — 93971 EXTREMITY STUDY: CPT | Mod: TC,LT

## 2023-12-19 NOTE — PROGRESS NOTES
US reviewed: Please contact patient and notify her that US showed no signs of blood clot. Follow up if symptoms persist.

## 2023-12-31 ENCOUNTER — EXTERNAL CHRONIC CARE MANAGEMENT (OUTPATIENT)
Dept: FAMILY MEDICINE | Facility: CLINIC | Age: 73
End: 2023-12-31
Payer: COMMERCIAL

## 2023-12-31 PROCEDURE — G0511 CCM/BHI BY RHC/FQHC 20MIN MO: HCPCS | Mod: ,,, | Performed by: NURSE PRACTITIONER

## 2024-01-31 ENCOUNTER — EXTERNAL CHRONIC CARE MANAGEMENT (OUTPATIENT)
Dept: FAMILY MEDICINE | Facility: CLINIC | Age: 74
End: 2024-01-31
Payer: COMMERCIAL

## 2024-01-31 PROCEDURE — G0511 CCM/BHI BY RHC/FQHC 20MIN MO: HCPCS | Mod: ,,, | Performed by: NURSE PRACTITIONER

## 2024-02-08 ENCOUNTER — TELEPHONE (OUTPATIENT)
Dept: FAMILY MEDICINE | Facility: CLINIC | Age: 74
End: 2024-02-08
Payer: COMMERCIAL

## 2024-02-08 NOTE — TELEPHONE ENCOUNTER
----- Message from Aisha Alexander RN sent at 2024 12:49 PM CST -----  Regarding: NTP Prolia  Pt is scheduled for Prolia injection on 24 and will need a new therapy plan. (Current plan is )   Thank You     no

## 2024-02-12 DIAGNOSIS — M54.16 LUMBAR RADICULOPATHY: Primary | ICD-10-CM

## 2024-02-12 RX ORDER — TIZANIDINE 4 MG/1
TABLET ORAL
Qty: 30 TABLET | Refills: 2 | Status: SHIPPED | OUTPATIENT
Start: 2024-02-12

## 2024-02-12 NOTE — TELEPHONE ENCOUNTER
The pharmacist from [facility] called and stated has brought in an empty bottle of flexeril to pharmacy to be filled. Explained to pharmacist her chart did not show a prescription for flexeril currently. She does have zanaflex listed on her chart. Will ask pcp about refilling this medication(zanaflex).

## 2024-02-29 ENCOUNTER — EXTERNAL CHRONIC CARE MANAGEMENT (OUTPATIENT)
Dept: FAMILY MEDICINE | Facility: CLINIC | Age: 74
End: 2024-02-29
Payer: COMMERCIAL

## 2024-02-29 PROCEDURE — G0511 CCM/BHI BY RHC/FQHC 20MIN MO: HCPCS | Mod: ,,, | Performed by: NURSE PRACTITIONER

## 2024-03-11 ENCOUNTER — OFFICE VISIT (OUTPATIENT)
Dept: FAMILY MEDICINE | Facility: CLINIC | Age: 74
End: 2024-03-11
Payer: COMMERCIAL

## 2024-03-11 VITALS
DIASTOLIC BLOOD PRESSURE: 74 MMHG | OXYGEN SATURATION: 97 % | RESPIRATION RATE: 18 BRPM | TEMPERATURE: 99 F | SYSTOLIC BLOOD PRESSURE: 114 MMHG | HEIGHT: 65 IN | BODY MASS INDEX: 28.96 KG/M2 | WEIGHT: 173.81 LBS | HEART RATE: 74 BPM

## 2024-03-11 DIAGNOSIS — L29.9 ITCHING: ICD-10-CM

## 2024-03-11 DIAGNOSIS — M79.2 NEUROPATHIC PAIN: ICD-10-CM

## 2024-03-11 DIAGNOSIS — E55.9 VITAMIN D DEFICIENCY: ICD-10-CM

## 2024-03-11 DIAGNOSIS — L98.9 DISORDER OF SKIN AND SUBCUTANEOUS TISSUE: ICD-10-CM

## 2024-03-11 DIAGNOSIS — M17.11 OSTEOARTHRITIS OF RIGHT KNEE, UNSPECIFIED OSTEOARTHRITIS TYPE: ICD-10-CM

## 2024-03-11 DIAGNOSIS — I10 HYPERTENSION, UNSPECIFIED TYPE: Primary | ICD-10-CM

## 2024-03-11 PROCEDURE — 96372 THER/PROPH/DIAG INJ SC/IM: CPT | Mod: ,,,

## 2024-03-11 PROCEDURE — 99214 OFFICE O/P EST MOD 30 MIN: CPT | Mod: 25,,,

## 2024-03-11 PROCEDURE — 1159F MED LIST DOCD IN RCRD: CPT | Mod: ,,,

## 2024-03-11 PROCEDURE — 3078F DIAST BP <80 MM HG: CPT | Mod: ,,,

## 2024-03-11 PROCEDURE — 1125F AMNT PAIN NOTED PAIN PRSNT: CPT | Mod: ,,,

## 2024-03-11 PROCEDURE — 1101F PT FALLS ASSESS-DOCD LE1/YR: CPT | Mod: ,,,

## 2024-03-11 PROCEDURE — 3288F FALL RISK ASSESSMENT DOCD: CPT | Mod: ,,,

## 2024-03-11 PROCEDURE — 1160F RVW MEDS BY RX/DR IN RCRD: CPT | Mod: ,,,

## 2024-03-11 PROCEDURE — 3074F SYST BP LT 130 MM HG: CPT | Mod: ,,,

## 2024-03-11 RX ORDER — AMOXICILLIN 875 MG/1
875 TABLET, FILM COATED ORAL 2 TIMES DAILY
COMMUNITY
Start: 2024-02-07 | End: 2024-04-12

## 2024-03-11 RX ORDER — IBUPROFEN 800 MG/1
800 TABLET ORAL EVERY 12 HOURS PRN
Qty: 90 TABLET | Refills: 1 | Status: SHIPPED | OUTPATIENT
Start: 2024-03-11 | End: 2024-06-09

## 2024-03-11 RX ORDER — MELOXICAM 15 MG/1
15 TABLET ORAL DAILY
Qty: 90 TABLET | Refills: 1 | Status: SHIPPED | OUTPATIENT
Start: 2024-03-11

## 2024-03-11 RX ORDER — TRIAMCINOLONE ACETONIDE 1 MG/G
CREAM TOPICAL 2 TIMES DAILY
Qty: 28 G | Refills: 2 | Status: SHIPPED | OUTPATIENT
Start: 2024-03-11

## 2024-03-11 RX ORDER — OXYBUTYNIN CHLORIDE 5 MG/1
5 TABLET ORAL 3 TIMES DAILY
Qty: 90 TABLET | Refills: 3 | Status: SHIPPED | OUTPATIENT
Start: 2024-03-11

## 2024-03-11 RX ORDER — SULFAMETHOXAZOLE AND TRIMETHOPRIM 800; 160 MG/1; MG/1
1 TABLET ORAL 2 TIMES DAILY
Qty: 20 TABLET | Refills: 0 | Status: SHIPPED | OUTPATIENT
Start: 2024-03-11 | End: 2024-03-21

## 2024-03-11 RX ORDER — OLMESARTAN MEDOXOMIL AND HYDROCHLOROTHIAZIDE 20/12.5 20; 12.5 MG/1; MG/1
1 TABLET ORAL DAILY
Qty: 90 TABLET | Refills: 0 | Status: SHIPPED | OUTPATIENT
Start: 2024-03-11

## 2024-03-11 RX ORDER — MUPIROCIN 20 MG/G
OINTMENT TOPICAL 3 TIMES DAILY
Qty: 22 G | Refills: 0 | Status: SHIPPED | OUTPATIENT
Start: 2024-03-11

## 2024-03-11 RX ORDER — ERGOCALCIFEROL 1.25 MG/1
50000 CAPSULE ORAL
Qty: 12 CAPSULE | Refills: 1 | Status: SHIPPED | OUTPATIENT
Start: 2024-03-11

## 2024-03-11 RX ORDER — GABAPENTIN 300 MG/1
300 CAPSULE ORAL 3 TIMES DAILY PRN
Qty: 90 CAPSULE | Refills: 1 | Status: SHIPPED | OUTPATIENT
Start: 2024-03-11

## 2024-03-11 RX ORDER — LINCOMYCIN HYDROCHLORIDE 300 MG/ML
600 INJECTION, SOLUTION INTRAMUSCULAR; INTRAVENOUS; SUBCONJUNCTIVAL
Status: COMPLETED | OUTPATIENT
Start: 2024-03-11 | End: 2024-03-11

## 2024-03-11 RX ADMIN — LINCOMYCIN HYDROCHLORIDE 600 MG: 300 INJECTION, SOLUTION INTRAMUSCULAR; INTRAVENOUS; SUBCONJUNCTIVAL at 03:03

## 2024-03-11 NOTE — PROGRESS NOTES
SHARON CUETO   [facility]  [address]  [place]  [address]      PATIENT NAME: Delmis Shell  : 1950  DATE: 3/11/24  MRN: [id number]        Reason for Visit / Chief Complaint: Ingrown Toenail (Karla Shell 74 year old female presents with possible ingrown right Great Toe to inside of foot.), Back Pain (Patient requested pain medication refill), and Health Maintenance (TETANUS VACCINE Never done/RSV Vaccine (Age 60+ and Pregnant patients)(1 - 1-dose 60+ series) Never done/Shingles Vaccine(2 of 3) due on 12/10/2013/COVID-19 Vaccine( season) due on 2023/Mammogram due on 2023/)         History of Present Illness / Problem Focused Workflow     75 y/o female presents to clinic with complaints of pain top right great toe. State she noticed it was sore a couple days ago and has not improved. Pt denies any drainage or pain on toenail. Pt denies any known injury to toe or foot.       Review of Systems     @Review of Systems   Constitutional:  Negative for chills, fatigue and fever.   HENT:  Negative for nasal congestion, ear discharge, ear pain, rhinorrhea, sinus pressure/congestion and sore throat.    Respiratory:  Negative for cough, chest tightness, shortness of breath, wheezing and stridor.    Cardiovascular:  Negative for palpitations and claudication.   Gastrointestinal:  Negative for abdominal pain, constipation, diarrhea, nausea, vomiting and reflux.   Genitourinary:  Negative for dysuria, flank pain, frequency, hematuria and urgency.   Musculoskeletal:  Negative for myalgias.   Integumentary:  Positive for wound.   Neurological:  Negative for dizziness, weakness, light-headedness and headaches.   Psychiatric/Behavioral:  Negative for suicidal ideas.        Medical / Social / Family History     Past Medical History:   Diagnosis Date    Arthritis     Cataract     Family history of breast cancer in sister 2022    Hyperlipidemia     Hypertension      Left hip pain     Osteoporosis     Overactive bladder     Venous insufficiency, peripheral     Vitamin D deficiency        Past Surgical History:   Procedure Laterality Date    CATARACT EXTRACTION W/ INTRAOCULAR LENS  IMPLANT, BILATERAL Bilateral     EXCISION OF BREAST LESION Right 1970    KNEE ARTHROSCOPY Right 2020    low back disc surgery  2014    in [place]    TUBAL LIGATION      VAGINAL DELIVERY  1980    VAGINAL DELIVERY  1975    VENOUS ABLATION Right 09/18/2020    Anterior Shin Varithena Ablation performed by DR. Cesar Dumont.    VENOUS ABLATION Left 09/11/2020    Lower Anterior Shin Varithen Ablation performed by Dr. Cesar Dumont.           Medications and Allergies     Medications  Outpatient Medications Marked as Taking for the 3/11/24 encounter (Office Visit) with Steven Larson FNP   Medication Sig Dispense Refill    amoxicillin (AMOXIL) 875 MG tablet Take 875 mg by mouth 2 (two) times daily.      aspirin (ECOTRIN) 81 MG EC tablet Take 81 mg by mouth once daily.      calcium carb and citrate-vitD3 600 mg calcium- 500 unit TbSR Take 1 tablet by mouth twice a week.      docusate sodium (COLACE) 100 MG capsule Take 1 capsule by mouth once daily.      fluticasone propionate (FLOVENT DISKUS) 50 mcg/actuation DsDv Inhale 1 spray into the lungs 2 (two) times a day. Controller 3 each 1    garlic 1,000 mg Cap Take 1 capsule by mouth once daily.      hydrOXYzine pamoate (VISTARIL) 25 MG Cap TAKE 1 CAPSULE BY MOUTH 2 TIMES A DAY AS NEEDED FOR ITCHING 180 capsule 1    loratadine (CLARITIN) 10 mg tablet Take 1 tablet (10 mg total) by mouth once daily. 90 tablet 1    olopatadine (PATANOL) 0.1 % ophthalmic solution Place 1 drop into both eyes 2 (two) times daily.      potassium chloride (KLOR-CON) 10 MEQ TbSR Take 10 mEq by mouth once daily.      rosuvastatin (CRESTOR) 10 MG tablet Take 1 tablet (10 mg total) by mouth once daily. 90 tablet 3    tiZANidine (ZANAFLEX) 4 MG tablet Take 1 tablet by mouth every 8 hours  as needed for muscle spasms. 30 tablet 2    traMADoL (ULTRAM) 50 mg tablet Take 1 tablet (50 mg total) by mouth every 12 (twelve) hours as needed for Pain. 12 tablet 0    [DISCONTINUED] denosumab (PROLIA) 60 mg/mL Syrg Inject 60 mg into the skin every 6 (six) months.      [DISCONTINUED] ergocalciferol (ERGOCALCIFEROL) 50,000 unit Cap TAKE 1 CAPSULE BY MOUTH ONCE WEEKLY 12 capsule 1    [DISCONTINUED] gabapentin (NEURONTIN) 300 MG capsule Take 1 capsule (300 mg total) by mouth 3 (three) times daily as needed (Neuropathy). 90 capsule 1    [DISCONTINUED] ibuprofen (ADVIL,MOTRIN) 800 MG tablet Take 800 mg by mouth every 12 (twelve) hours as needed.      [DISCONTINUED] meloxicam (MOBIC) 15 MG tablet Take 1 tablet (15 mg total) by mouth once daily. 90 tablet 1    [DISCONTINUED] olmesartan-hydrochlorothiazide (BENICAR HCT) 20-12.5 mg per tablet TAKE 1 TABLET BY MOUTH DAILY. 90 tablet 0    [DISCONTINUED] oxybutynin (DITROPAN) 5 MG Tab TAKE 1 TABLET BY MOUTH THREE TIMES DAILY. 90 tablet 3    [DISCONTINUED] triamcinolone acetonide 0.1% (KENALOG) 0.1 % cream Apply topically 2 (two) times daily. 28 g 2       Allergies  Review of patient's allergies indicates:  No Known Allergies    Physical Examination     Vitals:    03/11/24 1450   BP: 114/74   Pulse: 74   Resp: 18   Temp: 98.7 °F (37.1 °C)     Physical Exam  Vitals and nursing note reviewed.   Constitutional:       General: She is awake.      Appearance: Normal appearance.   HENT:      Head: Normocephalic.      Right Ear: Tympanic membrane, ear canal and external ear normal.      Left Ear: Tympanic membrane, ear canal and external ear normal.      Nose: Nose normal.      Mouth/Throat:      Lips: Pink.      Mouth: Mucous membranes are moist.      Pharynx: Oropharynx is clear. Uvula midline.   Cardiovascular:      Rate and Rhythm: Normal rate and regular rhythm.      Heart sounds: Normal heart sounds, S1 normal and S2 normal.   Pulmonary:      Effort: Pulmonary effort is normal.  No respiratory distress.      Breath sounds: Normal breath sounds. No decreased breath sounds, wheezing, rhonchi or rales.   Abdominal:      General: Bowel sounds are normal.      Palpations: Abdomen is soft.      Tenderness: There is no abdominal tenderness.   Musculoskeletal:      Cervical back: Normal range of motion.        Feet:    Feet:      Comments: Approximately 1cm erythematous area noted to tip and underneath right great toe. TTP. No discharge/drainage noted.  Skin:     General: Skin is warm.      Capillary Refill: Capillary refill takes less than 2 seconds.   Neurological:      Mental Status: She is alert and oriented to person, place, and time.   Psychiatric:         Thought Content: Thought content does not include homicidal or suicidal ideation. Thought content does not include homicidal or suicidal plan.               Procedures   Assessment and Plan (including Health Maintenance)   :    Plan:     Problem List Items Addressed This Visit          Neuro    Neuropathic pain    Current Assessment & Plan     Symptoms controlled well. Gabapentin and ibuprofen refilled today         Relevant Medications    ibuprofen (ADVIL,MOTRIN) 800 MG tablet    gabapentin (NEURONTIN) 300 MG capsule       Derm    Itching    Current Assessment & Plan     Triamcinolone cream refilled         Relevant Medications    triamcinolone acetonide 0.1% (KENALOG) 0.1 % cream    Disorder of skin and subcutaneous tissue    Current Assessment & Plan     Lincocin IM today. Bactrim RX. Medication instructions and education given with understanding voiced. Warm epsom salt soak. RTC with worsening, new or persistent symptoms with understanding voiced.          Relevant Medications    mupirocin (BACTROBAN) 2 % ointment    sulfamethoxazole-trimethoprim 800-160mg (BACTRIM DS) 800-160 mg Tab       Cardiac/Vascular    Hypertension - Primary    Current Assessment & Plan     Blood pressure well controlled. Continue current medications. Follow up  in 3 months or as needed.            Relevant Medications    olmesartan-hydrochlorothiazide (BENICAR HCT) 20-12.5 mg per tablet       Endocrine    Vitamin D deficiency    Current Assessment & Plan     Refilled Ergocalciferol. Continue at current dosage.          Relevant Medications    ergocalciferol (ERGOCALCIFEROL) 50,000 unit Cap       Orthopedic    RESOLVED: Osteoarthritis of right knee    Relevant Medications    meloxicam (MOBIC) 15 MG tablet       Health Maintenance Topics with due status: Not Due       Topic Last Completion Date    Lipid Panel 01/25/2023    DEXA Scan 02/02/2023       Future Appointments   Date Time Provider Department Center   3/28/2024  9:30 AM INFUSION, [facility] [place] [facility]        Health Maintenance Due   Topic Date Due    TETANUS VACCINE  Never done    RSV Vaccine (Age 60+ and Pregnant patients) (1 - 1-dose 60+ series) Never done    Shingles Vaccine (2 of 3) 12/10/2013    COVID-19 Vaccine (6 - 2023-24 season) 09/01/2023    Mammogram  11/14/2023        Follow up if symptoms worsen or fail to improve.     Signature:  SHARON CUETO  [facility]  [id number] [place] [address], [place]  [address]    Date of encounter: 3/11/24

## 2024-03-12 PROBLEM — M17.11 OSTEOARTHRITIS OF RIGHT KNEE: Status: RESOLVED | Noted: 2023-06-15 | Resolved: 2024-03-12

## 2024-03-12 PROBLEM — L98.9 DISORDER OF SKIN AND SUBCUTANEOUS TISSUE: Status: ACTIVE | Noted: 2024-03-12

## 2024-03-12 PROBLEM — M79.2 NEUROPATHIC PAIN: Status: ACTIVE | Noted: 2024-03-12

## 2024-03-12 PROBLEM — L29.9 ITCHING: Status: ACTIVE | Noted: 2024-03-12

## 2024-03-12 NOTE — ASSESSMENT & PLAN NOTE
Lincocin IM today. Bactrim RX. Medication instructions and education given with understanding voiced. Warm epsom salt soak. RTC with worsening, new or persistent symptoms with understanding voiced.

## 2024-03-27 ENCOUNTER — OFFICE VISIT (OUTPATIENT)
Dept: FAMILY MEDICINE | Facility: CLINIC | Age: 74
End: 2024-03-27
Payer: COMMERCIAL

## 2024-03-27 VITALS
DIASTOLIC BLOOD PRESSURE: 61 MMHG | HEIGHT: 65 IN | WEIGHT: 171 LBS | HEART RATE: 93 BPM | TEMPERATURE: 98 F | SYSTOLIC BLOOD PRESSURE: 109 MMHG | BODY MASS INDEX: 28.49 KG/M2 | OXYGEN SATURATION: 96 % | RESPIRATION RATE: 18 BRPM

## 2024-03-27 DIAGNOSIS — L60.0 INGROWN TOENAIL WITHOUT INFECTION: Primary | ICD-10-CM

## 2024-03-27 PROCEDURE — 1101F PT FALLS ASSESS-DOCD LE1/YR: CPT | Mod: ,,, | Performed by: NURSE PRACTITIONER

## 2024-03-27 PROCEDURE — 1125F AMNT PAIN NOTED PAIN PRSNT: CPT | Mod: ,,, | Performed by: NURSE PRACTITIONER

## 2024-03-27 PROCEDURE — 99213 OFFICE O/P EST LOW 20 MIN: CPT | Mod: 25,,, | Performed by: NURSE PRACTITIONER

## 2024-03-27 PROCEDURE — 3074F SYST BP LT 130 MM HG: CPT | Mod: ,,, | Performed by: NURSE PRACTITIONER

## 2024-03-27 PROCEDURE — 11765 WEDGE EXCISION SKN NAIL FOLD: CPT | Mod: T5,,, | Performed by: NURSE PRACTITIONER

## 2024-03-27 PROCEDURE — 3078F DIAST BP <80 MM HG: CPT | Mod: ,,, | Performed by: NURSE PRACTITIONER

## 2024-03-27 PROCEDURE — 3288F FALL RISK ASSESSMENT DOCD: CPT | Mod: ,,, | Performed by: NURSE PRACTITIONER

## 2024-03-27 PROCEDURE — 1159F MED LIST DOCD IN RCRD: CPT | Mod: ,,, | Performed by: NURSE PRACTITIONER

## 2024-03-27 NOTE — PROGRESS NOTES
Nikki Bridges NP   [place] [id number]  [place]  [address]      PATIENT NAME: Delmis Shell  : 1950  DATE: 3/27/24  MRN: [id number]      Billing Provider: Nikki Bridges NP  Level of Service: ND OFFICE/OUTPT VISIT, EST, LEVL III, 20-29 MIN  Patient PCP Information       Provider PCP Type    Nikki Bridges NP General            Reason for Visit / Chief Complaint: Nail Problem (Karla Shell 74 year old female presents with possible ingrown toenail of right great toe.Toe is sore to touch on top of outside of toenail and nail is almost black on the outside edge. )         History of Present Illness / Problem Focused Workflow     : Delmis Shell 74 year old female presents with possible ingrown toenail of right great toe.Toe is sore to touch on top of outside of toenail and nail is almost black on the outside edge. States she has been seen in this clinic for same problem, was placed on Bactrim and has been soaking nail in Epsom salt as instructed with little improvement.             Review of Systems     @Review of Systems   Constitutional:  Negative for activity change, appetite change, fatigue and fever.   HENT:  Negative for nasal congestion, ear pain, rhinorrhea, sinus pressure/congestion and sore throat.    Eyes:  Negative for pain, redness, visual disturbance and eye dryness.   Respiratory:  Negative for cough and shortness of breath.    Cardiovascular:  Negative for chest pain and leg swelling.   Gastrointestinal:  Negative for abdominal distention, abdominal pain, constipation and diarrhea.   Endocrine: Negative for cold intolerance, heat intolerance and polyuria.   Genitourinary:  Negative for bladder incontinence, dysuria, frequency and urgency.   Musculoskeletal:  Negative for arthralgias, gait problem and myalgias.   Integumentary:  Negative for color change, rash and wound.        Ingrown nail right great toe.    Allergic/Immunologic: Negative for  environmental allergies and food allergies.   Neurological:  Negative for dizziness, weakness, light-headedness and headaches.   Psychiatric/Behavioral:  Negative for behavioral problems and sleep disturbance.        Medical / Social / Family History     Past Medical History:   Diagnosis Date    Arthritis     Cataract     Family history of breast cancer in sister 11/14/2022    Hyperlipidemia     Hypertension     Left hip pain     Osteoporosis     Overactive bladder     Venous insufficiency, peripheral     Vitamin D deficiency        Past Surgical History:   Procedure Laterality Date    CATARACT EXTRACTION W/ INTRAOCULAR LENS  IMPLANT, BILATERAL Bilateral     EXCISION OF BREAST LESION Right 1970    KNEE ARTHROSCOPY Right 2020    low back disc surgery  2014    in [place]    TUBAL LIGATION      VAGINAL DELIVERY  1980    VAGINAL DELIVERY  1975    VENOUS ABLATION Right 09/18/2020    Anterior Shin Varithena Ablation performed by DR. Cesar Dumont.    VENOUS ABLATION Left 09/11/2020    Lower Anterior Shin Varithen Ablation performed by Dr. Cesar Dumont.       Medications and Allergies     Medications  Outpatient Medications Marked as Taking for the 3/27/24 encounter (Office Visit) with Nikki Bridges NP   Medication Sig Dispense Refill    aspirin (ECOTRIN) 81 MG EC tablet Take 81 mg by mouth once daily.      calcium carb and citrate-vitD3 600 mg calcium- 500 unit TbSR Take 1 tablet by mouth twice a week.      docusate sodium (COLACE) 100 MG capsule Take 1 capsule by mouth once daily.      ergocalciferol (ERGOCALCIFEROL) 50,000 unit Cap Take 1 capsule (50,000 Units total) by mouth every 7 days. 12 capsule 1    fluticasone propionate (FLOVENT DISKUS) 50 mcg/actuation DsDv Inhale 1 spray into the lungs 2 (two) times a day. Controller 3 each 1    gabapentin (NEURONTIN) 300 MG capsule Take 1 capsule (300 mg total) by mouth 3 (three) times daily as needed (Neuropathy). 90 capsule 1    garlic 1,000 mg Cap Take 1 capsule by  mouth once daily.      hydrOXYzine pamoate (VISTARIL) 25 MG Cap TAKE 1 CAPSULE BY MOUTH 2 TIMES A DAY AS NEEDED FOR ITCHING 180 capsule 1    ibuprofen (ADVIL,MOTRIN) 800 MG tablet Take 1 tablet (800 mg total) by mouth every 12 (twelve) hours as needed for Pain. 90 tablet 1    loratadine (CLARITIN) 10 mg tablet Take 1 tablet (10 mg total) by mouth once daily. 90 tablet 1    meloxicam (MOBIC) 15 MG tablet Take 1 tablet (15 mg total) by mouth once daily. 90 tablet 1    mupirocin (BACTROBAN) 2 % ointment Apply topically 3 (three) times daily. 22 g 0    olmesartan-hydrochlorothiazide (BENICAR HCT) 20-12.5 mg per tablet Take 1 tablet by mouth once daily. 90 tablet 0    olopatadine (PATANOL) 0.1 % ophthalmic solution Place 1 drop into both eyes 2 (two) times daily.      oxybutynin (DITROPAN) 5 MG Tab Take 1 tablet (5 mg total) by mouth 3 (three) times daily. 90 tablet 3    potassium chloride (KLOR-CON) 10 MEQ TbSR Take 10 mEq by mouth once daily.      rosuvastatin (CRESTOR) 10 MG tablet Take 1 tablet (10 mg total) by mouth once daily. 90 tablet 3    tiZANidine (ZANAFLEX) 4 MG tablet Take 1 tablet by mouth every 8 hours as needed for muscle spasms. 30 tablet 2    traMADoL (ULTRAM) 50 mg tablet Take 1 tablet (50 mg total) by mouth every 12 (twelve) hours as needed for Pain. 12 tablet 0    triamcinolone acetonide 0.1% (KENALOG) 0.1 % cream Apply topically 2 (two) times daily. 28 g 2       Allergies  Review of patient's allergies indicates:  No Known Allergies    Physical Examination     Vitals:    03/27/24 1115   BP: 109/61   Pulse: 93   Resp: 18   Temp: 97.9 °F (36.6 °C)     Physical Exam  Vitals and nursing note reviewed.   HENT:      Head: Normocephalic.      Right Ear: Tympanic membrane normal.      Left Ear: Tympanic membrane normal.      Nose: Nose normal.      Mouth/Throat:      Mouth: Mucous membranes are moist.      Pharynx: Oropharynx is clear. No posterior oropharyngeal erythema.   Eyes:      Conjunctiva/sclera:  Conjunctivae normal.   Cardiovascular:      Rate and Rhythm: Normal rate and regular rhythm.      Pulses: Normal pulses.      Heart sounds: Normal heart sounds.   Pulmonary:      Effort: Pulmonary effort is normal.      Breath sounds: Normal breath sounds.   Abdominal:      General: Abdomen is flat. Bowel sounds are normal. There is no distension.      Palpations: Abdomen is soft.   Musculoskeletal:         General: No swelling or tenderness. Normal range of motion.      Cervical back: Normal range of motion.      Right lower leg: No edema.      Left lower leg: No edema.   Feet:      Right foot:      Toenail Condition: Right toenails are ingrown.      Comments: Ingrown nail on lateral aspect of right great toe.   Skin:     General: Skin is warm and dry.      Capillary Refill: Capillary refill takes less than 2 seconds.   Neurological:      Mental Status: She is alert. Mental status is at baseline.   Psychiatric:         Mood and Affect: Mood normal.         Behavior: Behavior normal.               Lab Results   Component Value Date    WBC 4.89 12/12/2022    HGB 13.4 12/12/2022    HCT 51.2 (H) 12/12/2022    .3 (H) 12/12/2022     12/12/2022        CMP  Sodium   Date Value Ref Range Status   01/25/2023 139 136 - 145 mmol/L Final     Potassium   Date Value Ref Range Status   01/25/2023 4.3 3.5 - 5.1 mmol/L Final     Chloride   Date Value Ref Range Status   01/25/2023 104 98 - 107 mmol/L Final     CO2   Date Value Ref Range Status   01/25/2023 31 21 - 32 mmol/L Final     Glucose   Date Value Ref Range Status   01/25/2023 93 74 - 106 mg/dL Final     BUN   Date Value Ref Range Status   01/25/2023 19 (H) 7 - 18 mg/dL Final     Creatinine   Date Value Ref Range Status   01/25/2023 1.04 (H) 0.55 - 1.02 mg/dL Final     Calcium   Date Value Ref Range Status   09/26/2023 8.5 8.5 - 10.1 mg/dL Final     Total Protein   Date Value Ref Range Status   01/25/2023 7.5 6.4 - 8.2 g/dL Final     Albumin   Date Value Ref  Range Status   01/25/2023 3.4 (L) 3.5 - 5.0 g/dL Final     Bilirubin, Total   Date Value Ref Range Status   01/25/2023 0.4 >0.0 - 1.2 mg/dL Final     Alk Phos   Date Value Ref Range Status   01/25/2023 61 55 - 142 U/L Final     AST   Date Value Ref Range Status   01/25/2023 23 15 - 37 U/L Final     ALT   Date Value Ref Range Status   01/25/2023 35 13 - 56 U/L Final     Anion Gap   Date Value Ref Range Status   01/25/2023 8 7 - 16 mmol/L Final     eGFR   Date Value Ref Range Status   01/25/2023 57 (L) >=60 mL/min/1.73m² Final     Foot Care    Date/Time: 3/27/2024 11:00 AM    Performed by: Nikki Bridges NP  Authorized by: Nikki Bridges NP    Consent Done?:  Yes (Verbal)  Hyperkeratotic Skin Lesions?: No      Nail Care Type:  Trim(Right 1st Toe)  Patient tolerance:  Patient tolerated the procedure well with no immediate complications     Ingrown portion of right great toenail was lifted and trimmed.      Assessment and Plan (including Health Maintenance)   :    Plan:     Problem List Items Addressed This Visit          Derm    Ingrown toenail without infection - Primary    Current Assessment & Plan     Right great toenail on lateral aspect is curving down and is ingrown in to nail bed. Toenail was lifted and trimmed back. There are no symptoms of infection. Encouraged her to continue to use Mupirocin ointment for prevention of infection. Contacted Dr Martinez's office (podiatry)- she is a known patient of hers. Appt set up for April 26th at 930am. Patient notified. Follow up if symptoms persist or worsen.          Relevant Orders    Foot Care       Health Maintenance Topics with due status: Not Due       Topic Last Completion Date    Lipid Panel 01/25/2023    DEXA Scan 02/02/2023       Future Appointments   Date Time Provider Department Center   3/28/2024  9:30 AM INFUSION, PASQUALE FUNES [facility] INFUSN Pasquale DUNN   5/3/2024 10:00 AM AWV NURSE [facility]GINA LUTZSOL[facility] [place]        Health Maintenance Due   Topic  Date Due    TETANUS VACCINE  Never done    RSV Vaccine (Age 60+ and Pregnant patients) (1 - 1-dose 60+ series) Never done    Shingles Vaccine (2 of 3) 12/10/2013    COVID-19 Vaccine (6 - 2023-24 season) 09/01/2023    Mammogram  11/14/2023          Signature:  Nikki Bridges NP  [address]  [address]    Date of encounter: 3/27/24

## 2024-03-27 NOTE — ASSESSMENT & PLAN NOTE
Right great toenail on lateral aspect is curving down and is ingrown in to nail bed. Toenail was lifted and trimmed back. There are no symptoms of infection. Encouraged her to continue to use Mupirocin ointment for prevention of infection. Contacted Dr Martinez's office (podiatry)- she is a known patient of hers. Appt set up for April 26th at 930am. Patient notified. Follow up if symptoms persist or worsen.

## 2024-03-28 ENCOUNTER — INFUSION (OUTPATIENT)
Dept: INFUSION THERAPY | Facility: HOSPITAL | Age: 74
End: 2024-03-28
Attending: NURSE PRACTITIONER
Payer: COMMERCIAL

## 2024-03-28 VITALS
RESPIRATION RATE: 18 BRPM | SYSTOLIC BLOOD PRESSURE: 119 MMHG | TEMPERATURE: 98 F | HEART RATE: 73 BPM | DIASTOLIC BLOOD PRESSURE: 73 MMHG | OXYGEN SATURATION: 97 %

## 2024-03-28 DIAGNOSIS — M81.0 AGE-RELATED OSTEOPOROSIS WITHOUT CURRENT PATHOLOGICAL FRACTURE: ICD-10-CM

## 2024-03-28 DIAGNOSIS — M81.0 OSTEOPOROSIS, UNSPECIFIED OSTEOPOROSIS TYPE, UNSPECIFIED PATHOLOGICAL FRACTURE PRESENCE: Primary | ICD-10-CM

## 2024-03-28 LAB — CALCIUM SERPL-MCNC: 9.8 MG/DL (ref 8.5–10.1)

## 2024-03-28 PROCEDURE — 82310 ASSAY OF CALCIUM: CPT | Performed by: NURSE PRACTITIONER

## 2024-03-28 PROCEDURE — 63600175 PHARM REV CODE 636 W HCPCS: Mod: JZ,TB | Performed by: NURSE PRACTITIONER

## 2024-03-28 PROCEDURE — 96372 THER/PROPH/DIAG INJ SC/IM: CPT

## 2024-03-28 RX ADMIN — DENOSUMAB 60 MG: 60 INJECTION SUBCUTANEOUS at 10:03

## 2024-03-28 NOTE — PROGRESS NOTES
1000 Pt in room 1.      1020 Calcium level checked today was 9.8.  Pt stated that she is taking her Calcium with Vitamin D. Administered Prolia 60mg SQ to right upper arm per protocol.  Pt tolerated well.    1040 No adverse reaction noted.  D/C home, ambulatory with appointment in hand to return in 6 months for next Prolia injection.

## 2024-03-31 ENCOUNTER — EXTERNAL CHRONIC CARE MANAGEMENT (OUTPATIENT)
Dept: FAMILY MEDICINE | Facility: CLINIC | Age: 74
End: 2024-03-31
Payer: COMMERCIAL

## 2024-03-31 PROCEDURE — G0511 CCM/BHI BY RHC/FQHC 20MIN MO: HCPCS | Mod: ,,, | Performed by: NURSE PRACTITIONER

## 2024-04-12 ENCOUNTER — OFFICE VISIT (OUTPATIENT)
Dept: FAMILY MEDICINE | Facility: CLINIC | Age: 74
End: 2024-04-12
Payer: COMMERCIAL

## 2024-04-12 VITALS
OXYGEN SATURATION: 97 % | BODY MASS INDEX: 28.66 KG/M2 | TEMPERATURE: 97 F | HEART RATE: 75 BPM | RESPIRATION RATE: 20 BRPM | SYSTOLIC BLOOD PRESSURE: 158 MMHG | HEIGHT: 65 IN | WEIGHT: 172 LBS | DIASTOLIC BLOOD PRESSURE: 77 MMHG

## 2024-04-12 DIAGNOSIS — R30.0 DYSURIA: Primary | ICD-10-CM

## 2024-04-12 DIAGNOSIS — R35.0 URINARY FREQUENCY: ICD-10-CM

## 2024-04-12 LAB
BILIRUB SERPL-MCNC: NEGATIVE MG/DL
BLOOD URINE, POC: NORMAL
COLOR, POC UA: NORMAL
GLUCOSE UR QL STRIP: NEGATIVE
KETONES UR QL STRIP: NEGATIVE
LEUKOCYTE ESTERASE URINE, POC: NORMAL
NITRITE, POC UA: POSITIVE
PH, POC UA: 6
PROTEIN, POC: 30
SPECIFIC GRAVITY, POC UA: 1.02
UROBILINOGEN, POC UA: 0.2

## 2024-04-12 PROCEDURE — 1101F PT FALLS ASSESS-DOCD LE1/YR: CPT | Mod: ,,,

## 2024-04-12 PROCEDURE — 99213 OFFICE O/P EST LOW 20 MIN: CPT | Mod: 25,,,

## 2024-04-12 PROCEDURE — 3077F SYST BP >= 140 MM HG: CPT | Mod: ,,,

## 2024-04-12 PROCEDURE — 3288F FALL RISK ASSESSMENT DOCD: CPT | Mod: ,,,

## 2024-04-12 PROCEDURE — 3078F DIAST BP <80 MM HG: CPT | Mod: ,,,

## 2024-04-12 PROCEDURE — 87086 URINE CULTURE/COLONY COUNT: CPT | Mod: ,,, | Performed by: CLINICAL MEDICAL LABORATORY

## 2024-04-12 PROCEDURE — 81003 URINALYSIS AUTO W/O SCOPE: CPT | Mod: QW,,,

## 2024-04-12 PROCEDURE — 87077 CULTURE AEROBIC IDENTIFY: CPT | Mod: ,,, | Performed by: CLINICAL MEDICAL LABORATORY

## 2024-04-12 PROCEDURE — 87186 SC STD MICRODIL/AGAR DIL: CPT | Mod: ,,, | Performed by: CLINICAL MEDICAL LABORATORY

## 2024-04-12 PROCEDURE — 96372 THER/PROPH/DIAG INJ SC/IM: CPT | Mod: ,,,

## 2024-04-12 RX ORDER — CEFTRIAXONE 1 G/1
1 INJECTION, POWDER, FOR SOLUTION INTRAMUSCULAR; INTRAVENOUS
Status: COMPLETED | OUTPATIENT
Start: 2024-04-12 | End: 2024-04-12

## 2024-04-12 RX ORDER — PHENAZOPYRIDINE HYDROCHLORIDE 200 MG/1
200 TABLET, FILM COATED ORAL 3 TIMES DAILY PRN
Qty: 30 TABLET | Refills: 0 | Status: SHIPPED | OUTPATIENT
Start: 2024-04-12 | End: 2024-04-22

## 2024-04-12 RX ORDER — NITROFURANTOIN 25; 75 MG/1; MG/1
100 CAPSULE ORAL 2 TIMES DAILY
Qty: 14 CAPSULE | Refills: 0 | Status: SHIPPED | OUTPATIENT
Start: 2024-04-12 | End: 2024-04-15

## 2024-04-12 RX ADMIN — CEFTRIAXONE 1 G: 1 INJECTION, POWDER, FOR SOLUTION INTRAMUSCULAR; INTRAVENOUS at 05:04

## 2024-04-12 NOTE — PROGRESS NOTES
SHARON CUETO   [facility]  [address]  [place]  [address]      PATIENT NAME: Delmis Shell  : 1950  DATE: 24  MRN: [id number]        Reason for Visit / Chief Complaint: Urinary Frequency (Patient presents to clinic for dysuria and urinary frequency. Symptoms started about 3 days ago. Denies fever. )         History of Present Illness / Problem Focused Workflow     75 y/o female presents to clinic with complaints of urinary frequency and dysuria that started about 3 days ago. Denies any fever, chills, GI symptoms, hematuria.       Review of Systems     @Review of Systems   Constitutional:  Negative for chills, fatigue and fever.   HENT:  Negative for nasal congestion, ear discharge, ear pain, rhinorrhea, sinus pressure/congestion and sore throat.    Respiratory:  Negative for cough, chest tightness, shortness of breath, wheezing and stridor.    Cardiovascular:  Negative for palpitations and claudication.   Gastrointestinal:  Negative for abdominal pain, constipation, diarrhea, nausea, vomiting and reflux.   Genitourinary:  Positive for dysuria and frequency. Negative for flank pain, hematuria and urgency.   Musculoskeletal:  Negative for myalgias.   Neurological:  Negative for dizziness, weakness, light-headedness and headaches.   Psychiatric/Behavioral:  Negative for suicidal ideas.        Medical / Social / Family History     Past Medical History:   Diagnosis Date    Arthritis     Cataract     Family history of breast cancer in sister 2022    Hyperlipidemia     Hypertension     Left hip pain     Osteoporosis     Overactive bladder     Venous insufficiency, peripheral     Vitamin D deficiency        Past Surgical History:   Procedure Laterality Date    CATARACT EXTRACTION W/ INTRAOCULAR LENS  IMPLANT, BILATERAL Bilateral     EXCISION OF BREAST LESION Right     KNEE ARTHROSCOPY Right     low back disc surgery      in [place]    TUBAL LIGATION       VAGINAL DELIVERY  1980    VAGINAL DELIVERY  1975    VENOUS ABLATION Right 09/18/2020    Anterior Shin Varithena Ablation performed by DR. Cesar Dumont.    VENOUS ABLATION Left 09/11/2020    Lower Anterior Shin Varithen Ablation performed by Dr. Cesar Dumont.           Medications and Allergies     Medications  Outpatient Medications Marked as Taking for the 4/12/24 encounter (Office Visit) with Steven Larson FNP   Medication Sig Dispense Refill    aspirin (ECOTRIN) 81 MG EC tablet Take 81 mg by mouth once daily.      calcium carb and citrate-vitD3 600 mg calcium- 500 unit TbSR Take 1 tablet by mouth twice a week.      docusate sodium (COLACE) 100 MG capsule Take 1 capsule by mouth once daily.      ergocalciferol (ERGOCALCIFEROL) 50,000 unit Cap Take 1 capsule (50,000 Units total) by mouth every 7 days. 12 capsule 1    fluticasone propionate (FLOVENT DISKUS) 50 mcg/actuation DsDv Inhale 1 spray into the lungs 2 (two) times a day. Controller 3 each 1    gabapentin (NEURONTIN) 300 MG capsule Take 1 capsule (300 mg total) by mouth 3 (three) times daily as needed (Neuropathy). 90 capsule 1    garlic 1,000 mg Cap Take 1 capsule by mouth once daily.      hydrOXYzine pamoate (VISTARIL) 25 MG Cap TAKE 1 CAPSULE BY MOUTH 2 TIMES A DAY AS NEEDED FOR ITCHING 180 capsule 1    ibuprofen (ADVIL,MOTRIN) 800 MG tablet Take 1 tablet (800 mg total) by mouth every 12 (twelve) hours as needed for Pain. 90 tablet 1    loratadine (CLARITIN) 10 mg tablet Take 1 tablet (10 mg total) by mouth once daily. 90 tablet 1    meloxicam (MOBIC) 15 MG tablet Take 1 tablet (15 mg total) by mouth once daily. 90 tablet 1    mupirocin (BACTROBAN) 2 % ointment Apply topically 3 (three) times daily. 22 g 0    olmesartan-hydrochlorothiazide (BENICAR HCT) 20-12.5 mg per tablet Take 1 tablet by mouth once daily. 90 tablet 0    olopatadine (PATANOL) 0.1 % ophthalmic solution Place 1 drop into both eyes 2 (two) times daily.      oxybutynin (DITROPAN) 5 MG  Tab Take 1 tablet (5 mg total) by mouth 3 (three) times daily. 90 tablet 3    potassium chloride (KLOR-CON) 10 MEQ TbSR Take 10 mEq by mouth once daily.      rosuvastatin (CRESTOR) 10 MG tablet Take 1 tablet (10 mg total) by mouth once daily. 90 tablet 3    tiZANidine (ZANAFLEX) 4 MG tablet Take 1 tablet by mouth every 8 hours as needed for muscle spasms. 30 tablet 2    triamcinolone acetonide 0.1% (KENALOG) 0.1 % cream Apply topically 2 (two) times daily. 28 g 2    [DISCONTINUED] traMADoL (ULTRAM) 50 mg tablet Take 1 tablet (50 mg total) by mouth every 12 (twelve) hours as needed for Pain. 12 tablet 0       Allergies  Review of patient's allergies indicates:  No Known Allergies    Physical Examination     Vitals:    04/12/24 1700   BP: (!) 158/77   Pulse: 75   Resp: 20   Temp: 97.4 °F (36.3 °C)     Physical Exam  Vitals and nursing note reviewed.   Constitutional:       General: She is awake.      Appearance: Normal appearance.   HENT:      Head: Normocephalic.      Right Ear: Tympanic membrane, ear canal and external ear normal.      Left Ear: Tympanic membrane, ear canal and external ear normal.      Nose: Nose normal.      Mouth/Throat:      Lips: Pink.      Mouth: Mucous membranes are moist.      Pharynx: Oropharynx is clear. Uvula midline.   Cardiovascular:      Rate and Rhythm: Normal rate and regular rhythm.      Heart sounds: Normal heart sounds, S1 normal and S2 normal.   Pulmonary:      Effort: Pulmonary effort is normal. No respiratory distress.      Breath sounds: Normal breath sounds. No decreased breath sounds, wheezing, rhonchi or rales.   Abdominal:      General: Bowel sounds are normal.      Palpations: Abdomen is soft.      Tenderness: There is no abdominal tenderness.   Musculoskeletal:      Cervical back: Normal range of motion.   Skin:     General: Skin is warm.      Capillary Refill: Capillary refill takes less than 2 seconds.   Neurological:      Mental Status: She is alert and oriented to  person, place, and time.   Psychiatric:         Thought Content: Thought content does not include homicidal or suicidal ideation. Thought content does not include homicidal or suicidal plan.               Procedures   Assessment and Plan (including Health Maintenance)   :    Plan:     Problem List Items Addressed This Visit          Renal/    Dysuria - Primary    Current Assessment & Plan     UA obtained and urine culture sent. Will call pt with results and any new orders. Rocephin IM. Macrobid RX. Medication instructions given with understanding voiced. Increase fluids, cranberry juice, good hygiene.          Relevant Orders    POCT URINALYSIS W/O SCOPE (Completed)    Urine culture (Completed)    Urinary frequency    Relevant Orders    POCT URINALYSIS W/O SCOPE (Completed)    Urine culture (Completed)       Health Maintenance Topics with due status: Not Due       Topic Last Completion Date    DEXA Scan 02/02/2023    Lipid Panel 05/03/2024       Future Appointments   Date Time Provider Department Center   10/1/2024  9:00 AM [facility] [place]   11/6/2024 10:20 AM Nikki Bridges, NP [facility] [place]   5/8/2025 10:00 AM AWV NURSE [facility] [place]        Health Maintenance Due   Topic Date Due    TETANUS VACCINE  Never done    RSV Vaccine (Age 60+ and Pregnant patients) (1 - 1-dose 60+ series) Never done    Shingles Vaccine (2 of 3) 12/10/2013    COVID-19 Vaccine (6 - 2023-24 season) 09/01/2023    Mammogram  11/14/2023        Follow up if symptoms worsen or fail to improve.     Signature:  SHARON CUETO  [facility]  [id number] [place] [address]  [id number]    Date of encounter: 4/12/24

## 2024-04-14 LAB — UA COMPLETE W REFLEX CULTURE PNL UR: ABNORMAL

## 2024-04-15 ENCOUNTER — TELEPHONE (OUTPATIENT)
Dept: FAMILY MEDICINE | Facility: CLINIC | Age: 74
End: 2024-04-15
Payer: COMMERCIAL

## 2024-04-15 DIAGNOSIS — N30.00 ACUTE CYSTITIS WITHOUT HEMATURIA: Primary | ICD-10-CM

## 2024-04-15 RX ORDER — CIPROFLOXACIN 500 MG/1
500 TABLET ORAL 2 TIMES DAILY
Qty: 14 TABLET | Refills: 0 | Status: SHIPPED | OUTPATIENT
Start: 2024-04-15 | End: 2024-04-22

## 2024-04-15 NOTE — TELEPHONE ENCOUNTER
Called patient, not at home, asked that she return our call related to labwork.  Culture did show UTI, Macrobid is resistant, will send in Cipro to pharmacy.  RTC with new, worsening or persistent symptoms.

## 2024-04-25 ENCOUNTER — OFFICE VISIT (OUTPATIENT)
Dept: FAMILY MEDICINE | Facility: CLINIC | Age: 74
End: 2024-04-25
Payer: COMMERCIAL

## 2024-04-25 VITALS
TEMPERATURE: 98 F | WEIGHT: 166.81 LBS | RESPIRATION RATE: 18 BRPM | HEIGHT: 65 IN | SYSTOLIC BLOOD PRESSURE: 129 MMHG | HEART RATE: 70 BPM | BODY MASS INDEX: 27.79 KG/M2 | OXYGEN SATURATION: 97 % | DIASTOLIC BLOOD PRESSURE: 82 MMHG

## 2024-04-25 DIAGNOSIS — I10 HYPERTENSION, UNSPECIFIED TYPE: ICD-10-CM

## 2024-04-25 DIAGNOSIS — J01.40 ACUTE NON-RECURRENT PANSINUSITIS: Primary | ICD-10-CM

## 2024-04-25 DIAGNOSIS — R42 DIZZINESS: ICD-10-CM

## 2024-04-25 DIAGNOSIS — N76.0 ACUTE VAGINITIS: ICD-10-CM

## 2024-04-25 PROCEDURE — 3288F FALL RISK ASSESSMENT DOCD: CPT | Mod: ,,, | Performed by: NURSE PRACTITIONER

## 2024-04-25 PROCEDURE — 1160F RVW MEDS BY RX/DR IN RCRD: CPT | Mod: ,,, | Performed by: NURSE PRACTITIONER

## 2024-04-25 PROCEDURE — 3074F SYST BP LT 130 MM HG: CPT | Mod: ,,, | Performed by: NURSE PRACTITIONER

## 2024-04-25 PROCEDURE — 1159F MED LIST DOCD IN RCRD: CPT | Mod: ,,, | Performed by: NURSE PRACTITIONER

## 2024-04-25 PROCEDURE — 1101F PT FALLS ASSESS-DOCD LE1/YR: CPT | Mod: ,,, | Performed by: NURSE PRACTITIONER

## 2024-04-25 PROCEDURE — 1125F AMNT PAIN NOTED PAIN PRSNT: CPT | Mod: ,,, | Performed by: NURSE PRACTITIONER

## 2024-04-25 PROCEDURE — 99214 OFFICE O/P EST MOD 30 MIN: CPT | Mod: 25,,, | Performed by: NURSE PRACTITIONER

## 2024-04-25 PROCEDURE — 96372 THER/PROPH/DIAG INJ SC/IM: CPT | Mod: ,,, | Performed by: NURSE PRACTITIONER

## 2024-04-25 PROCEDURE — 3079F DIAST BP 80-89 MM HG: CPT | Mod: ,,, | Performed by: NURSE PRACTITIONER

## 2024-04-25 RX ORDER — FLUCONAZOLE 150 MG/1
TABLET ORAL
Qty: 2 TABLET | Refills: 2 | Status: SHIPPED | OUTPATIENT
Start: 2024-04-25 | End: 2024-05-03

## 2024-04-25 RX ORDER — DEXAMETHASONE SODIUM PHOSPHATE 4 MG/ML
4 INJECTION, SOLUTION INTRA-ARTICULAR; INTRALESIONAL; INTRAMUSCULAR; INTRAVENOUS; SOFT TISSUE
Status: COMPLETED | OUTPATIENT
Start: 2024-04-25 | End: 2024-04-25

## 2024-04-25 RX ORDER — AZITHROMYCIN 250 MG/1
TABLET, FILM COATED ORAL
Qty: 6 TABLET | Refills: 0 | Status: SHIPPED | OUTPATIENT
Start: 2024-04-25 | End: 2024-04-30

## 2024-04-25 RX ORDER — CEFTRIAXONE 1 G/1
1 INJECTION, POWDER, FOR SOLUTION INTRAMUSCULAR; INTRAVENOUS
Status: COMPLETED | OUTPATIENT
Start: 2024-04-25 | End: 2024-04-25

## 2024-04-25 RX ORDER — MECLIZINE HCL 12.5 MG 12.5 MG/1
12.5 TABLET ORAL 3 TIMES DAILY PRN
Qty: 60 TABLET | Refills: 1 | Status: SHIPPED | OUTPATIENT
Start: 2024-04-25

## 2024-04-25 RX ADMIN — DEXAMETHASONE SODIUM PHOSPHATE 4 MG: 4 INJECTION, SOLUTION INTRA-ARTICULAR; INTRALESIONAL; INTRAMUSCULAR; INTRAVENOUS; SOFT TISSUE at 11:04

## 2024-04-25 RX ADMIN — CEFTRIAXONE 1 G: 1 INJECTION, POWDER, FOR SOLUTION INTRAMUSCULAR; INTRAVENOUS at 11:04

## 2024-04-25 NOTE — ASSESSMENT & PLAN NOTE
Most likely related to sinusitis which we are treating. I did send in Meclizine for her to take PRN. Also encouraged her to use caution when changing positions to avoid falls.

## 2024-04-25 NOTE — PROGRESS NOTES
Nikki Bridges NP   [facility] [address]  [place]  [address]      PATIENT NAME: Delmis Shell  : 1950  DATE: 24  MRN: [id number]      Billing Provider: Nikki Bridges NP  Level of Service: [place] OFFICE/OUTPT VISIT, EST, LEVL IV, 30-39 MIN  Patient PCP Information       Provider PCP Type    Nikki Bridges NP General            Reason for Visit / Chief Complaint: Dizziness (Patient is a 74 year old female presenting with dizziness since last week.  Feels like she is off balance and just feels bad in general.  Also reports that she is feeling some pressure around her eyes.), Vaginal Itching (Reports that she had a UTI and was taking some antibiotics and started having vaginal itching.), and Health Maintenance (TETANUS VACCINE Never done---declined/RSV Vaccine (Age 60+ and Pregnant patients)(1 - 1-dose 60+ series) Never done---declined/Shingles Vaccine(2 of 3) due on 12/10/2013declined/COVID-19 Vaccine(2023- season) due on 2023---declined/Mammogram due on 2023---records requested)         History of Present Illness / Problem Focused Workflow     Patient is a 74 year old female presenting with dizziness since last week.  Feels like she is off balance and just feels bad in general.  Also reports that she is feeling some pressure around her sinuses. States she has taken Tylenol, and OTC allergy medication with no relief.   Vaginal Itching: Reports that she had a UTI and was taking some antibiotics and started having vaginal itching. She denies any vaginal discharge. States she thinks that antibiotics brought on yeast infection. She is requesting treatment for this.        Review of Systems     @Review of Systems   Constitutional:  Negative for activity change, appetite change, fatigue and fever.   HENT:  Positive for sinus pressure/congestion. Negative for nasal congestion, ear pain, rhinorrhea and sore throat.    Eyes:  Negative for pain, redness, visual  disturbance and eye dryness.   Respiratory:  Negative for cough and shortness of breath.    Cardiovascular:  Negative for chest pain and leg swelling.   Gastrointestinal:  Negative for abdominal distention, abdominal pain, constipation and diarrhea.   Endocrine: Negative for cold intolerance, heat intolerance and polyuria.   Genitourinary:  Negative for bladder incontinence, dysuria, frequency and urgency.        Vaginal itching     Musculoskeletal:  Negative for arthralgias, gait problem and myalgias.   Integumentary:  Negative for color change, rash and wound.   Allergic/Immunologic: Negative for environmental allergies and food allergies.   Neurological:  Positive for dizziness, light-headedness and headaches. Negative for weakness.   Psychiatric/Behavioral:  Negative for behavioral problems and sleep disturbance.        Medical / Social / Family History     Past Medical History:   Diagnosis Date    Arthritis     Cataract     Family history of breast cancer in sister 11/14/2022    Hyperlipidemia     Hypertension     Left hip pain     Osteoporosis     Overactive bladder     Venous insufficiency, peripheral     Vitamin D deficiency        Past Surgical History:   Procedure Laterality Date    CATARACT EXTRACTION W/ INTRAOCULAR LENS  IMPLANT, BILATERAL Bilateral     EXCISION OF BREAST LESION Right 1970    KNEE ARTHROSCOPY Right 2020    low back disc surgery  2014    in [place]    TUBAL LIGATION      VAGINAL DELIVERY  1980    VAGINAL DELIVERY  1975    VENOUS ABLATION Right 09/18/2020    Anterior Shin Varithena Ablation performed by DR. Cesar Dumont.    VENOUS ABLATION Left 09/11/2020    Lower Anterior Shin Varithen Ablation performed by Dr. Cesar Dumont.       Medications and Allergies     Medications  Current Outpatient Medications   Medication Sig Dispense Refill    aspirin (ECOTRIN) 81 MG EC tablet Take 81 mg by mouth once daily.      calcium carb and citrate-vitD3 600 mg calcium- 500 unit TbSR Take 1 tablet by  mouth twice a week.      docusate sodium (COLACE) 100 MG capsule Take 1 capsule by mouth once daily.      ergocalciferol (ERGOCALCIFEROL) 50,000 unit Cap Take 1 capsule (50,000 Units total) by mouth every 7 days. 12 capsule 1    fluticasone propionate (FLOVENT DISKUS) 50 mcg/actuation DsDv Inhale 1 spray into the lungs 2 (two) times a day. Controller 3 each 1    gabapentin (NEURONTIN) 300 MG capsule Take 1 capsule (300 mg total) by mouth 3 (three) times daily as needed (Neuropathy). 90 capsule 1    garlic 1,000 mg Cap Take 1 capsule by mouth once daily.      hydrOXYzine pamoate (VISTARIL) 25 MG Cap TAKE 1 CAPSULE BY MOUTH 2 TIMES A DAY AS NEEDED FOR ITCHING 180 capsule 1    ibuprofen (ADVIL,MOTRIN) 800 MG tablet Take 1 tablet (800 mg total) by mouth every 12 (twelve) hours as needed for Pain. 90 tablet 1    loratadine (CLARITIN) 10 mg tablet Take 1 tablet (10 mg total) by mouth once daily. 90 tablet 1    meloxicam (MOBIC) 15 MG tablet Take 1 tablet (15 mg total) by mouth once daily. 90 tablet 1    mupirocin (BACTROBAN) 2 % ointment Apply topically 3 (three) times daily. 22 g 0    olmesartan-hydrochlorothiazide (BENICAR HCT) 20-12.5 mg per tablet Take 1 tablet by mouth once daily. 90 tablet 0    olopatadine (PATANOL) 0.1 % ophthalmic solution Place 1 drop into both eyes 2 (two) times daily.      oxybutynin (DITROPAN) 5 MG Tab Take 1 tablet (5 mg total) by mouth 3 (three) times daily. 90 tablet 3    potassium chloride (KLOR-CON) 10 MEQ TbSR Take 10 mEq by mouth once daily.      rosuvastatin (CRESTOR) 10 MG tablet Take 1 tablet (10 mg total) by mouth once daily. 90 tablet 3    tiZANidine (ZANAFLEX) 4 MG tablet Take 1 tablet by mouth every 8 hours as needed for muscle spasms. 30 tablet 2    traMADoL (ULTRAM) 50 mg tablet Take 1 tablet (50 mg total) by mouth every 12 (twelve) hours as needed for Pain. 12 tablet 0    triamcinolone acetonide 0.1% (KENALOG) 0.1 % cream Apply topically 2 (two) times daily. 28 g 2     azithromycin (Z-ALEJANDRA) 250 MG tablet Take 2 tablets by mouth on day 1; Take 1 tablet by mouth on days 2-5 6 tablet 0    fluconazole (DIFLUCAN) 150 MG Tab Take on tablet on day one and then repeat in 3-5 days if needed. 2 tablet 2    meclizine (ANTIVERT) 12.5 mg tablet Take 1 tablet (12.5 mg total) by mouth 3 (three) times daily as needed for Dizziness. 60 tablet 1     No current facility-administered medications for this visit.       Allergies  Review of patient's allergies indicates:  No Known Allergies    Physical Examination     Vitals:    04/25/24 1041   BP: 129/82   Pulse:    Resp:    Temp:      Physical Exam  Vitals and nursing note reviewed.   Constitutional:       Appearance: Normal appearance.   HENT:      Head: Normocephalic.      Right Ear: A middle ear effusion is present.      Left Ear: A middle ear effusion is present.      Nose: Congestion and rhinorrhea present. Rhinorrhea is purulent.      Right Turbinates: Pale.      Left Turbinates: Pale.      Right Sinus: Maxillary sinus tenderness and frontal sinus tenderness present.      Left Sinus: Maxillary sinus tenderness and frontal sinus tenderness present.      Mouth/Throat:      Lips: Pink.      Mouth: Mucous membranes are moist.      Pharynx: Oropharyngeal exudate (clear post nasal drainage.) and posterior oropharyngeal erythema present.   Eyes:      Conjunctiva/sclera: Conjunctivae normal.   Cardiovascular:      Rate and Rhythm: Normal rate and regular rhythm.      Pulses: Normal pulses.      Heart sounds: Normal heart sounds.   Pulmonary:      Effort: Pulmonary effort is normal.      Breath sounds: Normal breath sounds. No wheezing or rhonchi.   Abdominal:      General: Abdomen is flat. Bowel sounds are normal. There is no distension.      Palpations: Abdomen is soft.      Tenderness: There is no abdominal tenderness.   Genitourinary:     Comments: Declines  exam. Denies discharge or sores, does report itching.   Musculoskeletal:         General:  Normal range of motion.      Cervical back: Normal range of motion.   Skin:     General: Skin is warm and dry.      Capillary Refill: Capillary refill takes less than 2 seconds.   Neurological:      General: No focal deficit present.      Mental Status: She is alert and oriented to person, place, and time. Mental status is at baseline.   Psychiatric:         Mood and Affect: Mood normal.         Behavior: Behavior normal.               Lab Results   Component Value Date    WBC 4.89 12/12/2022    HGB 13.4 12/12/2022    HCT 51.2 (H) 12/12/2022    .3 (H) 12/12/2022     12/12/2022        CMP  Sodium   Date Value Ref Range Status   01/25/2023 139 136 - 145 mmol/L Final     Potassium   Date Value Ref Range Status   01/25/2023 4.3 3.5 - 5.1 mmol/L Final     Chloride   Date Value Ref Range Status   01/25/2023 104 98 - 107 mmol/L Final     CO2   Date Value Ref Range Status   01/25/2023 31 21 - 32 mmol/L Final     Glucose   Date Value Ref Range Status   01/25/2023 93 74 - 106 mg/dL Final     BUN   Date Value Ref Range Status   01/25/2023 19 (H) 7 - 18 mg/dL Final     Creatinine   Date Value Ref Range Status   01/25/2023 1.04 (H) 0.55 - 1.02 mg/dL Final     Calcium   Date Value Ref Range Status   03/28/2024 9.8 8.5 - 10.1 mg/dL Final     Total Protein   Date Value Ref Range Status   01/25/2023 7.5 6.4 - 8.2 g/dL Final     Albumin   Date Value Ref Range Status   01/25/2023 3.4 (L) 3.5 - 5.0 g/dL Final     Bilirubin, Total   Date Value Ref Range Status   01/25/2023 0.4 >0.0 - 1.2 mg/dL Final     Alk Phos   Date Value Ref Range Status   01/25/2023 61 55 - 142 U/L Final     AST   Date Value Ref Range Status   01/25/2023 23 15 - 37 U/L Final     ALT   Date Value Ref Range Status   01/25/2023 35 13 - 56 U/L Final     Anion Gap   Date Value Ref Range Status   01/25/2023 8 7 - 16 mmol/L Final     eGFR   Date Value Ref Range Status   01/25/2023 57 (L) >=60 mL/min/1.73m² Final     Procedures   Assessment and Plan  (including Health Maintenance)   :    Plan:     Problem List Items Addressed This Visit          ENT    Acute non-recurrent pansinusitis - Primary    Current Assessment & Plan     Rocephin and Decadron given IM in clinic.   Zithromax as ordered and Meclizine as needed. She also reports she has Flonase at home and was encouraged to use this as well.     Reviewed pathology of current symptoms, medication side effects/risk/benefits/directions on taking medications, and worsening or persistent symptoms that require follow up in next 2-3 days. Saline/steroid nasal sprays, antihistamine use, increase fluid intake, and multivitamin/elderberry/Zinc use were recommended. May take Tylenol or Motrin as needed for pain and/or fever. Patient was instructed to take antibiotic as directed, complete entire course to avoid antibiotic resistance, and take OTC probiotic with antibiotic to prevent GI upset. Patient verbalized understanding of treatment plan and denies any questions.            Cardiac/Vascular    Hypertension    Current Assessment & Plan     Blood pressure well controlled. Continue current medications. Follow up in 3 months or as needed. Orthostatic vitals were obtained at today's visit to rule out orthostatic hypotension and they were stable.               Renal/    Acute vaginitis       Other    Dizziness    Current Assessment & Plan     Most likely related to sinusitis which we are treating. I did send in Meclizine for her to take PRN. Also encouraged her to use caution when changing positions to avoid falls.             Health Maintenance Topics with due status: Not Due       Topic Last Completion Date    Lipid Panel 01/25/2023    DEXA Scan 02/02/2023       Future Appointments   Date Time Provider Department Center   5/3/2024 10:00 AM [facility] NURSE [facility]   10/1/2024  9:00 AM [facility], [facility]        Health Maintenance Due   Topic Date Due    TETANUS VACCINE   Never done    RSV Vaccine (Age 60+ and Pregnant patients) (1 - 1-dose 60+ series) Never done    Shingles Vaccine (2 of 3) 12/10/2013    COVID-19 Vaccine (6 - 2023-24 season) 09/01/2023    Mammogram  11/14/2023          Signature:  Nikki Bridges NP  [address]  [id number]    Date of encounter: 4/25/24

## 2024-04-25 NOTE — ASSESSMENT & PLAN NOTE
Blood pressure well controlled. Continue current medications. Follow up in 3 months or as needed. Orthostatic vitals were obtained at today's visit to rule out orthostatic hypotension and they were stable.

## 2024-04-25 NOTE — Clinical Note
Patient reports having a mammogram done at [facility] in August or September of 2023.  Please obtain records.

## 2024-04-25 NOTE — ASSESSMENT & PLAN NOTE
Rocephin and Decadron given IM in clinic.   Zithromax as ordered and Meclizine as needed. She also reports she has Flonase at home and was encouraged to use this as well.     Reviewed pathology of current symptoms, medication side effects/risk/benefits/directions on taking medications, and worsening or persistent symptoms that require follow up in next 2-3 days. Saline/steroid nasal sprays, antihistamine use, increase fluid intake, and multivitamin/elderberry/Zinc use were recommended. May take Tylenol or Motrin as needed for pain and/or fever. Patient was instructed to take antibiotic as directed, complete entire course to avoid antibiotic resistance, and take OTC probiotic with antibiotic to prevent GI upset. Patient verbalized understanding of treatment plan and denies any questions.

## 2024-04-29 ENCOUNTER — OFFICE VISIT (OUTPATIENT)
Dept: FAMILY MEDICINE | Facility: CLINIC | Age: 74
End: 2024-04-29
Payer: COMMERCIAL

## 2024-04-29 VITALS
RESPIRATION RATE: 18 BRPM | HEIGHT: 65 IN | HEART RATE: 80 BPM | DIASTOLIC BLOOD PRESSURE: 74 MMHG | TEMPERATURE: 98 F | BODY MASS INDEX: 27.76 KG/M2 | SYSTOLIC BLOOD PRESSURE: 123 MMHG | WEIGHT: 166.63 LBS | OXYGEN SATURATION: 95 %

## 2024-04-29 DIAGNOSIS — L60.0 INGROWN TOENAIL WITHOUT INFECTION: Primary | ICD-10-CM

## 2024-04-29 PROCEDURE — 1159F MED LIST DOCD IN RCRD: CPT | Mod: ,,, | Performed by: NURSE PRACTITIONER

## 2024-04-29 PROCEDURE — 3074F SYST BP LT 130 MM HG: CPT | Mod: ,,, | Performed by: NURSE PRACTITIONER

## 2024-04-29 PROCEDURE — 3078F DIAST BP <80 MM HG: CPT | Mod: ,,, | Performed by: NURSE PRACTITIONER

## 2024-04-29 PROCEDURE — 99212 OFFICE O/P EST SF 10 MIN: CPT | Mod: ,,, | Performed by: NURSE PRACTITIONER

## 2024-04-29 PROCEDURE — 1101F PT FALLS ASSESS-DOCD LE1/YR: CPT | Mod: ,,, | Performed by: NURSE PRACTITIONER

## 2024-04-29 PROCEDURE — 3288F FALL RISK ASSESSMENT DOCD: CPT | Mod: ,,, | Performed by: NURSE PRACTITIONER

## 2024-04-29 PROCEDURE — 1125F AMNT PAIN NOTED PAIN PRSNT: CPT | Mod: ,,, | Performed by: NURSE PRACTITIONER

## 2024-04-29 NOTE — ASSESSMENT & PLAN NOTE
Right great toenail has been completely removed. There is a piece of petrolatum gauze stuck to nail bed. We soaked foot in warm water and then removed gauze from nailbed. No bleeding. Patient tolerated well. Applied ELENA and non adherent dressing. Instructed to follow instructions given by podiatry and follow up as needed.

## 2024-04-29 NOTE — PROGRESS NOTES
Nikki Bridges NP   [facility] [place] [address]  [place]  [address]      PATIENT NAME: Delmis Shell  : 1950  DATE: 24  MRN: [id number]      Billing Provider: Nikki Bridges NP  Level of Service: RI OFFICE/OUTPT VISIT, EST, LEVL II, 10-19 MIN  Patient PCP Information       Provider PCP Type    Nikki Bridges NP General            Reason for Visit / Chief Complaint: Toe Pain (Patient is a 74 year old female presenting with part of a dressing that is stuck to the skin of her right big toe.  Had the toenail removed Friday and tried to take the dressing off today as instructed and was unable to get part of the dressing off.  Reports that she soaked her foot and even put it in a whirlpool and it will not come off.) and Health Maintenance (TETANUS VACCINE Never done---declined/RSV Vaccine (Age 60+ and Pregnant patients)(1 - 1-dose 60+ series) Never done---declined/Shingles Vaccine(2 of 3) due on 12/10/2013---declined/COVID-19 Vaccine(2023- season) due on 2023---declined/Mammogram due on 2023---declined)         History of Present Illness / Problem Focused Workflow      74 year old female presenting with part of a dressing that is stuck to the skin of her right big toe.  Had the toenail removed Friday and tried to take the dressing off today as instructed and was unable to get part of the dressing off.  Reports that she soaked her foot and even put it in a whirlpool and it will not come off.        Review of Systems     @Review of Systems   Constitutional:  Negative for activity change, appetite change, fatigue and fever.   HENT:  Negative for nasal congestion, ear pain, rhinorrhea, sinus pressure/congestion and sore throat.    Eyes:  Negative for pain, redness, visual disturbance and eye dryness.   Respiratory:  Negative for cough and shortness of breath.    Cardiovascular:  Negative for chest pain and leg swelling.   Gastrointestinal:  Negative for abdominal  distention, abdominal pain, constipation and diarrhea.   Endocrine: Negative for cold intolerance, heat intolerance and polyuria.   Genitourinary:  Negative for bladder incontinence, dysuria, frequency and urgency.   Musculoskeletal:  Negative for arthralgias, gait problem and myalgias.   Integumentary:  Positive for wound. Negative for color change and rash.   Allergic/Immunologic: Negative for environmental allergies and food allergies.   Neurological:  Negative for dizziness, weakness, light-headedness and headaches.   Psychiatric/Behavioral:  Negative for behavioral problems and sleep disturbance.        Medical / Social / Family History     Past Medical History:   Diagnosis Date    Arthritis     Cataract     Family history of breast cancer in sister 11/14/2022    Hyperlipidemia     Hypertension     Left hip pain     Osteoporosis     Overactive bladder     Venous insufficiency, peripheral     Vitamin D deficiency        Past Surgical History:   Procedure Laterality Date    CATARACT EXTRACTION W/ INTRAOCULAR LENS  IMPLANT, BILATERAL Bilateral     EXCISION OF BREAST LESION Right 1970    KNEE ARTHROSCOPY Right 2020    low back disc surgery  2014    in [place]    TUBAL LIGATION      VAGINAL DELIVERY  1980    VAGINAL DELIVERY  1975    VENOUS ABLATION Right 09/18/2020    Anterior Shin Varithena Ablation performed by DR. Cesar Dumont.    VENOUS ABLATION Left 09/11/2020    Lower Anterior Shin Varithen Ablation performed by Dr. Cesar Dumont.       Medications and Allergies     Medications  Current Outpatient Medications   Medication Sig Dispense Refill    aspirin (ECOTRIN) 81 MG EC tablet Take 81 mg by mouth once daily.      azithromycin (Z-ALEJANDRA) 250 MG tablet Take 2 tablets by mouth on day 1; Take 1 tablet by mouth on days 2-5 6 tablet 0    calcium carb and citrate-vitD3 600 mg calcium- 500 unit TbSR Take 1 tablet by mouth twice a week.      docusate sodium (COLACE) 100 MG capsule Take 1 capsule by mouth once daily.       ergocalciferol (ERGOCALCIFEROL) 50,000 unit Cap Take 1 capsule (50,000 Units total) by mouth every 7 days. 12 capsule 1    fluconazole (DIFLUCAN) 150 MG Tab Take on tablet on day one and then repeat in 3-5 days if needed. 2 tablet 2    fluticasone propionate (FLOVENT DISKUS) 50 mcg/actuation DsDv Inhale 1 spray into the lungs 2 (two) times a day. Controller 3 each 1    gabapentin (NEURONTIN) 300 MG capsule Take 1 capsule (300 mg total) by mouth 3 (three) times daily as needed (Neuropathy). 90 capsule 1    garlic 1,000 mg Cap Take 1 capsule by mouth once daily.      hydrOXYzine pamoate (VISTARIL) 25 MG Cap TAKE 1 CAPSULE BY MOUTH 2 TIMES A DAY AS NEEDED FOR ITCHING 180 capsule 1    ibuprofen (ADVIL,MOTRIN) 800 MG tablet Take 1 tablet (800 mg total) by mouth every 12 (twelve) hours as needed for Pain. 90 tablet 1    loratadine (CLARITIN) 10 mg tablet Take 1 tablet (10 mg total) by mouth once daily. 90 tablet 1    meclizine (ANTIVERT) 12.5 mg tablet Take 1 tablet (12.5 mg total) by mouth 3 (three) times daily as needed for Dizziness. 60 tablet 1    meloxicam (MOBIC) 15 MG tablet Take 1 tablet (15 mg total) by mouth once daily. 90 tablet 1    mupirocin (BACTROBAN) 2 % ointment Apply topically 3 (three) times daily. 22 g 0    olmesartan-hydrochlorothiazide (BENICAR HCT) 20-12.5 mg per tablet Take 1 tablet by mouth once daily. 90 tablet 0    olopatadine (PATANOL) 0.1 % ophthalmic solution Place 1 drop into both eyes 2 (two) times daily.      oxybutynin (DITROPAN) 5 MG Tab Take 1 tablet (5 mg total) by mouth 3 (three) times daily. 90 tablet 3    potassium chloride (KLOR-CON) 10 MEQ TbSR Take 10 mEq by mouth once daily.      rosuvastatin (CRESTOR) 10 MG tablet Take 1 tablet (10 mg total) by mouth once daily. 90 tablet 3    tiZANidine (ZANAFLEX) 4 MG tablet Take 1 tablet by mouth every 8 hours as needed for muscle spasms. 30 tablet 2    traMADoL (ULTRAM) 50 mg tablet Take 1 tablet (50 mg total) by mouth every 12 (twelve)  hours as needed for Pain. 12 tablet 0    triamcinolone acetonide 0.1% (KENALOG) 0.1 % cream Apply topically 2 (two) times daily. 28 g 2     No current facility-administered medications for this visit.       Allergies  Review of patient's allergies indicates:  No Known Allergies    Physical Examination     Vitals:    04/29/24 1634   BP: 123/74   Pulse: 80   Resp: 18   Temp: 97.6 °F (36.4 °C)     Physical Exam  Vitals and nursing note reviewed.   HENT:      Head: Normocephalic.      Right Ear: Tympanic membrane normal.      Left Ear: Tympanic membrane normal.      Nose: Nose normal.      Mouth/Throat:      Mouth: Mucous membranes are moist.      Pharynx: Oropharynx is clear. No posterior oropharyngeal erythema.   Eyes:      Conjunctiva/sclera: Conjunctivae normal.   Cardiovascular:      Rate and Rhythm: Normal rate and regular rhythm.      Pulses: Normal pulses.           Dorsalis pedis pulses are 2+ on the right side and 2+ on the left side.        Posterior tibial pulses are 2+ on the right side and 2+ on the left side.      Heart sounds: Normal heart sounds.   Pulmonary:      Effort: Pulmonary effort is normal.      Breath sounds: Normal breath sounds.   Abdominal:      General: Abdomen is flat. Bowel sounds are normal. There is no distension.      Palpations: Abdomen is soft.   Musculoskeletal:         General: No swelling or tenderness. Normal range of motion.      Cervical back: Normal range of motion.      Right lower leg: No edema.      Left lower leg: No edema.   Feet:      Comments: Right great toenail has been completely removed. There is a piece of petrolatum gauze stuck to nail bed.   Skin:     General: Skin is warm and dry.      Capillary Refill: Capillary refill takes less than 2 seconds.      Comments: Right great toenail has been completely removed. There is a piece of petrolatum gauze stuck to nail bed.    Neurological:      Mental Status: She is alert. Mental status is at baseline.   Psychiatric:          Mood and Affect: Mood normal.         Behavior: Behavior normal.               Lab Results   Component Value Date    WBC 4.89 12/12/2022    HGB 13.4 12/12/2022    HCT 51.2 (H) 12/12/2022    .3 (H) 12/12/2022     12/12/2022        CMP  Sodium   Date Value Ref Range Status   01/25/2023 139 136 - 145 mmol/L Final     Potassium   Date Value Ref Range Status   01/25/2023 4.3 3.5 - 5.1 mmol/L Final     Chloride   Date Value Ref Range Status   01/25/2023 104 98 - 107 mmol/L Final     CO2   Date Value Ref Range Status   01/25/2023 31 21 - 32 mmol/L Final     Glucose   Date Value Ref Range Status   01/25/2023 93 74 - 106 mg/dL Final     BUN   Date Value Ref Range Status   01/25/2023 19 (H) 7 - 18 mg/dL Final     Creatinine   Date Value Ref Range Status   01/25/2023 1.04 (H) 0.55 - 1.02 mg/dL Final     Calcium   Date Value Ref Range Status   03/28/2024 9.8 8.5 - 10.1 mg/dL Final     Total Protein   Date Value Ref Range Status   01/25/2023 7.5 6.4 - 8.2 g/dL Final     Albumin   Date Value Ref Range Status   01/25/2023 3.4 (L) 3.5 - 5.0 g/dL Final     Bilirubin, Total   Date Value Ref Range Status   01/25/2023 0.4 >0.0 - 1.2 mg/dL Final     Alk Phos   Date Value Ref Range Status   01/25/2023 61 55 - 142 U/L Final     AST   Date Value Ref Range Status   01/25/2023 23 15 - 37 U/L Final     ALT   Date Value Ref Range Status   01/25/2023 35 13 - 56 U/L Final     Anion Gap   Date Value Ref Range Status   01/25/2023 8 7 - 16 mmol/L Final     eGFR   Date Value Ref Range Status   01/25/2023 57 (L) >=60 mL/min/1.73m² Final     Procedures   Assessment and Plan (including Health Maintenance)   :    Plan:     Problem List Items Addressed This Visit          Derm    Ingrown toenail without infection - Primary    Current Assessment & Plan     Right great toenail has been completely removed. There is a piece of petrolatum gauze stuck to nail bed. We soaked foot in warm water and then removed gauze from nailbed. No bleeding.  Patient tolerated well. Applied ELENA and non adherent dressing. Instructed to follow instructions given by podiatry and follow up as needed.               Health Maintenance Topics with due status: Not Due       Topic Last Completion Date    Lipid Panel 01/25/2023    DEXA Scan 02/02/2023       Future Appointments   Date Time Provider Department Center   5/3/2024 10:00 [facility]   10/1/2024  9:00 AM [facility], [facility] Sebastien DUNN        Health Maintenance Due   Topic Date Due    TETANUS VACCINE  Never done    RSV Vaccine (Age 60+ and Pregnant patients) (1 - 1-dose 60+ series) Never done    Shingles Vaccine (2 of 3) 12/10/2013    COVID-19 Vaccine (6 - 2023-24 season) 09/01/2023    Mammogram  11/14/2023          Signature:  Nikki Bridges NP  [facility]  [address] [address]  [address]    Date of encounter: 4/29/24

## 2024-04-30 ENCOUNTER — EXTERNAL CHRONIC CARE MANAGEMENT (OUTPATIENT)
Dept: FAMILY MEDICINE | Facility: CLINIC | Age: 74
End: 2024-04-30
Payer: COMMERCIAL

## 2024-04-30 PROCEDURE — G0511 CCM/BHI BY RHC/FQHC 20MIN MO: HCPCS | Mod: ,,, | Performed by: NURSE PRACTITIONER

## 2024-05-01 ENCOUNTER — PATIENT OUTREACH (OUTPATIENT)
Dept: ADMINISTRATIVE | Facility: HOSPITAL | Age: 74
End: 2024-05-01
Payer: COMMERCIAL

## 2024-05-01 NOTE — PROGRESS NOTES
Population Health Chart Review & Patient Outreach Details      Further Action Needed If Patient Returns Outreach:      24 BULL sent to [facility] for recent mammogram report TC      Updates Requested / Reviewed:     []  Care Everywhere    [x]     []  External Sources ([facility], [facility], [facility], etc.)    [] [facility]   [] [facility]   [] Other:    []  Care Team Updated   []  Removed  or Duplicate Orders   []  Immunization Reconciliation Completed / Queried    [] [place]   [] [place]   [] [place]   [] [place]      Health Maintenance Topics Addressed and Outreach Outcomes / Actions Taken:             Breast Cancer Screening []  Mammogram Order Placed    []  Mammogram Screening Scheduled    [x]  External Records Requested & Care Team Updated if Applicable    []  External Records Uploaded & Care Team Updated if Applicable    []  Pt Declined Scheduling Mammogram    []  Pt Will Schedule with External Provider / Order Routed & Care Team Updated if Applicable              Cervical Cancer Screening []  Pap Smear Scheduled in Primary Care or OBGYN    []  External Records Requested & Care Team Updated if Applicable       []  External Records Uploaded, Care Team Updated, & History Updated if Applicable    []  Patient Declined Scheduling Pap Smear    []  Patient Will Schedule with External Provider & Care Team Updated if Applicable                  Colorectal Cancer Screening []  Colonoscopy Case Request / Referral / Home Test Order Placed    []  External Records Requested & Care Team Updated if Applicable    []  External Records Uploaded, Care Team Updated, & History Updated if Applicable    []  Patient Declined Completing Colon Cancer Screening    []  Patient Will Schedule with External Provider & Care Team Updated if Applicable    []  Fit Kit Mailed (add the SmartPhrase under additional notes)    []  Reminded Patient to Complete Home Test                Diabetic Eye Exam []  Eye Exam Screening Order  Placed    []  Eye Camera Scheduled or Optometry/Ophthalmology Referral Placed    []  External Records Requested & Care Team Updated if Applicable    []  External Records Uploaded, Care Team Updated, & History Updated if Applicable    []  Patient Declined Scheduling Eye Exam    []  Patient Will Schedule with External Provider & Care Team Updated if Applicable             Blood Pressure Control []  Primary Care Follow Up Visit Scheduled     []  Remote Blood Pressure Reading Captured    []  Patient Declined Remote Reading or Scheduling Appt - Escalated to PCP    []  Patient Will Call Back or Send Portal Message with Reading                 HbA1c & Other Labs []  Overdue Lab(s) Ordered    []  Overdue Lab(s) Scheduled    []  External Records Uploaded & Care Team Updated if Applicable    []  Primary Care Follow Up Visit Scheduled     []  Reminded Patient to Complete A1c Home Test    []  Patient Declined Scheduling Labs or Will Call Back to Schedule    []  Patient Will Schedule with External Provider / Order Routed, & Care Team Updated if Applicable           Primary Care Appointment []  Primary Care Appt Scheduled    []  Patient Declined Scheduling or Will Call Back to Schedule    []  Pt Established with External Provider, Updated Care Team, & Informed Pt to Notify Payor if Applicable           Medication Adherence /    Statin Use []  Primary Care Appointment Scheduled    []  Patient Reminded to  Prescription    []  Patient Declined, Provider Notified if Needed    []  Sent Provider Message to Review to Evaluate Pt for Statin, Add Exclusion Dx Codes, Document   Exclusion in Problem List, Change Statin Intensity Level to Moderate or High Intensity if Applicable                Osteoporosis Screening []  Dexa Order Placed    []  Dexa Appointment Scheduled    []  External Records Requested & Care Team Updated    []  External Records Uploaded, Care Team Updated, & History Updated if Applicable    []  Patient Declined  Scheduling Dexa or Will Call Back to Schedule    []  Patient Will Schedule with External Provider / Order Routed & Care Team Updated if Applicable       Additional Notes:

## 2024-05-01 NOTE — PROGRESS NOTES
"  Delmis Shell presented for a  Medicare AWV and comprehensive Health Risk Assessment today. The following components were reviewed and updated:    Medical history  Family History  Social history  Allergies and Current Medications  Health Risk Assessment  Health Maintenance  Care Team         ** See Completed Assessments for Annual Wellness Visit within the encounter summary.**         The following assessments were completed:  Living Situation  CAGE  Depression Screening  Timed Get Up and Go  Whisper Test  Cognitive Function Screening  Nutrition Screening  ADL Screening  PAQ Screening        Patient no longer takes Tramadol.     In addition to narcotic pain medications, patient is also using Ibuprofen for pain control.       Patient is followed by a specialist currently for their pain and will not be referred today.       Patient's opioid risk potential based on ORT-OUD tool:       Nilay each box that applies   No   Yes     Family history of substance abuse   Alcohol [x] []   Illegal drugs [x] []   Rx drugs [x] []     Personal history of substance abuse   Alcohol [x] []   Illegal drugs [x] []   Rx drugs [x] []     Age between 16-45 years   [x]   []     Patient with ADD, OCD, Bipolar disorder, schizoprenia   [x]   []     Patient with depression   [x]   []                         Scoring total                                                                 Non-opioid treatment options have been discussed today and added to the patient's after visit summary.                   Vitals:    05/03/24 1010   BP: 130/72   BP Location: Left arm   Patient Position: Sitting   Pulse: 62   Resp: 20   Temp: 98 °F (36.7 °C)   SpO2: 97%   Weight: 76.2 kg (168 lb)   Height: 5' 5" (1.651 m)     Body mass index is 27.96 kg/m².  Physical Exam  Vitals and nursing note reviewed.   HENT:      Head: Normocephalic.      Right Ear: Tympanic membrane normal.      Left Ear: Tympanic membrane normal.      Nose: Nose normal.      Mouth/Throat: "      Mouth: Mucous membranes are moist.      Pharynx: Oropharynx is clear. No posterior oropharyngeal erythema.   Eyes:      Conjunctiva/sclera: Conjunctivae normal.   Cardiovascular:      Rate and Rhythm: Normal rate and regular rhythm.      Pulses: Normal pulses.      Heart sounds: Normal heart sounds.   Pulmonary:      Effort: Pulmonary effort is normal.      Breath sounds: Normal breath sounds.   Abdominal:      General: Abdomen is flat. Bowel sounds are normal. There is no distension.      Palpations: Abdomen is soft.   Musculoskeletal:         General: No swelling or tenderness. Normal range of motion.      Cervical back: Normal range of motion.      Right lower leg: No edema.      Left lower leg: No edema.   Skin:     General: Skin is warm and dry.      Capillary Refill: Capillary refill takes less than 2 seconds.   Neurological:      Mental Status: She is alert. Mental status is at baseline.   Psychiatric:         Mood and Affect: Mood normal.         Behavior: Behavior normal.             Diagnoses and health risks identified today and associated recommendations/orders:    Problem List Items Addressed This Visit          Neuro    Neuropathic pain     Symptoms controlled well. Gabapentin and ibuprofen refilled today            Cardiac/Vascular    Chronic venous insufficiency of lower extremity     Currently well controlled.          Hypertension     Blood pressure well controlled. Continue current medications. Follow up in 3 months or as needed.            Relevant Orders    Lipid Panel    CBC Auto Differential    Comprehensive Metabolic Panel       Endocrine    Age-related osteoporosis without current pathological fracture     Continue Prolia injections as scheduled. Yearly DXA scans. Follow up on as needed basis.          Vitamin D deficiency     Refilled Ergocalciferol. Continue at current dosage.           Other Visit Diagnoses       Encounter for initial annual wellness visit (AWV) in Medicare patient     -  Primary    BMI 27.0-27.9,adult                Provided Delmis with a 5-10 year written screening schedule and personal prevention plan. Recommendations were developed using the USPSTF age appropriate recommendations. Education, counseling, and referrals were provided as needed. After Visit Summary printed and given to patient which includes a list of additional screenings\tests needed.    Follow up for yearly annual wellness visit  Pt reports [facility]. Will request records.    Nikki Bridges NP    I offered to discuss advanced care planning, including how to pick a person who would make decisions for you if you were unable to make them for yourself, called a health care power of , and what kind of decisions you might make such as use of life sustaining treatments such as ventilators and tube feeding when faced with a life limiting illness recorded on a living will that they will need to know. (How you want to be cared for as you near the end of your natural life)     X Patient is interested in learning more about how to make advanced directives.  I provided them paperwork and offered to discuss this with them.

## 2024-05-01 NOTE — PATIENT INSTRUCTIONS
Counseling and Referral of Other Preventative  (Italic type indicates deductible and co-insurance are waived)    Patient Name: Delmis Shell  Today's Date: 5/3/2024    Health Maintenance       Date Due Completion Date    TETANUS VACCINE Never done ---    RSV Vaccine (Age 60+ and Pregnant patients) (1 - 1-dose 60+ series) Never done ---    Shingles Vaccine (2 of 3) 12/10/2013 10/15/2013    COVID-19 Vaccine (6 - 2023-24 season) 09/01/2023 1/31/2023    Mammogram 11/14/2023 11/14/2022    Override on 10/31/2020: Done ([facility])    DEXA Scan 02/02/2025 2/2/2023    Override on 12/2/2019: Done ([facility])    Lipid Panel 01/25/2028 1/25/2023        No orders of the defined types were placed in this encounter.    Counseling and Referral of Other Preventative  (Italic type indicates deductible and co-insurance are waived)    Patient Name: Delmis Shell  Today's Date: 5/3/2024    Health Maintenance       Date Due Completion Date    TETANUS VACCINE Never done ---    RSV Vaccine (Age 60+ and Pregnant patients) (1 - 1-dose 60+ series) Never done ---    Shingles Vaccine (2 of 3) 12/10/2013 10/15/2013    COVID-19 Vaccine (6 - 2023-24 season) 09/01/2023 1/31/2023    Mammogram 11/14/2023 11/14/2022    Override on 10/31/2020: Done ([facility])    DEXA Scan 02/02/2025 2/2/2023    Override on 12/2/2019: Done ([facility])    Lipid Panel 01/25/2028 1/25/2023        No orders of the defined types were placed in this encounter.    The following information is provided to all patients.  This information is to help you find resources for any of the problems found today that may be affecting your health:                  Living healthy guide: [place].gov    Understanding Diabetes: www.diabetes.org      Eating healthy: www.cdc.gov/healthyweight      CDC home safety checklist: www.cdc.gov/steadi/patient.html      Agency on Aging: [place].gov    Alcoholics anonymous (AA): www.aa.org      Physical Activity: www.jl.nih.gov/bz7gdnr      Tobacco use: [place].gov

## 2024-05-01 NOTE — LETTER
AUTHORIZATION FOR RELEASE OF   CONFIDENTIAL INFORMATION    Dear [facility] ,    We are seeing Delmis Shell, date of birth 1950, in the clinic at [facility]. Nikki Bridges NP is the patient's PCP. Delmis Shell has an outstanding lab/procedure at the time we reviewed her chart. In order to help keep her health information updated, she has authorized us to request the following medical record(s):        ( X )  MAMMOGRAM                                      (  )  COLONOSCOPY      (  )  PAP SMEAR                                          (  )  OUTSIDE LAB RESULTS     (  )  DEXA SCAN                                          (  )  EYE EXAM            (  )  FOOT EXAM                                          (  )  ENTIRE RECORD     (  )  OUTSIDE IMMUNIZATIONS                 (  )  _______________         Please fax records to [facility], Peterson, Kristin, NP, [phone]     If you have any questions, please contact Gabriella Archer at [phone].           Patient Name: Delmis Shell  : 1950  Patient Phone #: [phone]

## 2024-05-03 ENCOUNTER — OFFICE VISIT (OUTPATIENT)
Dept: FAMILY MEDICINE | Facility: CLINIC | Age: 74
End: 2024-05-03
Payer: COMMERCIAL

## 2024-05-03 VITALS
HEART RATE: 62 BPM | SYSTOLIC BLOOD PRESSURE: 130 MMHG | WEIGHT: 168 LBS | TEMPERATURE: 98 F | HEIGHT: 65 IN | RESPIRATION RATE: 20 BRPM | DIASTOLIC BLOOD PRESSURE: 72 MMHG | BODY MASS INDEX: 27.99 KG/M2 | OXYGEN SATURATION: 97 %

## 2024-05-03 DIAGNOSIS — M79.2 NEUROPATHIC PAIN: ICD-10-CM

## 2024-05-03 DIAGNOSIS — M81.0 AGE-RELATED OSTEOPOROSIS WITHOUT CURRENT PATHOLOGICAL FRACTURE: ICD-10-CM

## 2024-05-03 DIAGNOSIS — E55.9 VITAMIN D DEFICIENCY: ICD-10-CM

## 2024-05-03 DIAGNOSIS — I87.2 CHRONIC VENOUS INSUFFICIENCY OF LOWER EXTREMITY: ICD-10-CM

## 2024-05-03 DIAGNOSIS — I10 HYPERTENSION, UNSPECIFIED TYPE: ICD-10-CM

## 2024-05-03 DIAGNOSIS — Z00.00 ENCOUNTER FOR INITIAL ANNUAL WELLNESS VISIT (AWV) IN MEDICARE PATIENT: Primary | ICD-10-CM

## 2024-05-03 LAB
ALBUMIN SERPL BCP-MCNC: 3.1 G/DL (ref 3.5–5)
ALBUMIN/GLOB SERPL: 0.9 {RATIO}
ALP SERPL-CCNC: 60 U/L (ref 55–142)
ALT SERPL W P-5'-P-CCNC: 45 U/L (ref 13–56)
ANION GAP SERPL CALCULATED.3IONS-SCNC: 11 MMOL/L (ref 7–16)
AST SERPL W P-5'-P-CCNC: 22 U/L (ref 15–37)
BASOPHILS # BLD AUTO: 0.02 K/UL (ref 0–0.2)
BASOPHILS NFR BLD AUTO: 0.4 % (ref 0–1)
BILIRUB SERPL-MCNC: 0.3 MG/DL (ref ?–1.2)
BUN SERPL-MCNC: 11 MG/DL (ref 7–18)
BUN/CREAT SERPL: 12 (ref 6–20)
CALCIUM SERPL-MCNC: 9.4 MG/DL (ref 8.5–10.1)
CHLORIDE SERPL-SCNC: 107 MMOL/L (ref 98–107)
CHOLEST SERPL-MCNC: 208 MG/DL (ref 0–200)
CHOLEST/HDLC SERPL: 2.2 {RATIO}
CO2 SERPL-SCNC: 28 MMOL/L (ref 21–32)
CREAT SERPL-MCNC: 0.91 MG/DL (ref 0.55–1.02)
DIFFERENTIAL METHOD BLD: ABNORMAL
EGFR (NO RACE VARIABLE) (RUSH/TITUS): 66 ML/MIN/1.73M2
EOSINOPHIL # BLD AUTO: 0.09 K/UL (ref 0–0.5)
EOSINOPHIL NFR BLD AUTO: 1.7 % (ref 1–4)
ERYTHROCYTE [DISTWIDTH] IN BLOOD BY AUTOMATED COUNT: 13.7 % (ref 11.5–14.5)
GLOBULIN SER-MCNC: 3.4 G/DL (ref 2–4)
GLUCOSE SERPL-MCNC: 85 MG/DL (ref 74–106)
HCT VFR BLD AUTO: 36.2 % (ref 38–47)
HDLC SERPL-MCNC: 93 MG/DL (ref 40–60)
HGB BLD-MCNC: 10.5 G/DL (ref 12–16)
IMM GRANULOCYTES # BLD AUTO: 0.02 K/UL (ref 0–0.04)
IMM GRANULOCYTES NFR BLD: 0.4 % (ref 0–0.4)
LDLC SERPL CALC-MCNC: 92 MG/DL
LDLC/HDLC SERPL: 1 {RATIO}
LYMPHOCYTES # BLD AUTO: 2.85 K/UL (ref 1–4.8)
LYMPHOCYTES NFR BLD AUTO: 53.8 % (ref 27–41)
MCH RBC QN AUTO: 27.6 PG (ref 27–31)
MCHC RBC AUTO-ENTMCNC: 29 G/DL (ref 32–36)
MCV RBC AUTO: 95 FL (ref 80–96)
MONOCYTES # BLD AUTO: 0.62 K/UL (ref 0–0.8)
MONOCYTES NFR BLD AUTO: 11.7 % (ref 2–6)
MPC BLD CALC-MCNC: 10.4 FL (ref 9.4–12.4)
NEUTROPHILS # BLD AUTO: 1.7 K/UL (ref 1.8–7.7)
NEUTROPHILS NFR BLD AUTO: 32 % (ref 53–65)
NONHDLC SERPL-MCNC: 115 MG/DL
NRBC # BLD AUTO: 0 X10E3/UL
NRBC, AUTO (.00): 0 %
PLATELET # BLD AUTO: 321 K/UL (ref 150–400)
POTASSIUM SERPL-SCNC: 4.3 MMOL/L (ref 3.5–5.1)
PROT SERPL-MCNC: 6.5 G/DL (ref 6.4–8.2)
RBC # BLD AUTO: 3.81 M/UL (ref 4.2–5.4)
SODIUM SERPL-SCNC: 142 MMOL/L (ref 136–145)
TRIGL SERPL-MCNC: 113 MG/DL (ref 35–150)
VLDLC SERPL-MCNC: 23 MG/DL
WBC # BLD AUTO: 5.3 K/UL (ref 4.5–11)

## 2024-05-03 PROCEDURE — 1101F PT FALLS ASSESS-DOCD LE1/YR: CPT | Mod: ,,, | Performed by: NURSE PRACTITIONER

## 2024-05-03 PROCEDURE — 80061 LIPID PANEL: CPT | Mod: ,,, | Performed by: CLINICAL MEDICAL LABORATORY

## 2024-05-03 PROCEDURE — 1158F ADVNC CARE PLAN TLK DOCD: CPT | Mod: ,,, | Performed by: NURSE PRACTITIONER

## 2024-05-03 PROCEDURE — 85025 COMPLETE CBC W/AUTO DIFF WBC: CPT | Mod: ,,, | Performed by: CLINICAL MEDICAL LABORATORY

## 2024-05-03 PROCEDURE — 1159F MED LIST DOCD IN RCRD: CPT | Mod: ,,, | Performed by: NURSE PRACTITIONER

## 2024-05-03 PROCEDURE — 1170F FXNL STATUS ASSESSED: CPT | Mod: ,,, | Performed by: NURSE PRACTITIONER

## 2024-05-03 PROCEDURE — 3288F FALL RISK ASSESSMENT DOCD: CPT | Mod: ,,, | Performed by: NURSE PRACTITIONER

## 2024-05-03 PROCEDURE — 3078F DIAST BP <80 MM HG: CPT | Mod: ,,, | Performed by: NURSE PRACTITIONER

## 2024-05-03 PROCEDURE — 1125F AMNT PAIN NOTED PAIN PRSNT: CPT | Mod: ,,, | Performed by: NURSE PRACTITIONER

## 2024-05-03 PROCEDURE — G0438 PPPS, INITIAL VISIT: HCPCS | Mod: ,,, | Performed by: NURSE PRACTITIONER

## 2024-05-03 PROCEDURE — 3075F SYST BP GE 130 - 139MM HG: CPT | Mod: ,,, | Performed by: NURSE PRACTITIONER

## 2024-05-03 PROCEDURE — 80053 COMPREHEN METABOLIC PANEL: CPT | Mod: ,,, | Performed by: CLINICAL MEDICAL LABORATORY

## 2024-05-03 NOTE — LETTER
AUTHORIZATION FOR RELEASE OF   CONFIDENTIAL INFORMATION    Dear [facility],    We are seeing Delmis Shell, date of birth 1950, in the clinic at [facility]. Nikki Bridges NP is the patient's PCP. Delmis Shell has an outstanding lab/procedure at the time we reviewed her chart. In order to help keep her health information updated, she has authorized us to request the following medical record(s):        ( X )  MAMMOGRAM                                      (  )  COLONOSCOPY      (  )  PAP SMEAR                                          (  )  OUTSIDE LAB RESULTS     (  )  DEXA SCAN                                          (  )  EYE EXAM            (  )  FOOT EXAM                                          (  )  ENTIRE RECORD     (  )  OUTSIDE IMMUNIZATIONS                 (  )  _______________         Please fax records to Nikki Bridges NP, [phone]     If you have any questions, please contact office at [phone].           Patient Name: Delmis Shell  : 1950  Patient Phone #: [phone]

## 2024-05-06 ENCOUNTER — TELEPHONE (OUTPATIENT)
Dept: FAMILY MEDICINE | Facility: CLINIC | Age: 74
End: 2024-05-06
Payer: COMMERCIAL

## 2024-05-06 NOTE — PROGRESS NOTES
Labs reviewed: Cholesterol slightly elevated, however I do not think she was fasting. Follow low fat/low cholesterol diet and continue Rosuvastatin as ordered. Hgb and Hct were slightly low however this could be chronic for patient and it was not low enough to be concerned. We will continue to monitor this and make sure it does not continue to drop.

## 2024-05-06 NOTE — TELEPHONE ENCOUNTER
Called patient, no answer, no vmail.  Cholesterol slightly elevated, do not think patient was fasting morning of labs.  Patient does need low fat/low cholesterol diet, continue rosuvastatin.  Hgb & Hct slightly low, could be chronic, not low enough to be concerned.  We will continue to monitor.

## 2024-05-08 NOTE — TELEPHONE ENCOUNTER
Spoke with Patient and informed her of lab results. Patient did state she stopped taking Crestor at least 1 week prior to appointment because she felt she was taking too much medicine. She was advised to start and take medication as prescribed and will be mailed a low fat low cholesterol guide at address on file. Verbalized understanding.

## 2024-05-09 PROBLEM — R30.0 DYSURIA: Status: ACTIVE | Noted: 2024-05-09

## 2024-05-09 PROBLEM — R35.0 URINARY FREQUENCY: Status: ACTIVE | Noted: 2024-05-09

## 2024-05-09 NOTE — ASSESSMENT & PLAN NOTE
UA obtained and urine culture sent. Will call pt with results and any new orders. Rocephin IM. Macrobid RX. Medication instructions given with understanding voiced. Increase fluids, cranberry juice, good hygiene.

## 2024-05-17 ENCOUNTER — PATIENT OUTREACH (OUTPATIENT)
Dept: ADMINISTRATIVE | Facility: HOSPITAL | Age: 74
End: 2024-05-17
Payer: COMMERCIAL

## 2024-05-17 NOTE — PROGRESS NOTES

## 2024-05-31 ENCOUNTER — EXTERNAL CHRONIC CARE MANAGEMENT (OUTPATIENT)
Dept: FAMILY MEDICINE | Facility: CLINIC | Age: 74
End: 2024-05-31
Payer: COMMERCIAL

## 2024-05-31 PROCEDURE — G0511 CCM/BHI BY RHC/FQHC 20MIN MO: HCPCS | Mod: ,,, | Performed by: NURSE PRACTITIONER

## 2024-06-07 ENCOUNTER — PATIENT OUTREACH (OUTPATIENT)
Facility: HOSPITAL | Age: 74
End: 2024-06-07
Payer: COMMERCIAL

## 2024-06-28 ENCOUNTER — OFFICE VISIT (OUTPATIENT)
Dept: FAMILY MEDICINE | Facility: CLINIC | Age: 74
End: 2024-06-28
Payer: COMMERCIAL

## 2024-06-28 VITALS
SYSTOLIC BLOOD PRESSURE: 126 MMHG | RESPIRATION RATE: 18 BRPM | HEART RATE: 71 BPM | BODY MASS INDEX: 28.12 KG/M2 | OXYGEN SATURATION: 97 % | WEIGHT: 168.81 LBS | TEMPERATURE: 98 F | HEIGHT: 65 IN | DIASTOLIC BLOOD PRESSURE: 74 MMHG

## 2024-06-28 DIAGNOSIS — M79.671 RIGHT FOOT PAIN: Primary | ICD-10-CM

## 2024-06-28 NOTE — PROGRESS NOTES
Nikki Bridges NP   [place] [id number]  [place]  [address]      PATIENT NAME: Delmis Shell  : 1950  DATE: 24  MRN: [id number]      Billing Provider: Nikki Bridges NP  Level of Service: OK OFFICE/OUTPT VISIT, EST, LEVL II, 10-19 MIN  Patient PCP Information       Provider PCP Type    Nikki Bridges NP General            Reason for Visit / Chief Complaint: Foot Swelling (Delmis Shell 74 year old black female presents with right foot swelling. States swelling has gone down. 2 weeks ago an empty tool box fell on right foot close to her toes is where it is sore still. Remaining swelling is on top of right foot)         History of Present Illness / Problem Focused Workflow     74 year old female presents to clinic with complaints of right foot pain and swelling. Patient reports approx 2 weeks ago an empty tool box fell on right foot close to her toes. She states it is still painful. However, she reports the swelling has improved but is still present on dorsal aspect of the foot near ankle.           Review of Systems     @Review of Systems   Constitutional:  Negative for activity change, appetite change, fatigue and fever.   HENT:  Negative for nasal congestion, ear pain, rhinorrhea, sinus pressure/congestion and sore throat.    Eyes:  Negative for pain, redness, visual disturbance and eye dryness.   Respiratory:  Negative for cough and shortness of breath.    Cardiovascular:  Negative for chest pain and leg swelling.   Gastrointestinal:  Negative for abdominal distention, abdominal pain, constipation and diarrhea.   Endocrine: Negative for cold intolerance, heat intolerance and polyuria.   Genitourinary:  Negative for bladder incontinence, dysuria, frequency and urgency.   Musculoskeletal:  Positive for arthralgias. Negative for gait problem and myalgias.   Integumentary:  Negative for color change, rash and wound.   Allergic/Immunologic: Negative for environmental  allergies and food allergies.   Neurological:  Negative for dizziness, weakness, light-headedness and headaches.   Psychiatric/Behavioral:  Negative for behavioral problems and sleep disturbance.        Medical / Social / Family History     Past Medical History:   Diagnosis Date    Arthritis     Cataract     Family history of breast cancer in sister 11/14/2022    Hyperlipidemia     Hypertension     Left hip pain     Osteoporosis     Overactive bladder     Venous insufficiency, peripheral     Vitamin D deficiency        Past Surgical History:   Procedure Laterality Date    CATARACT EXTRACTION W/ INTRAOCULAR LENS  IMPLANT, BILATERAL Bilateral     EXCISION OF BREAST LESION Right 1970    KNEE ARTHROSCOPY Right 2020    low back disc surgery  2014    in [place]    TUBAL LIGATION      VAGINAL DELIVERY  1980    VAGINAL DELIVERY  1975    VENOUS ABLATION Right 09/18/2020    Anterior Shin Varithena Ablation performed by DR. Cesar Dumont.    VENOUS ABLATION Left 09/11/2020    Lower Anterior Shin Varithen Ablation performed by Dr. Cesar Dumont.       Medications and Allergies     Medications  Outpatient Medications Marked as Taking for the 6/28/24 encounter (Office Visit) with Nikki Bridges NP   Medication Sig Dispense Refill    aspirin (ECOTRIN) 81 MG EC tablet Take 81 mg by mouth once daily.      calcium carb and citrate-vitD3 600 mg calcium- 500 unit TbSR Take 1 tablet by mouth twice a week.      docusate sodium (COLACE) 100 MG capsule Take 1 capsule by mouth once daily.      ergocalciferol (ERGOCALCIFEROL) 50,000 unit Cap Take 1 capsule (50,000 Units total) by mouth every 7 days. 12 capsule 1    fluticasone propionate (FLOVENT DISKUS) 50 mcg/actuation DsDv Inhale 1 spray into the lungs 2 (two) times a day. Controller 3 each 1    gabapentin (NEURONTIN) 300 MG capsule Take 1 capsule (300 mg total) by mouth 3 (three) times daily as needed (Neuropathy). 90 capsule 1    garlic 1,000 mg Cap Take 1 capsule by mouth once daily.       hydrOXYzine pamoate (VISTARIL) 25 MG Cap TAKE 1 CAPSULE BY MOUTH 2 TIMES A DAY AS NEEDED FOR ITCHING 180 capsule 1    loratadine (CLARITIN) 10 mg tablet Take 1 tablet (10 mg total) by mouth once daily. 90 tablet 1    meclizine (ANTIVERT) 12.5 mg tablet Take 1 tablet (12.5 mg total) by mouth 3 (three) times daily as needed for Dizziness. 60 tablet 1    meloxicam (MOBIC) 15 MG tablet Take 1 tablet (15 mg total) by mouth once daily. 90 tablet 1    mupirocin (BACTROBAN) 2 % ointment Apply topically 3 (three) times daily. 22 g 0    olmesartan-hydrochlorothiazide (BENICAR HCT) 20-12.5 mg per tablet Take 1 tablet by mouth once daily. 90 tablet 0    olopatadine (PATANOL) 0.1 % ophthalmic solution Place 1 drop into both eyes 2 (two) times daily.      oxybutynin (DITROPAN) 5 MG Tab Take 1 tablet (5 mg total) by mouth 3 (three) times daily. 90 tablet 3    potassium chloride (KLOR-CON) 10 MEQ TbSR Take 10 mEq by mouth once daily.      rosuvastatin (CRESTOR) 10 MG tablet Take 1 tablet (10 mg total) by mouth once daily. 90 tablet 3    tiZANidine (ZANAFLEX) 4 MG tablet Take 1 tablet by mouth every 8 hours as needed for muscle spasms. 30 tablet 2    triamcinolone acetonide 0.1% (KENALOG) 0.1 % cream Apply topically 2 (two) times daily. 28 g 2       Allergies  Review of patient's allergies indicates:  No Known Allergies    Physical Examination     Vitals:    06/28/24 1543   BP: 126/74   Pulse: 71   Resp: 18   Temp: 97.9 °F (36.6 °C)     Physical Exam  Vitals and nursing note reviewed.   HENT:      Head: Normocephalic.      Nose: Nose normal.      Mouth/Throat:      Mouth: Mucous membranes are moist.      Pharynx: Oropharynx is clear. No posterior oropharyngeal erythema.   Eyes:      Conjunctiva/sclera: Conjunctivae normal.   Cardiovascular:      Rate and Rhythm: Normal rate and regular rhythm.      Pulses: Normal pulses.      Heart sounds: Normal heart sounds.   Pulmonary:      Effort: Pulmonary effort is normal.      Breath  sounds: Normal breath sounds.   Abdominal:      General: Abdomen is flat. Bowel sounds are normal. There is no distension.      Palpations: Abdomen is soft.   Musculoskeletal:         General: No swelling. Normal range of motion.      Cervical back: Normal range of motion.      Right lower leg: No edema.      Left lower leg: No edema.      Right foot: Swelling and tenderness present.        Feet:    Skin:     General: Skin is warm and dry.      Capillary Refill: Capillary refill takes less than 2 seconds.   Neurological:      Mental Status: She is alert. Mental status is at baseline.   Psychiatric:         Mood and Affect: Mood normal.         Behavior: Behavior normal.               Lab Results   Component Value Date    WBC 5.30 05/03/2024    HGB 10.5 (L) 05/03/2024    HCT 36.2 (L) 05/03/2024    MCV 95.0 05/03/2024     05/03/2024        CMP  Sodium   Date Value Ref Range Status   05/03/2024 142 136 - 145 mmol/L Final     Potassium   Date Value Ref Range Status   05/03/2024 4.3 3.5 - 5.1 mmol/L Final     Chloride   Date Value Ref Range Status   05/03/2024 107 98 - 107 mmol/L Final     CO2   Date Value Ref Range Status   05/03/2024 28 21 - 32 mmol/L Final     Glucose   Date Value Ref Range Status   05/03/2024 85 74 - 106 mg/dL Final     BUN   Date Value Ref Range Status   05/03/2024 11 7 - 18 mg/dL Final     Creatinine   Date Value Ref Range Status   05/03/2024 0.91 0.55 - 1.02 mg/dL Final     Calcium   Date Value Ref Range Status   05/03/2024 9.4 8.5 - 10.1 mg/dL Final     Total Protein   Date Value Ref Range Status   05/03/2024 6.5 6.4 - 8.2 g/dL Final     Albumin   Date Value Ref Range Status   05/03/2024 3.1 (L) 3.5 - 5.0 g/dL Final     Bilirubin, Total   Date Value Ref Range Status   05/03/2024 0.3 >0.0 - 1.2 mg/dL Final     Alk Phos   Date Value Ref Range Status   05/03/2024 60 55 - 142 U/L Final     AST   Date Value Ref Range Status   05/03/2024 22 15 - 37 U/L Final     ALT   Date Value Ref Range Status    05/03/2024 45 13 - 56 U/L Final     Anion Gap   Date Value Ref Range Status   05/03/2024 11 7 - 16 mmol/L Final     eGFR   Date Value Ref Range Status   05/03/2024 66 >=60 mL/min/1.73m2 Final     Procedures   Assessment and Plan (including Health Maintenance)   :    Plan:     Problem List Items Addressed This Visit          Orthopedic    Right foot pain - Primary    Current Assessment & Plan     Xray has not been read yet. However, I visualize a possible hairline fracture at base of 3rd metatarsal. It is not displaced and has already started healing as injury was 2 weeks ago. Encouraged patient to wear well fitting, supportive shoes. Take Tylenol/Ibuprofen as needed for pain. Elevate and Ice foot to help swelling.   Follow up if symptoms persist or worsen.          Relevant Orders    X-Ray Foot Complete 3 view Right       Health Maintenance Topics with due status: Not Due       Topic Last Completion Date    DEXA Scan 02/02/2023    Mammogram 11/29/2023    Lipid Panel 05/03/2024       Future Appointments   Date Time Provider Department Center   10/1/2024  9:00 AM INFUSION, [facility] [place]    11/6/2024 10:20 AM Nikki Bridges NP [facility] [place]   5/8/2025 10:00 [facility] [place]        Health Maintenance Due   Topic Date Due    TETANUS VACCINE  Never done    RSV Vaccine (Age 60+ and Pregnant patients) (1 - 1-dose 60+ series) Never done    Shingles Vaccine (2 of 3) 12/10/2013    COVID-19 Vaccine (6 - 2023-24 season) 09/01/2023          Signature:  Nikki Bridges NP  [facility]  [address] [place] [address], [place]  [address]    Date of encounter: 6/28/24

## 2024-06-28 NOTE — ASSESSMENT & PLAN NOTE
Xray has not been read yet. However, I visualize a possible hairline fracture at base of 3rd metatarsal. It is not displaced and has already started healing as injury was 2 weeks ago. Encouraged patient to wear well fitting, supportive shoes. Take Tylenol/Ibuprofen as needed for pain. Elevate and Ice foot to help swelling.   Follow up if symptoms persist or worsen.

## 2024-06-30 ENCOUNTER — EXTERNAL CHRONIC CARE MANAGEMENT (OUTPATIENT)
Dept: FAMILY MEDICINE | Facility: CLINIC | Age: 74
End: 2024-06-30
Payer: COMMERCIAL

## 2024-06-30 PROCEDURE — G0511 CCM/BHI BY RHC/FQHC 20MIN MO: HCPCS | Mod: ,,, | Performed by: NURSE PRACTITIONER

## 2024-07-23 DIAGNOSIS — M79.2 NEUROPATHIC PAIN: ICD-10-CM

## 2024-07-23 RX ORDER — GABAPENTIN 300 MG/1
300 CAPSULE ORAL 3 TIMES DAILY PRN
Qty: 90 CAPSULE | Refills: 1 | Status: SHIPPED | OUTPATIENT
Start: 2024-07-23

## 2024-07-23 RX ORDER — IBUPROFEN 800 MG/1
800 TABLET ORAL EVERY 12 HOURS PRN
COMMUNITY
Start: 2024-07-22

## 2024-07-29 PROBLEM — J01.40 ACUTE NON-RECURRENT PANSINUSITIS: Status: RESOLVED | Noted: 2022-10-18 | Resolved: 2024-07-29

## 2024-07-31 ENCOUNTER — EXTERNAL CHRONIC CARE MANAGEMENT (OUTPATIENT)
Dept: FAMILY MEDICINE | Facility: CLINIC | Age: 74
End: 2024-07-31
Payer: COMMERCIAL

## 2024-07-31 PROCEDURE — G0511 CCM/BHI BY RHC/FQHC 20MIN MO: HCPCS | Mod: ,,, | Performed by: NURSE PRACTITIONER

## 2024-08-19 ENCOUNTER — OFFICE VISIT (OUTPATIENT)
Dept: FAMILY MEDICINE | Facility: CLINIC | Age: 74
End: 2024-08-19
Payer: COMMERCIAL

## 2024-08-19 VITALS
WEIGHT: 169.38 LBS | OXYGEN SATURATION: 98 % | HEART RATE: 65 BPM | SYSTOLIC BLOOD PRESSURE: 155 MMHG | TEMPERATURE: 99 F | DIASTOLIC BLOOD PRESSURE: 80 MMHG | RESPIRATION RATE: 18 BRPM | HEIGHT: 65 IN | BODY MASS INDEX: 28.22 KG/M2

## 2024-08-19 DIAGNOSIS — M79.671 PAIN IN BOTH FEET: ICD-10-CM

## 2024-08-19 DIAGNOSIS — M79.672 PAIN IN BOTH FEET: ICD-10-CM

## 2024-08-19 DIAGNOSIS — N30.00 ACUTE CYSTITIS WITHOUT HEMATURIA: Primary | ICD-10-CM

## 2024-08-19 DIAGNOSIS — R79.9 ABNORMAL FINDING OF BLOOD CHEMISTRY, UNSPECIFIED: ICD-10-CM

## 2024-08-19 DIAGNOSIS — M72.2 PLANTAR FASCIITIS, BILATERAL: ICD-10-CM

## 2024-08-19 DIAGNOSIS — G47.62 SLEEP RELATED LEG CRAMPS: ICD-10-CM

## 2024-08-19 LAB
BILIRUB SERPL-MCNC: NORMAL MG/DL
BLOOD URINE, POC: NORMAL
CLARITY, POC UA: CLEAR
COLOR, POC UA: NORMAL
GLUCOSE UR QL STRIP: 100
KETONES UR QL STRIP: NORMAL
LEUKOCYTE ESTERASE URINE, POC: NORMAL
NITRITE, POC UA: POSITIVE
PH, POC UA: 5.5
PROTEIN, POC: 30
SPECIFIC GRAVITY, POC UA: 1.02
UROBILINOGEN, POC UA: 1

## 2024-08-19 PROCEDURE — 1101F PT FALLS ASSESS-DOCD LE1/YR: CPT | Mod: ,,, | Performed by: NURSE PRACTITIONER

## 2024-08-19 PROCEDURE — 1126F AMNT PAIN NOTED NONE PRSNT: CPT | Mod: ,,, | Performed by: NURSE PRACTITIONER

## 2024-08-19 PROCEDURE — 3079F DIAST BP 80-89 MM HG: CPT | Mod: ,,, | Performed by: NURSE PRACTITIONER

## 2024-08-19 PROCEDURE — 1160F RVW MEDS BY RX/DR IN RCRD: CPT | Mod: ,,, | Performed by: NURSE PRACTITIONER

## 2024-08-19 PROCEDURE — 3077F SYST BP >= 140 MM HG: CPT | Mod: ,,, | Performed by: NURSE PRACTITIONER

## 2024-08-19 PROCEDURE — 1159F MED LIST DOCD IN RCRD: CPT | Mod: ,,, | Performed by: NURSE PRACTITIONER

## 2024-08-19 PROCEDURE — 81003 URINALYSIS AUTO W/O SCOPE: CPT | Mod: QW,,, | Performed by: NURSE PRACTITIONER

## 2024-08-19 PROCEDURE — 99214 OFFICE O/P EST MOD 30 MIN: CPT | Mod: ,,, | Performed by: NURSE PRACTITIONER

## 2024-08-19 PROCEDURE — 3288F FALL RISK ASSESSMENT DOCD: CPT | Mod: ,,, | Performed by: NURSE PRACTITIONER

## 2024-08-19 RX ORDER — NITROFURANTOIN 25; 75 MG/1; MG/1
100 CAPSULE ORAL 2 TIMES DAILY
Qty: 14 CAPSULE | Refills: 0 | Status: SHIPPED | OUTPATIENT
Start: 2024-08-19

## 2024-08-19 RX ORDER — PHENAZOPYRIDINE HYDROCHLORIDE 100 MG/1
100 TABLET, FILM COATED ORAL 3 TIMES DAILY PRN
Qty: 20 TABLET | Refills: 0 | Status: SHIPPED | OUTPATIENT
Start: 2024-08-19 | End: 2024-08-29

## 2024-08-19 RX ORDER — METHYLPREDNISOLONE 4 MG/1
TABLET ORAL
Qty: 21 EACH | Refills: 0 | Status: SHIPPED | OUTPATIENT
Start: 2024-08-19 | End: 2024-09-09

## 2024-08-19 NOTE — PROGRESS NOTES
Health Maintenance Due   Topic Date Due    TETANUS VACCINE  Had tetanus done about 5 years ago here per patient    RSV Vaccine (Age 60+ and Pregnant patients) (1 - 1-dose 60+ series) Wants vaccine     Shingles Vaccine (2 of 3) 12/10/2013    COVID-19 Vaccine (6 - 2023-24 season) 09/01/2023     I have discussed care gaps with patient.

## 2024-08-20 ENCOUNTER — TELEPHONE (OUTPATIENT)
Dept: FAMILY MEDICINE | Facility: CLINIC | Age: 74
End: 2024-08-20
Payer: COMMERCIAL

## 2024-08-20 NOTE — TELEPHONE ENCOUNTER
----- Message from Nikki Bridges NP sent at 8/19/2024  9:39 PM CDT -----  I ordered labs on her yesterday and forgot to take her up there to draw them.

## 2024-08-20 NOTE — ASSESSMENT & PLAN NOTE
Medrol Dose Pack as ordered. Follow up with podiatry as scheduled. Will refer to PT per patient request.    -Will start on MDP.  -Encouraged to wear good fitting shoes with supportive insoles  -Sample stretches provided. Encouraged to stretch and perform exercises twice daily.   -Encouraged to attain and maintain a healthy body weight. Patient was instructed to follow up if symptoms persist past current treatment plan to discuss further options, such as physical therapy, referral to ortho or diagnostic imaging.

## 2024-08-20 NOTE — ASSESSMENT & PLAN NOTE
UA showed large WBC, positive nitrites. She is symptomatic of UTI. Will start treatment and culture urine.     Reviewed pathology of current symptoms, treatment plan, medication side effects/risk/benefits/directions on taking medications, and worsening or persistent symptoms that require follow up in next 1-2 days. Patient was instructed to take antibiotic as directed, complete entire course to avoid antibiotic resistance, and take OTC probiotic with antibiotic to prevent GI upset. Patient will be called with results from urine culture and treatment plan changed accordingly. Discussed prevention education regarding cranberry pills, increase water intake, and decrease caffeinated beverages. Patient verbalized understanding of treatment plan and denies any questions.

## 2024-08-20 NOTE — PROGRESS NOTES
Nikki Bridges NP   [facility]  [address]  [place]  [address]      PATIENT NAME: Delmis Shell  : 1950  DATE: 24  MRN: [id number]      Billing Provider: Nikki Bridges NP  Level of Service: NM OFFICE/OUTPT VISIT, EST, LEVL IV, 30-39 MIN  Patient PCP Information       Provider PCP Type    Nikki Bridges NP General            Reason for Visit / Chief Complaint: Leg Pain (Patient is Delmis Shell a 73 y/o female that reports she is having tightness in both lower extremities. Patient states it has felt like this for 3 weeks and makes it hard for her to sleep at night.), Sinus Problem (Patient reports sinus problems that come and go. She states she gets sinus pressure and a headache. She has taken nasal spray to help relieve the pressure. She states she has had no pain since Friday.), and urinary problems (Patient states she has been having urinary urgency and then either has a little incontinence on the way to the bathroom or gets there and can only release a drop or two. Patient states this has been going on about a week.)         History of Present Illness / Problem Focused Workflow     73 y/o female presents to clinic with multiple complaints. Urinary problems: Patient states she has been having urinary urgency and then either has a little incontinence on the way to the bathroom or gets there and can only release a drop or two. Patient states this has been going on about a week.  Leg pain: She reports she is having pain in bilat feet that starts on plantar but also extends up to dorsal aspect as well. She states this is how it felt when she had plantar fascitis before. She has contacted Dr Martinez's office and has appt to go back to see her next month. She is requesting physical therapy to see if it will help this problems. She is also complaiing of cramping in both lower extremities. Patient states it has felt like this for 3 weeks and makes it hard for her to sleep at  night.  Urinary problems: Patient states she has been having urinary urgency and then either has a little incontinence on the way to the bathroom or gets there and can only release a drop or two. Patient states this has been going on about a week.  Sinus Problem: Patient reports sinus problems that come and go. She states she gets sinus pressure and a headache. She has taken nasal spray to help relieve the pressure. She states she has had no pain since Friday.            Review of Systems     @Review of Systems   Constitutional:  Negative for activity change, appetite change, fatigue and fever.   HENT:  Positive for sinus pressure/congestion. Negative for nasal congestion, ear pain, rhinorrhea and sore throat.    Eyes:  Negative for pain, redness, visual disturbance and eye dryness.   Respiratory:  Negative for cough and shortness of breath.    Cardiovascular:  Negative for chest pain and leg swelling.   Gastrointestinal:  Negative for abdominal distention, abdominal pain, constipation and diarrhea.   Endocrine: Negative for cold intolerance, heat intolerance and polyuria.   Genitourinary:  Positive for bladder incontinence, dysuria, frequency and urgency.   Musculoskeletal:  Positive for leg pain. Negative for arthralgias, gait problem and myalgias.   Integumentary:  Negative for color change, rash and wound.   Allergic/Immunologic: Negative for environmental allergies and food allergies.   Neurological:  Negative for dizziness, weakness, light-headedness and headaches.   Psychiatric/Behavioral:  Negative for behavioral problems and sleep disturbance.        Medical / Social / Family History     Past Medical History:   Diagnosis Date    Arthritis     Cataract     Family history of breast cancer in sister 11/14/2022    Hyperlipidemia     Hypertension     Left hip pain     Osteoporosis     Overactive bladder     Venous insufficiency, peripheral     Vitamin D deficiency        Past Surgical History:   Procedure  Laterality Date    CATARACT EXTRACTION W/ INTRAOCULAR LENS  IMPLANT, BILATERAL Bilateral     EXCISION OF BREAST LESION Right 1970    KNEE ARTHROSCOPY Right 2020    low back disc surgery  2014    in [place]    TUBAL LIGATION      VAGINAL DELIVERY  1980    VAGINAL DELIVERY  1975    VENOUS ABLATION Right 09/18/2020    Anterior Shin Varithena Ablation performed by DR. Cesar Dumont.    VENOUS ABLATION Left 09/11/2020    Lower Anterior Shin Varithen Ablation performed by Dr. Cesar Dumont.       Medications and Allergies     Medications  Outpatient Medications Marked as Taking for the 8/19/24 encounter (Office Visit) with Nikki Bridges NP   Medication Sig Dispense Refill    aspirin (ECOTRIN) 81 MG EC tablet Take 81 mg by mouth once daily.      calcium carb and citrate-vitD3 600 mg calcium- 500 unit TbSR Take 1 tablet by mouth twice a week.      docusate sodium (COLACE) 100 MG capsule Take 1 capsule by mouth once daily.      ergocalciferol (ERGOCALCIFEROL) 50,000 unit Cap Take 1 capsule (50,000 Units total) by mouth every 7 days. 12 capsule 1    fluticasone propionate (FLOVENT DISKUS) 50 mcg/actuation DsDv Inhale 1 spray into the lungs 2 (two) times a day. Controller 3 each 1    gabapentin (NEURONTIN) 300 MG capsule Take 1 capsule (300 mg total) by mouth 3 (three) times daily as needed (Neuropathy). 90 capsule 1    garlic 1,000 mg Cap Take 1 capsule by mouth once daily.      hydrOXYzine pamoate (VISTARIL) 25 MG Cap TAKE 1 CAPSULE BY MOUTH 2 TIMES A DAY AS NEEDED FOR ITCHING 180 capsule 1    ibuprofen (ADVIL,MOTRIN) 800 MG tablet Take 800 mg by mouth every 12 (twelve) hours as needed.      loratadine (CLARITIN) 10 mg tablet Take 1 tablet (10 mg total) by mouth once daily. 90 tablet 1    meclizine (ANTIVERT) 12.5 mg tablet Take 1 tablet (12.5 mg total) by mouth 3 (three) times daily as needed for Dizziness. 60 tablet 1    meloxicam (MOBIC) 15 MG tablet Take 1 tablet (15 mg total) by mouth once daily. 90 tablet 1     mupirocin (BACTROBAN) 2 % ointment Apply topically 3 (three) times daily. 22 g 0    olmesartan-hydrochlorothiazide (BENICAR HCT) 20-12.5 mg per tablet Take 1 tablet by mouth once daily. 90 tablet 0    olopatadine (PATANOL) 0.1 % ophthalmic solution Place 1 drop into both eyes 2 (two) times daily.      oxybutynin (DITROPAN) 5 MG Tab Take 1 tablet (5 mg total) by mouth 3 (three) times daily. 90 tablet 3    potassium chloride (KLOR-CON) 10 MEQ TbSR Take 10 mEq by mouth once daily.      rosuvastatin (CRESTOR) 10 MG tablet Take 1 tablet (10 mg total) by mouth once daily. 90 tablet 3    tiZANidine (ZANAFLEX) 4 MG tablet Take 1 tablet by mouth every 8 hours as needed for muscle spasms. 30 tablet 2    triamcinolone acetonide 0.1% (KENALOG) 0.1 % cream Apply topically 2 (two) times daily. 28 g 2       Allergies  Review of patient's allergies indicates:  No Known Allergies    Physical Examination     Vitals:    08/19/24 1139   BP: (!) 155/80   Pulse: 65   Resp: 18   Temp: 98.5 °F (36.9 °C)     Physical Exam  Vitals and nursing note reviewed.   HENT:      Head: Normocephalic.      Nose: Nose normal.      Mouth/Throat:      Mouth: Mucous membranes are moist.      Pharynx: Oropharynx is clear.   Eyes:      Conjunctiva/sclera: Conjunctivae normal.   Cardiovascular:      Rate and Rhythm: Normal rate and regular rhythm.      Pulses: Normal pulses.      Heart sounds: Normal heart sounds.   Pulmonary:      Effort: Pulmonary effort is normal.   Abdominal:      General: Abdomen is flat. Bowel sounds are normal.      Palpations: Abdomen is soft.      Tenderness: There is abdominal tenderness in the suprapubic area. There is no right CVA tenderness or left CVA tenderness.   Musculoskeletal:         General: Normal range of motion.      Cervical back: Normal range of motion.      Right foot: Tenderness present.      Left foot: Tenderness present.   Skin:     General: Skin is warm and dry.      Capillary Refill: Capillary refill takes less  than 2 seconds.   Neurological:      Mental Status: She is alert. Mental status is at baseline.   Psychiatric:         Mood and Affect: Mood normal.         Behavior: Behavior normal.               Lab Results   Component Value Date    WBC 5.30 05/03/2024    HGB 10.5 (L) 05/03/2024    HCT 36.2 (L) 05/03/2024    MCV 95.0 05/03/2024     05/03/2024        CMP  Sodium   Date Value Ref Range Status   05/03/2024 142 136 - 145 mmol/L Final     Potassium   Date Value Ref Range Status   05/03/2024 4.3 3.5 - 5.1 mmol/L Final     Chloride   Date Value Ref Range Status   05/03/2024 107 98 - 107 mmol/L Final     CO2   Date Value Ref Range Status   05/03/2024 28 21 - 32 mmol/L Final     Glucose   Date Value Ref Range Status   05/03/2024 85 74 - 106 mg/dL Final     BUN   Date Value Ref Range Status   05/03/2024 11 7 - 18 mg/dL Final     Creatinine   Date Value Ref Range Status   05/03/2024 0.91 0.55 - 1.02 mg/dL Final     Calcium   Date Value Ref Range Status   05/03/2024 9.4 8.5 - 10.1 mg/dL Final     Total Protein   Date Value Ref Range Status   05/03/2024 6.5 6.4 - 8.2 g/dL Final     Albumin   Date Value Ref Range Status   05/03/2024 3.1 (L) 3.5 - 5.0 g/dL Final     Bilirubin, Total   Date Value Ref Range Status   05/03/2024 0.3 >0.0 - 1.2 mg/dL Final     Alk Phos   Date Value Ref Range Status   05/03/2024 60 55 - 142 U/L Final     AST   Date Value Ref Range Status   05/03/2024 22 15 - 37 U/L Final     ALT   Date Value Ref Range Status   05/03/2024 45 13 - 56 U/L Final     Anion Gap   Date Value Ref Range Status   05/03/2024 11 7 - 16 mmol/L Final     eGFR   Date Value Ref Range Status   05/03/2024 66 >=60 mL/min/1.73m2 Final     Procedures   Assessment and Plan (including Health Maintenance)   :    Plan:     Problem List Items Addressed This Visit          Renal/    Acute cystitis without hematuria - Primary    Current Assessment & Plan     UA showed large WBC, positive nitrites. She is symptomatic of UTI. Will start  treatment and culture urine.     Reviewed pathology of current symptoms, treatment plan, medication side effects/risk/benefits/directions on taking medications, and worsening or persistent symptoms that require follow up in next 1-2 days. Patient was instructed to take antibiotic as directed, complete entire course to avoid antibiotic resistance, and take OTC probiotic with antibiotic to prevent GI upset. Patient will be called with results from urine culture and treatment plan changed accordingly. Discussed prevention education regarding cranberry pills, increase water intake, and decrease caffeinated beverages. Patient verbalized understanding of treatment plan and denies any questions.          Relevant Orders    POCT URINALYSIS W/O SCOPE (Completed)    Urine culture       Orthopedic    Plantar fasciitis, bilateral    Current Assessment & Plan     Medrol Dose Pack as ordered. Follow up with podiatry as scheduled. Will refer to PT per patient request.    -Will start on MDP.  -Encouraged to wear good fitting shoes with supportive insoles  -Sample stretches provided. Encouraged to stretch and perform exercises twice daily.   -Encouraged to attain and maintain a healthy body weight. Patient was instructed to follow up if symptoms persist past current treatment plan to discuss further options, such as physical therapy, referral to ortho or diagnostic imaging.              Relevant Orders    Ambulatory referral/consult to Physical/Occupational Therapy       Other    Sleep related leg cramps    Current Assessment & Plan     Will check BMP today and follow up with results.           Other Visit Diagnoses       Pain in both feet        Relevant Orders    Hemoglobin A1C    Comprehensive Metabolic Panel    Abnormal finding of blood chemistry, unspecified        Relevant Orders    Hemoglobin A1C            Health Maintenance Topics with due status: Not Due       Topic Last Completion Date    DEXA Scan 02/02/2023    Mammogram  11/29/2023    Influenza Vaccine 03/11/2024    Lipid Panel 05/03/2024       Future Appointments   Date Time Provider Department Center   8/26/2024 10:15 AM Keshav Maldonado PT [facility] OP RT [facility]   10/1/2024  9:00 AM [facility] [place] [facility]   11/6/2024 10:20 AM Nikki Bridges NP [facility] [place]   5/8/2025 10:00 AM AW[facility] NURSE [facility] [place]        Health Maintenance Due   Topic Date Due    TETANUS VACCINE  Never done    RSV Vaccine (Age 60+ and Pregnant patients) (1 - 1-dose 60+ series) Never done    Shingles Vaccine (2 of 3) 12/10/2013    COVID-19 Vaccine (6 - 2023-24 season) 09/01/2023          Signature:  Nikki Bridges NP  [facility]  [address] [address] [place]  [address]    Date of encounter: 8/19/24

## 2024-08-20 NOTE — TELEPHONE ENCOUNTER
Patient notified to RTC for labs. Patient stated she will come to clinic for labs only this week.

## 2024-08-23 NOTE — PROGRESS NOTES
Spoke with providfer regarding no urine collected for urine culture. Okay to cancel urine culture.

## 2024-08-26 ENCOUNTER — CLINICAL SUPPORT (OUTPATIENT)
Dept: REHABILITATION | Facility: HOSPITAL | Age: 74
End: 2024-08-26
Payer: COMMERCIAL

## 2024-08-26 DIAGNOSIS — M72.2 PLANTAR FASCIITIS, BILATERAL: Primary | ICD-10-CM

## 2024-08-26 DIAGNOSIS — M72.2 BILATERAL PLANTAR FASCIITIS: ICD-10-CM

## 2024-08-26 PROCEDURE — 97110 THERAPEUTIC EXERCISES: CPT | Mod: PN

## 2024-08-26 PROCEDURE — 97161 PT EVAL LOW COMPLEX 20 MIN: CPT | Mod: PN

## 2024-08-26 NOTE — PLAN OF CARE
[facility]   Physical Therapy Initial Evaluation    Date: 8/26/2024   Name: Delmis Shell  Clinic Number: [id number]    Therapy Diagnosis:   Encounter Diagnoses   Name Primary?    Plantar fasciitis, bilateral Yes    Bilateral plantar fasciitis      Physician: Nikki Bridges NP    Physician Orders: PT Eval and Treat    Medical Diagnosis from Referral: bilateral plantar fasciitis   Evaluation Date: 8/26/2024  Updated Plan of Care Due : 10/26/2024   Authorization Period Expiration: wellcare   Plan of Care Expiration: 10/26/2024   Visit # / Visits authorized: 1/ 9    Time In: 1010  Time Out: 1100  Total Appointment Time (timed & untimed codes): 50 minutes    Precautions: Standard    Subjective   Date of onset: pt states she began having foot pain bilaterally about 2 weeks ago after wearing some flat shoes,  pt voices she got to where she couldn't put her feet on the floor.  Pt states she has worse pain when she first gets out of bed .  Pt states she tried stretching and did not get any relief . Pt voices she has had some pain from mid way in her leg down to her feet , even couldn't let sheet touch her legs one night   History of current condition - Delmis reports: hx below      Medical History:   Past Medical History:   Diagnosis Date    Arthritis     Cataract     Family history of breast cancer in sister 11/14/2022    Hyperlipidemia     Hypertension     Left hip pain     Osteoporosis     Overactive bladder     Venous insufficiency, peripheral     Vitamin D deficiency        Surgical History:   Delmis Shell  has a past surgical history that includes Excision of breast lesion (Right, 1970); Knee arthroscopy (Right, 2020); low back disc surgery (2014); Tubal ligation; Vaginal delivery (1980); Vaginal delivery (1975); Venous ablation (Right, 09/18/2020); Venous ablation (Left, 09/11/2020); and Cataract extraction w/ intraocular lens  implant, bilateral (Bilateral).    Medications:   Delmis has a current  medication list which includes the following prescription(s): aspirin, calcium carb and citrate-vitd3, docusate sodium, ergocalciferol, fluticasone propionate, gabapentin, garlic, hydroxyzine pamoate, ibuprofen, loratadine, meclizine, meloxicam, methylprednisolone, mupirocin, nitrofurantoin (macrocrystal-monohydrate), olmesartan-hydrochlorothiazide, olopatadine, oxybutynin, phenazopyridine, potassium chloride, rosuvastatin, tizanidine, and triamcinolone acetonide 0.1%.    Allergies:   Review of patient's allergies indicates:  No Known Allergies     Imaging, none:      Prior Therapy: none recent   Social History:   lives with their spouse  Occupation: retired   Prior Level of Function: independent   Current Level of Function: bilateral foot pain     Pain:  Current 5/10, worst 10/10, best 5/10   Location: bilateral feet   plantar fascia pain    Description: Aching, Dull, Grabbing, Tight, and Deep  Aggravating Factors: Standing, Walking, and Morning  Easing Factors: nothing    Patients goals: be able to alleviate pain in both feet , be able to walk pain free     Objective         Observation :  pain with palpation of bilateral plantar fascia                   Range of Motion/Strength :                  Left Extremity                                                                        Right Extremity   AROM PROM Strength  Location  AROM    PROM   Strength   110  3+   Hip      Flexion 95  3+                                       -45   Hamstring length  -45          Knee    Flexion                        Extension      5  3+   Ankle   Dorsiflexion 5  3+   45  3+                Plantar Flexion 45  3+   15  3+                Inversion 15  3+   12  3+                Eversion 12  3+                  Functional Impairments :  decreased flexibility in plantar fascia , hamstring and glute       Limitation/Restriction for FOTO foot  Survey    Therapist reviewed FOTO scores for Delmis Shell on 8/26/2024.   FOTO documents  entered into [facility] - see Media section.    Limitation Score: 51%         TREATMENT        Delmis received the treatments listed below:  THERAPEUTIC EXERCISES to develop strength, endurance, ROM, flexibility, and posture for 20  minutes including home ex program         Home Exercises and Patient Education Provided    Education provided:   - Pt performed and received home ex program.     Written Home Exercises Provided: Patient instructed to cont prior HEP.  Exercises were reviewed and Delmis was able to demonstrate them prior to the end of the session.  Delmis demonstrated good  understanding of the education provided.     See EMR under Patient Instructions for exercises provided 8/26/2024.    Assessment   Delmis is a 74 y.o. female referred to outpatient Physical Therapy with a medical diagnosis of bilateral plantar fasciitis . Patient presents with bilateral foot pain , plantar fasciitis, tight hamstring , glutes and calf     Patient prognosis is Excellent.   Patientt will benefit from skilled outpatient Physical Therapy to address the deficits stated above and in the chart below, provide patient /family education, and to maximize patientt's level of independence.     Plan of care discussed with patient: Yes  Patient's spiritual, cultural and educational needs considered and patient is agreeable to the plan of care and goals as stated below:     Anticipated Barriers for therapy: medical diagnosis of bilateral plantar fasciitis . Patient presents with bilateral foot pain , plantar fasciitis, tight hamstring , glutes and calf     Goals:  Short Term Goals: 4 weeks   Pt will be independent with home ex program   Pt will increase hip flexion to 120 degrees bilaterally   Increase hamstring length bilaterally to -25   Be able to dorsiflexion to 10 degrees bilaterally   Decrease pain from 10/10 to 7/10 at worse     Long Term Goals: 8  weeks   Be able to walk a mile without symptoms   Be able to get out of bed with pain  less than 5/10   Return to pain free shopping .     Plan   Plan of care Certification: 8/26/2024 to 10/26/2024.    Outpatient Physical Therapy 2 times weekly for 8 weeks to include the following interventions: Manual Therapy, Neuromuscular Re-ed, Patient Education, Therapeutic Activities, Ultrasound, and modalities as needed.    Plan of care has been reestablished with Lakshmi PEÑA and  Merlyn PEÑA,       Keshav Maldonado, PT

## 2024-08-29 ENCOUNTER — CLINICAL SUPPORT (OUTPATIENT)
Dept: REHABILITATION | Facility: HOSPITAL | Age: 74
End: 2024-08-29
Payer: COMMERCIAL

## 2024-08-29 DIAGNOSIS — M25.671 DECREASED RANGE OF MOTION OF BOTH ANKLES: ICD-10-CM

## 2024-08-29 DIAGNOSIS — M25.672 DECREASED RANGE OF MOTION OF BOTH ANKLES: ICD-10-CM

## 2024-08-29 DIAGNOSIS — M72.2 PLANTAR FASCIITIS, BILATERAL: Primary | ICD-10-CM

## 2024-08-29 PROCEDURE — 97112 NEUROMUSCULAR REEDUCATION: CPT | Mod: PN,CQ

## 2024-08-29 PROCEDURE — 97530 THERAPEUTIC ACTIVITIES: CPT | Mod: PN,CQ

## 2024-08-29 PROCEDURE — 97140 MANUAL THERAPY 1/> REGIONS: CPT | Mod: PN,CQ

## 2024-08-29 NOTE — PROGRESS NOTES
Physical Therapy Treatment Note     Name: Delmis Shell  Clinic Number: [id number]    Therapy Diagnosis: No diagnosis found.  Physician: Nikki Bridges NP    Visit Date: 8/29/2024    Physician Orders: PT Eval and Treat    Medical Diagnosis from Referral: bilateral plantar fasciitis   Evaluation Date: 8/26/2024  Updated Plan of Care Due : 10/26/2024   Authorization Period Expiration: wellcare   Plan of Care Expiration: 10/26/2024   Visit # / Visits authorized: 2/ 9  PTA Visit #: 1    Time In: 930  Time Out: 1015  Total Billable Time: 45 minutes    Precautions: Standard      Subjective     Pt reports: I'm able to walk a little better.  She was compliant with home exercise program.  Response to previous treatment: less pain with walking  Functional change: ongoing    Pain: 2/10  Location: bilateral feet      Objective     Range of motion   Dorsiflexion      right 6       left   6  Hip flexion        right  97    left  112  Hamstrings       right -44   left -44    Delmis received the following manual therapy techniques: Joint mobilizations/prom were applied to the: bilateral ankles for 5 minutes, including:      Delmis participated in neuromuscular re-education activities to improve: Balance, Coordination, Proprioception, and Posture for 8 minutes. The following activities were included:  Slant board x 2'  Bilateral hamstrings stretch on step x 5  Bilateral ankle alphabet x 1    Delmis participated in dynamic functional therapeutic activities to improve functional performance for 17  minutes, including:  Biking x 5' to improve endurance with walking  Double support squats total gym x 20 to simulate bending and squatting  Double support calf raises total gym x 20 to simulate reaching on tip toes  Double support leg presses 20 x 55# to simulate squatting and bending  Double support calf presses 15 x 55# to simulate reaching on tip toes  Bilateral ankle dorsiflexion, eversion, inversion all red theraband x 15 to  promote stability with walking on unlevel surfaces.    Delmis received the following direct contact modalities after being cleared for contraindications: Ultrasound:  Delmis received ultrasound to manage pain and inflammation at 100 % duty cycle applied to the bilateral plantar fascia at an intensity of  2.0W/cm2  / 1 mhz for a duration of 15 minutes. Patient tolerated treatment well without adverse effects. Therapist was in attendance throughout intervention.    Home Exercises Provided and Patient Education Provided     Education provided: home exercise program     Written Home Exercises Provided: Patient instructed to cont prior HEP.  Exercises were reviewed and Delmis was able to demonstrate them prior to the end of the session.  Delmis demonstrated good  understanding of the education provided.     See EMR under Patient Instructions for exercises provided prior visit.    Assessment     Patient fatigued quickly with therapeutic activities requiring rest periods, patient voicing decreased tightness after treatment    Delmis Is progressing well towards her goals.   Pt prognosis is Excellent.     Pt will continue to benefit from skilled outpatient physical therapy to address the deficits listed in the problem list box on initial evaluation, provide pt/family education and to maximize pt's level of independence in the home and community environment.     Pt's spiritual, cultural and educational needs considered and pt agreeable to plan of care and goals.     Anticipated barriers to physical therapy: compliance with home exercise program     Goals:  Short Term Goals: 4 weeks   Pt will be independent with home ex program   Pt will increase hip flexion to 120 degrees bilaterally   Increase hamstring length bilaterally to -25   Be able to dorsiflexion to 10 degrees bilaterally   Decrease pain from 10/10 to 7/10 at worse      Long Term Goals: 8  weeks   Be able to walk a mile without symptoms   Be able to get out of  bed with pain less than 5/10   Return to pain free shopping      Plan   Plan of care Certification: 8/26/2024 to 10/26/2024.     Outpatient Physical Therapy 2 times weekly for 8 weeks to include the following interventions: Manual Therapy, Neuromuscular Re-ed, Patient Education, Therapeutic Activities, Ultrasound, and modalities as needed.  Ronda Rodriguez, PTA  8/29/2024

## 2024-08-30 ENCOUNTER — OFFICE VISIT (OUTPATIENT)
Dept: FAMILY MEDICINE | Facility: CLINIC | Age: 74
End: 2024-08-30
Payer: COMMERCIAL

## 2024-08-30 VITALS
RESPIRATION RATE: 20 BRPM | BODY MASS INDEX: 28.12 KG/M2 | HEART RATE: 62 BPM | WEIGHT: 168.81 LBS | OXYGEN SATURATION: 98 % | DIASTOLIC BLOOD PRESSURE: 83 MMHG | TEMPERATURE: 98 F | SYSTOLIC BLOOD PRESSURE: 132 MMHG | HEIGHT: 65 IN

## 2024-08-30 DIAGNOSIS — J01.40 ACUTE NON-RECURRENT PANSINUSITIS: Primary | ICD-10-CM

## 2024-08-30 DIAGNOSIS — M17.11 OSTEOARTHRITIS OF RIGHT KNEE, UNSPECIFIED OSTEOARTHRITIS TYPE: ICD-10-CM

## 2024-08-30 RX ORDER — AZITHROMYCIN 250 MG/1
TABLET, FILM COATED ORAL
Qty: 6 TABLET | Refills: 0 | Status: SHIPPED | OUTPATIENT
Start: 2024-08-30 | End: 2024-09-04

## 2024-08-30 RX ORDER — DEXAMETHASONE SODIUM PHOSPHATE 4 MG/ML
4 INJECTION, SOLUTION INTRA-ARTICULAR; INTRALESIONAL; INTRAMUSCULAR; INTRAVENOUS; SOFT TISSUE
Status: COMPLETED | OUTPATIENT
Start: 2024-08-30 | End: 2024-08-30

## 2024-08-30 RX ORDER — MECLIZINE HCL 12.5 MG 12.5 MG/1
12.5 TABLET ORAL 3 TIMES DAILY PRN
Qty: 60 TABLET | Refills: 1 | Status: SHIPPED | OUTPATIENT
Start: 2024-08-30

## 2024-08-30 RX ORDER — CEFTRIAXONE 1 G/1
1 INJECTION, POWDER, FOR SOLUTION INTRAMUSCULAR; INTRAVENOUS
Status: COMPLETED | OUTPATIENT
Start: 2024-08-30 | End: 2024-08-30

## 2024-08-30 RX ADMIN — DEXAMETHASONE SODIUM PHOSPHATE 4 MG: 4 INJECTION, SOLUTION INTRA-ARTICULAR; INTRALESIONAL; INTRAMUSCULAR; INTRAVENOUS; SOFT TISSUE at 10:08

## 2024-08-30 RX ADMIN — CEFTRIAXONE 1 G: 1 INJECTION, POWDER, FOR SOLUTION INTRAMUSCULAR; INTRAVENOUS at 10:08

## 2024-08-30 NOTE — PROGRESS NOTES
Nikki Bridges NP   [address] [id number]  [place]  [address]      PATIENT NAME: Delmis Shell  : 1950  DATE: 24  MRN: [id number]      Billing Provider: Nikki Bridges NP  Level of Service: IN OFFICE/OUTPT VISIT, EST, LEVL III, 20-29 MIN  Patient PCP Information       Provider PCP Type    Nikki Bridges NP General            Reason for Visit / Chief Complaint: Sinus Problem (Patient here for what she thinks is sinus infection. She is reporting ear fullness, congestion, eye pressure and occasional mucus production with yellowish. Denies having fever. )         History of Present Illness / Problem Focused Workflow     74 year old female presents to clinic with complaints of sinus problem.  Patient here for what she thinks is sinus infection. She is reporting ear fullness, congestion, eye pressure and occasional mucus production with yellowish sputum. States she has also had some dizziness due to ears feeling full. Denies having fever or body aches. Denies known sick contacts. Declines COVID/Flu swab.           Review of Systems     @Review of Systems   Constitutional:  Positive for fatigue. Negative for activity change, appetite change and fever.   HENT:  Positive for nasal congestion, rhinorrhea, sinus pressure/congestion and sore throat. Negative for ear pain.    Eyes:  Negative for pain, redness, visual disturbance and eye dryness.   Respiratory:  Positive for cough. Negative for shortness of breath.    Cardiovascular:  Negative for chest pain and leg swelling.   Gastrointestinal:  Negative for abdominal distention, abdominal pain, constipation and diarrhea.   Endocrine: Negative for cold intolerance, heat intolerance and polyuria.   Genitourinary:  Negative for bladder incontinence, dysuria, frequency and urgency.   Musculoskeletal:  Negative for arthralgias, gait problem and myalgias.   Integumentary:  Negative for color change, rash and wound.   Allergic/Immunologic:  Negative for environmental allergies and food allergies.   Neurological:  Positive for dizziness and headaches. Negative for weakness and light-headedness.   Psychiatric/Behavioral:  Negative for behavioral problems and sleep disturbance.        Medical / Social / Family History     Past Medical History:   Diagnosis Date    Arthritis     Cataract     Family history of breast cancer in sister 11/14/2022    Hyperlipidemia     Hypertension     Left hip pain     Osteoporosis     Overactive bladder     Venous insufficiency, peripheral     Vitamin D deficiency        Past Surgical History:   Procedure Laterality Date    CATARACT EXTRACTION W/ INTRAOCULAR LENS  IMPLANT, BILATERAL Bilateral     EXCISION OF BREAST LESION Right 1970    KNEE ARTHROSCOPY Right 2020    low back disc surgery  2014    in [place]    TUBAL LIGATION      VAGINAL DELIVERY  1980    VAGINAL DELIVERY  1975    VENOUS ABLATION Right 09/18/2020    Anterior Shin Varithena Ablation performed by DR. Cesar Dumont.    VENOUS ABLATION Left 09/11/2020    Lower Anterior Shin Varithen Ablation performed by Dr. Cesar Dumont.       Medications and Allergies     Medications  Outpatient Medications Marked as Taking for the 8/30/24 encounter (Office Visit) with Nikki Bridges NP   Medication Sig Dispense Refill    aspirin (ECOTRIN) 81 MG EC tablet Take 81 mg by mouth once daily.      calcium carb and citrate-vitD3 600 mg calcium- 500 unit TbSR Take 1 tablet by mouth twice a week.      docusate sodium (COLACE) 100 MG capsule Take 1 capsule by mouth once daily.      ergocalciferol (ERGOCALCIFEROL) 50,000 unit Cap Take 1 capsule (50,000 Units total) by mouth every 7 days. 12 capsule 1    fluticasone propionate (FLOVENT DISKUS) 50 mcg/actuation DsDv Inhale 1 spray into the lungs 2 (two) times a day. Controller 3 each 1    gabapentin (NEURONTIN) 300 MG capsule Take 1 capsule (300 mg total) by mouth 3 (three) times daily as needed (Neuropathy). 90 capsule 1    garlic 1,000  mg Cap Take 1 capsule by mouth once daily.      hydrOXYzine pamoate (VISTARIL) 25 MG Cap TAKE 1 CAPSULE BY MOUTH 2 TIMES A DAY AS NEEDED FOR ITCHING 180 capsule 1    ibuprofen (ADVIL,MOTRIN) 800 MG tablet Take 800 mg by mouth every 12 (twelve) hours as needed.      loratadine (CLARITIN) 10 mg tablet Take 1 tablet (10 mg total) by mouth once daily. 90 tablet 1    meloxicam (MOBIC) 15 MG tablet Take 1 tablet (15 mg total) by mouth once daily. 90 tablet 1    mupirocin (BACTROBAN) 2 % ointment Apply topically 3 (three) times daily. 22 g 0    olmesartan-hydrochlorothiazide (BENICAR HCT) 20-12.5 mg per tablet Take 1 tablet by mouth once daily. 90 tablet 0    olopatadine (PATANOL) 0.1 % ophthalmic solution Place 1 drop into both eyes 2 (two) times daily.      oxybutynin (DITROPAN) 5 MG Tab Take 1 tablet (5 mg total) by mouth 3 (three) times daily. 90 tablet 3    potassium chloride (KLOR-CON) 10 MEQ TbSR Take 10 mEq by mouth once daily.      tiZANidine (ZANAFLEX) 4 MG tablet Take 1 tablet by mouth every 8 hours as needed for muscle spasms. 30 tablet 2    [DISCONTINUED] meclizine (ANTIVERT) 12.5 mg tablet Take 1 tablet (12.5 mg total) by mouth 3 (three) times daily as needed for Dizziness. 60 tablet 1     Current Facility-Administered Medications for the 8/30/24 encounter (Office Visit) with Nikki Bridges NP   Medication Dose Route Frequency Provider Last Rate Last Admin    cefTRIAXone injection 1 g  1 g Intramuscular 1 time in Clinic/HOD Nikki Bridges NP        dexAMETHasone injection 4 mg  4 mg Intramuscular 1 time in Clinic/HOD Nikki Bridges NP           Allergies  Review of patient's allergies indicates:  No Known Allergies    Physical Examination     Vitals:    08/30/24 0929   BP: 132/83   Pulse: 62   Resp: 20   Temp: 97.7 °F (36.5 °C)     Physical Exam  Vitals and nursing note reviewed.   Constitutional:       Appearance: Normal appearance.   HENT:      Head: Normocephalic.      Right Ear: Tympanic  membrane normal.      Left Ear: Tympanic membrane normal.      Nose: Congestion and rhinorrhea present. Rhinorrhea is purulent.      Right Turbinates: Pale.      Left Turbinates: Pale.      Right Sinus: Maxillary sinus tenderness and frontal sinus tenderness present.      Left Sinus: Maxillary sinus tenderness and frontal sinus tenderness present.      Mouth/Throat:      Lips: Pink.      Mouth: Mucous membranes are moist.      Pharynx: Oropharyngeal exudate (clear post nasal drainage.) and posterior oropharyngeal erythema present.   Eyes:      Conjunctiva/sclera: Conjunctivae normal.   Cardiovascular:      Rate and Rhythm: Normal rate and regular rhythm.      Pulses: Normal pulses.      Heart sounds: Normal heart sounds.   Pulmonary:      Effort: Pulmonary effort is normal.      Breath sounds: Normal breath sounds. No wheezing or rhonchi.   Abdominal:      General: Abdomen is flat. Bowel sounds are normal. There is no distension.      Palpations: Abdomen is soft.      Tenderness: There is no abdominal tenderness.   Musculoskeletal:         General: Normal range of motion.      Cervical back: Normal range of motion.   Skin:     General: Skin is warm and dry.      Capillary Refill: Capillary refill takes less than 2 seconds.   Neurological:      General: No focal deficit present.      Mental Status: She is alert and oriented to person, place, and time. Mental status is at baseline.   Psychiatric:         Mood and Affect: Mood normal.         Behavior: Behavior normal.               Lab Results   Component Value Date    WBC 5.30 05/03/2024    HGB 10.5 (L) 05/03/2024    HCT 36.2 (L) 05/03/2024    MCV 95.0 05/03/2024     05/03/2024        CMP  Sodium   Date Value Ref Range Status   08/21/2024 137 136 - 145 mmol/L Final     Potassium   Date Value Ref Range Status   08/21/2024 4.0 3.5 - 5.1 mmol/L Final     Chloride   Date Value Ref Range Status   08/21/2024 103 98 - 107 mmol/L Final     CO2   Date Value Ref Range  Status   08/21/2024 25 21 - 32 mmol/L Final     Glucose   Date Value Ref Range Status   08/21/2024 89 74 - 106 mg/dL Final     BUN   Date Value Ref Range Status   08/21/2024 22 (H) 7 - 18 mg/dL Final     Creatinine   Date Value Ref Range Status   08/21/2024 1.16 (H) 0.55 - 1.02 mg/dL Final     Calcium   Date Value Ref Range Status   08/21/2024 9.9 8.5 - 10.1 mg/dL Final     Total Protein   Date Value Ref Range Status   08/21/2024 7.3 6.4 - 8.2 g/dL Final     Albumin   Date Value Ref Range Status   08/21/2024 3.5 3.5 - 5.0 g/dL Final     Bilirubin, Total   Date Value Ref Range Status   08/21/2024 0.8 >0.0 - 1.2 mg/dL Final     Alk Phos   Date Value Ref Range Status   08/21/2024 59 55 - 142 U/L Final     AST   Date Value Ref Range Status   08/21/2024 16 15 - 37 U/L Final     ALT   Date Value Ref Range Status   08/21/2024 22 13 - 56 U/L Final     Anion Gap   Date Value Ref Range Status   08/21/2024 13 7 - 16 mmol/L Final     eGFR   Date Value Ref Range Status   08/21/2024 49 (L) >=60 mL/min/1.73m2 Final     Procedures   Assessment and Plan (including Health Maintenance)   :    Plan:     Problem List Items Addressed This Visit          ENT    Acute non-recurrent pansinusitis - Primary    Current Assessment & Plan     Rocephin and Decadron given IM in clinic.   Zithromax as ordered and Meclizine as needed. She also reports she has Flonase at home and was encouraged to use this as well.     Reviewed pathology of current symptoms, medication side effects/risk/benefits/directions on taking medications, and worsening or persistent symptoms that require follow up in next 2-3 days. Saline/steroid nasal sprays, antihistamine use, increase fluid intake, and multivitamin/elderberry/Zinc use were recommended. May take Tylenol or Motrin as needed for pain and/or fever. Patient was instructed to take antibiotic as directed, complete entire course to avoid antibiotic resistance, and take OTC probiotic with antibiotic to prevent GI  upset. Patient verbalized understanding of treatment plan and denies any questions.          Other Visit Diagnoses       Osteoarthritis of right knee, unspecified osteoarthritis type                Health Maintenance Topics with due status: Not Due       Topic Last Completion Date    DEXA Scan 02/02/2023    Influenza Vaccine 03/11/2024    Lipid Panel 05/03/2024       Future Appointments   Date Time Provider Department Center   9/3/2024  9:30 AM Keshav Maldonado, PT [facility] [place]   9/5/2024 10:15 AM Ronda Rodriguez PTA [facility] [place]   9/10/2024  9:30 AM Keshav Maldonado, PT [facility] [place]   9/12/2024  9:30 AM Keshav Maldonado, PT [facility] [place]   10/1/2024  9:00 AM [facility], [place] [facility] [place]   11/6/2024 10:20 AM Nikki Bridges NP [facility] [place]   5/8/2025 10:00 AM [facility] NURSE [facility] [place]        Health Maintenance Due   Topic Date Due    TETANUS VACCINE  Never done    RSV Vaccine (Age 60+ and Pregnant patients) (1 - 1-dose 60+ series) Never done    Shingles Vaccine (2 of 3) 12/10/2013    COVID-19 Vaccine (6 - 2023-24 season) 09/01/2023    Mammogram  11/29/2024          Signature:  Nikki Bridges NP  [place] [address] [place]  [address]    Date of encounter: 8/30/24

## 2024-08-31 ENCOUNTER — EXTERNAL CHRONIC CARE MANAGEMENT (OUTPATIENT)
Dept: FAMILY MEDICINE | Facility: CLINIC | Age: 74
End: 2024-08-31
Payer: COMMERCIAL

## 2024-08-31 PROCEDURE — G0511 CCM/BHI BY RHC/FQHC 20MIN MO: HCPCS | Mod: ,,, | Performed by: NURSE PRACTITIONER

## 2024-09-03 ENCOUNTER — CLINICAL SUPPORT (OUTPATIENT)
Dept: REHABILITATION | Facility: HOSPITAL | Age: 74
End: 2024-09-03
Payer: COMMERCIAL

## 2024-09-03 DIAGNOSIS — M25.671 DECREASED RANGE OF MOTION OF BOTH ANKLES: Primary | ICD-10-CM

## 2024-09-03 DIAGNOSIS — M25.672 DECREASED RANGE OF MOTION OF BOTH ANKLES: Primary | ICD-10-CM

## 2024-09-03 PROCEDURE — 97530 THERAPEUTIC ACTIVITIES: CPT | Mod: PN

## 2024-09-03 PROCEDURE — 97035 APP MDLTY 1+ULTRASOUND EA 15: CPT | Mod: PN

## 2024-09-03 PROCEDURE — 97112 NEUROMUSCULAR REEDUCATION: CPT | Mod: PN

## 2024-09-03 NOTE — PROGRESS NOTES
Physical Therapy Treatment Note     Name: Delmis Shell  Clinic Number: [id number]    Therapy Diagnosis:   Encounter Diagnosis   Name Primary?    Decreased range of motion of both ankles Yes     Physician: Nikki Bridges NP    Visit Date: 9/3/2024    Physician Orders: PT Eval and Treat    Medical Diagnosis from Referral: bilateral plantar fasciitis   Evaluation Date: 8/26/2024  Updated Plan of Care Due : 10/26/2024   Authorization Period Expiration: [employer]   Plan of Care Expiration: 10/26/2024   Visit # / Visits authorized: 3/ 9  PTA Visit #:     Time In: 930  Time Out: 1015  Total Billable Time: 45 minutes    Precautions: Standard      Subjective     Pt reports: feet are better , the lateral part of left leg is bothering me ( it band)   She was compliant with home exercise program.    Response to previous treatment: less pain with walking  Functional change: ongoing    Pain: 2/10  Location: bilateral feet      Objective     Range of motion   Dorsiflexion      right 8      left   8  Hip flexion        right  108  left  112  Hamstrings       right -37   left -38        Delmis participated in neuromuscular re-education activities to improve: Balance, Coordination, Proprioception, and Posture for 8 minutes. The following activities were included:  Slant board x 2'  Bilateral hamstrings stretch on step x 5  Bilateral ankle alphabet x 1  It band stretch x 5 reps bilaterally     Delmis participated in dynamic functional therapeutic activities to improve functional performance for 17  minutes, including:  Biking x 5' to improve endurance with walking  Double support squats total gym x 20 to simulate bending and squatting  Double support calf raises total gym x 20 to simulate reaching on tip toes  Double support leg presses 20 x 55# to simulate squatting and bending  Double support calf presses 15 x 55# to simulate reaching on tip toes  Bilateral ankle dorsiflexion, eversion, inversion all red theraband x 15 to  promote stability with walking on unlevel surfaces.    Delmis received the following direct contact modalities after being cleared for contraindications: Ultrasound:  Delmis received ultrasound to manage pain and inflammation at 100 % duty cycle applied to the bilateral plantar fascia at an intensity of  2.0W/cm2  / 1 mhz for a duration of 15 minutes. Patient tolerated treatment well without adverse effects. Therapist was in attendance throughout intervention.    Home Exercises Provided and Patient Education Provided     Education provided: home exercise program     Written Home Exercises Provided: Patient instructed to cont prior HEP.  Exercises were reviewed and Delmis was able to demonstrate them prior to the end of the session.  Delmis demonstrated good  understanding of the education provided.     See EMR under Patient Instructions for exercises provided prior visit.    Assessment     Patient fatigued quickly with therapeutic activities requiring rest periods, patient voicing decreased tightness after treatment    Delmis Is progressing well towards her goals.   Pt prognosis is Excellent.     Pt will continue to benefit from skilled outpatient physical therapy to address the deficits listed in the problem list box on initial evaluation, provide pt/family education and to maximize pt's level of independence in the home and community environment.     Pt's spiritual, cultural and educational needs considered and pt agreeable to plan of care and goals.     Anticipated barriers to physical therapy: compliance with home exercise program     Goals:  Short Term Goals: 4 weeks   Pt will be independent with home ex program   Pt will increase hip flexion to 120 degrees bilaterally   Increase hamstring length bilaterally to -25   Be able to dorsiflexion to 10 degrees bilaterally   Decrease pain from 10/10 to 7/10 at worse      Long Term Goals: 8  weeks   Be able to walk a mile without symptoms   Be able to get out of  bed with pain less than 5/10   Return to pain free shopping      Plan   Plan of care Certification: 8/26/2024 to 10/26/2024.     Outpatient Physical Therapy 2 times weekly for 8 weeks to include the following interventions: Manual Therapy, Neuromuscular Re-ed, Patient Education, Therapeutic Activities, Ultrasound, and modalities as needed.  Keshav Maldonado, PT  9/3/2024

## 2024-09-05 ENCOUNTER — CLINICAL SUPPORT (OUTPATIENT)
Dept: REHABILITATION | Facility: HOSPITAL | Age: 74
End: 2024-09-05
Payer: COMMERCIAL

## 2024-09-05 DIAGNOSIS — M25.672 DECREASED RANGE OF MOTION OF BOTH ANKLES: Primary | ICD-10-CM

## 2024-09-05 DIAGNOSIS — M25.671 DECREASED RANGE OF MOTION OF BOTH ANKLES: Primary | ICD-10-CM

## 2024-09-05 PROCEDURE — 97035 APP MDLTY 1+ULTRASOUND EA 15: CPT | Mod: PN,CQ

## 2024-09-05 PROCEDURE — 97530 THERAPEUTIC ACTIVITIES: CPT | Mod: PN,CQ

## 2024-09-05 PROCEDURE — 97112 NEUROMUSCULAR REEDUCATION: CPT | Mod: PN,CQ

## 2024-09-05 NOTE — PROGRESS NOTES
Physical Therapy Treatment Note     Name: Delmis Shell  Clinic Number: [id number]    Therapy Diagnosis:   Encounter Diagnosis   Name Primary?    Decreased range of motion of both ankles Yes     Physician: Nikki Bridges NP    Visit Date: 9/5/2024    Physician Orders: PT Eval and Treat    Medical Diagnosis from Referral: bilateral plantar fasciitis   Evaluation Date: 8/26/2024  Updated Plan of Care Due : 10/26/2024   Authorization Period Expiration: wellcare   Plan of Care Expiration: 10/26/2024   Visit # / Visits authorized: 4/ 9  PTA Visit #: 1    Time In: 945  Time Out: 1030  Total Billable Time: 45 minutes    Precautions: Standard      Subjective     Pt reports: I'm able to stand better, but they're still bothersome.  She was compliant with home exercise program.    Response to previous treatment: less pain with walking  Functional change: ongoing    Pain: 2/10  Location: bilateral feet      Objective     Range of motion   Dorsiflexion      right 8      left   8  Hip flexion        right  108  left  112  Hamstrings       right -34  left -33    Delmis participated in neuromuscular re-education activities to improve: Balance, Coordination, Proprioception, and Posture for 15 minutes. The following activities were included:  Slant board x 2'  Bilateral hamstrings stretch on step x 5  Bilateral ankle alphabet x 1  It band stretch x 5 reps bilaterally     Delmis participated in dynamic functional therapeutic activities to improve functional performance for 20  minutes, including:  Biking x 5' to improve endurance with walking  Double support squats total gym x 20 to simulate bending and squatting  Double support calf raises total gym x 20 to simulate reaching on tip toes  Double support leg presses 20 x 55# to simulate squatting and bending  Double support calf presses 15 x 55# to simulate reaching on tip toes  Bilateral ankle dorsiflexion, eversion, inversion all red theraband x 15 to promote stability with  walking on unlevel surfaces.    Delmis received the following direct contact modalities after being cleared for contraindications: Ultrasound:  Delmis received ultrasound to manage pain and inflammation at 100 % duty cycle applied to the bilateral plantar fascia at an intensity of  2.0W/cm2  / 1 mhz for a duration of 10 minutes. Patient tolerated treatment well without adverse effects. Therapist was in attendance throughout intervention.    Home Exercises Provided and Patient Education Provided     Education provided: home exercise program     Written Home Exercises Provided: Patient instructed to cont prior HEP.  Exercises were reviewed and Delmis was able to demonstrate them prior to the end of the session.  Delmis demonstrated good  understanding of the education provided.     See EMR under Patient Instructions for exercises provided prior visit.    Assessment     Patient had improved hamstrings flexibility and decreased tenderness along metatarsal heads.   Delmis Is progressing well towards her goals.   Pt prognosis is Excellent.     Pt will continue to benefit from skilled outpatient physical therapy to address the deficits listed in the problem list box on initial evaluation, provide pt/family education and to maximize pt's level of independence in the home and community environment.     Pt's spiritual, cultural and educational needs considered and pt agreeable to plan of care and goals.     Anticipated barriers to physical therapy: compliance with home exercise program     Goals:  Short Term Goals: 4 weeks   Pt will be independent with home ex program   Pt will increase hip flexion to 120 degrees bilaterally   Increase hamstring length bilaterally to -25   Be able to dorsiflexion to 10 degrees bilaterally   Decrease pain from 10/10 to 7/10 at worse      Long Term Goals: 8  weeks   Be able to walk a mile without symptoms   Be able to get out of bed with pain less than 5/10   Return to pain free  shopping      Plan   Plan of care Certification: 8/26/2024 to 10/26/2024.     Outpatient Physical Therapy 2 times weekly for 8 weeks to include the following interventions: Manual Therapy, Neuromuscular Re-ed, Patient Education, Therapeutic Activities, Ultrasound, and modalities as needed.  Ronda Rodriguez, PTA  9/5/2024

## 2024-09-10 ENCOUNTER — CLINICAL SUPPORT (OUTPATIENT)
Dept: REHABILITATION | Facility: HOSPITAL | Age: 74
End: 2024-09-10
Payer: COMMERCIAL

## 2024-09-10 DIAGNOSIS — M25.672 DECREASED RANGE OF MOTION OF BOTH ANKLES: Primary | ICD-10-CM

## 2024-09-10 DIAGNOSIS — M25.671 DECREASED RANGE OF MOTION OF BOTH ANKLES: Primary | ICD-10-CM

## 2024-09-10 PROCEDURE — 97530 THERAPEUTIC ACTIVITIES: CPT | Mod: PN

## 2024-09-10 PROCEDURE — 97035 APP MDLTY 1+ULTRASOUND EA 15: CPT | Mod: PN

## 2024-09-10 NOTE — PROGRESS NOTES
Physical Therapy Treatment Note     Name: Delmis Shell  Clinic Number: [id number]    Therapy Diagnosis:   Encounter Diagnosis   Name Primary?    Decreased range of motion of both ankles Yes     Physician: Nikki Bridges NP    Visit Date: 9/10/2024    Physician Orders: PT Eval and Treat    Medical Diagnosis from Referral: bilateral plantar fasciitis   Evaluation Date: 8/26/2024  Updated Plan of Care Due : 10/26/2024   Authorization Period Expiration: wellcare   Plan of Care Expiration: 10/26/2024   Visit # / Visits authorized: 4/ 9  PTA Visit #: 1    Time In: 945  Time Out: 1030  Total Billable Time: 45 minutes    Precautions: Standard      Subjective     Pt reports: I am feeling much better   She was compliant with home exercise program.    Response to previous treatment: less pain with walking  Functional change: ongoing    Pain: 2/10  Location: bilateral feet      Objective     Range of motion   Dorsiflexion      right 10    left   10  Hip flexion        right  108  left  112  Hamstrings       right -25  left -26    Delmis participated in neuromuscular re-education activities to improve: Balance, Coordination, Proprioception, and Posture for 15 minutes. The following activities were included:  Slant board x 2'  Bilateral hamstrings stretch on step x 5  Bilateral ankle alphabet x 1  It band stretch x 5 reps bilaterally     Delmis participated in dynamic functional therapeutic activities to improve functional performance for 20  minutes, including:  Biking x 5' to improve endurance with walking  Double support squats total gym x 20 to simulate bending and squatting  Double support calf raises total gym x 20 to simulate reaching on tip toes  Double support leg presses 20 x 55# to simulate squatting and bending  Double support calf presses 15 x 55# to simulate reaching on tip toes  Bilateral ankle dorsiflexion, eversion, inversion all red theraband x 15 to promote stability with walking on unlevel  surfaces.    Delmis received the following direct contact modalities after being cleared for contraindications: Ultrasound:  Delmis received ultrasound to manage pain and inflammation at 100 % duty cycle applied to the bilateral plantar fascia at an intensity of  2.0W/cm2  / 1 mhz for a duration of 10 minutes. Patient tolerated treatment well without adverse effects. Therapist was in attendance throughout intervention.    Home Exercises Provided and Patient Education Provided     Education provided: home exercise program     Written Home Exercises Provided: Patient instructed to cont prior HEP.  Exercises were reviewed and Delmis was able to demonstrate them prior to the end of the session.  Delmis demonstrated good  understanding of the education provided.     See EMR under Patient Instructions for exercises provided prior visit.    Assessment     Patient had improved hamstrings flexibility and decreased tenderness along metatarsal heads.   Delmis Is progressing well towards her goals.   Pt prognosis is Excellent.     Pt will continue to benefit from skilled outpatient physical therapy to address the deficits listed in the problem list box on initial evaluation, provide pt/family education and to maximize pt's level of independence in the home and community environment.     Pt's spiritual, cultural and educational needs considered and pt agreeable to plan of care and goals.     Anticipated barriers to physical therapy: compliance with home exercise program     Goals:  Short Term Goals: 4 weeks   Pt will be independent with home ex program   Pt will increase hip flexion to 120 degrees bilaterally   Increase hamstring length bilaterally to -25   Be able to dorsiflexion to 10 degrees bilaterally   Decrease pain from 10/10 to 7/10 at worse      Long Term Goals: 8  weeks   Be able to walk a mile without symptoms   Be able to get out of bed with pain less than 5/10   Return to pain free shopping      Plan   Plan of  care Certification: 8/26/2024 to 10/26/2024.     Outpatient Physical Therapy 2 times weekly for 8 weeks to include the following interventions: Manual Therapy, Neuromuscular Re-ed, Patient Education, Therapeutic Activities, Ultrasound, and modalities as needed.    Plan of care has been reestablished with Lakshmi PEÑA and  Merlyn PEÑA,     Keshav Maldonado, PT  9/10/2024

## 2024-09-18 ENCOUNTER — CLINICAL SUPPORT (OUTPATIENT)
Dept: REHABILITATION | Facility: HOSPITAL | Age: 74
End: 2024-09-18
Payer: COMMERCIAL

## 2024-09-18 DIAGNOSIS — M25.671 DECREASED RANGE OF MOTION OF BOTH ANKLES: Primary | ICD-10-CM

## 2024-09-18 DIAGNOSIS — M25.672 DECREASED RANGE OF MOTION OF BOTH ANKLES: Primary | ICD-10-CM

## 2024-09-18 PROCEDURE — 97110 THERAPEUTIC EXERCISES: CPT | Mod: PN,CQ

## 2024-09-18 PROCEDURE — 97035 APP MDLTY 1+ULTRASOUND EA 15: CPT | Mod: PN,CQ

## 2024-09-18 NOTE — PROGRESS NOTES
Physical Therapy Treatment Note     Name: Delmis Shell  Clinic Number: [id number]    Therapy Diagnosis:   Encounter Diagnosis   Name Primary?    Decreased range of motion of both ankles Yes     Physician: Nikki Bridges NP    Visit Date: 9/18/2024    Physician Orders: PT Eval and Treat    Medical Diagnosis from Referral: bilateral plantar fasciitis   Evaluation Date: 8/26/2024  Updated Plan of Care Due : 10/26/2024   Authorization Period Expiration: [employer]   Plan of Care Expiration: 10/26/2024   Visit # / Visits authorized: 6/ 9  PTA Visit #: 1    Time In: 0948 (18 minutes late for appt today)  Time Out: 1016  Total Billable Time: 28 minutes     Precautions: Standard    Received Plan of Care per Keshav Maldonado PT     Subjective     Pt reports: was painful over the weekend because I did too much; better today; no pain meds taken  She was compliant with home exercise program.    Response to previous treatment: less pain with walking  Functional change: ongoing    Pain: 2/10  Location: bilateral feet      Objective     Range of motion   Dorsiflexion      right 10    left   10  Hip flexion        right  110  left  115  Hamstrings       right -15   left -18    Delmis received therapeutic exercises to develop strength and range of motion for 18 minutes including:   Bike x 4 minutes   Slant board x 2 minutes for bilateral calf stretching  Hamstring stretch on step, 3x20 second hold bilaterally  Dorsiflexion stretching, hip flexion stretching, hamstring stretching with manual assist    Delmis participated in neuromuscular re-education activities to improve: Balance, Coordination, Proprioception, and Posture for 0 minutes. The following activities were included:  Slant board x 2'  Bilateral hamstrings stretch on step, 3x20 second hold   Bilateral ankle alphabet x 1  It band stretch x 5 reps bilaterally     Delmis participated in dynamic functional therapeutic activities to improve functional performance for 0  minutes, including:  Biking x 5' to improve endurance with walking  Double support squats total gym x 20 to simulate bending and squatting  Double support calf raises total gym x 20 to simulate reaching on tip toes  Double support leg presses 20 x 55# to simulate squatting and bending  Double support calf presses 15 x 55# to simulate reaching on tip toes  Bilateral ankle dorsiflexion, eversion, inversion all red theraband x 15 to promote stability with walking on unlevel surfaces.    Delmis received the following direct contact modalities after being cleared for contraindications: Ultrasound:  Delmis received ultrasound to manage pain and inflammation at 100 % duty cycle applied to the bilateral plantar fascia at an intensity of  2.0W/cm2  / 1 mhz for a duration of 10 minutes. Patient tolerated treatment well without adverse effects. Therapist was in attendance throughout intervention.    Home Exercises Provided and Patient Education Provided     Education provided: continue current home exercise program, pain free     Written Home Exercises Provided: Patient instructed to cont prior HEP.  Exercises were reviewed and Delmis was able to demonstrate them prior to the end of the session.  Delmis demonstrated good  understanding of the education provided.     See EMR under Patient Instructions for exercises provided during sessions prn    Assessment     Improved hip flexion and hamstring range of motion measures today  Delmis Is progressing well towards her goals.   Pt prognosis is Excellent.     Pt will continue to benefit from skilled outpatient physical therapy to address the deficits listed in the problem list box on initial evaluation, provide pt/family education and to maximize pt's level of independence in the home and community environment.     Anticipated barriers to physical therapy: compliance with home exercise program     Goals:  Short Term Goals: 4 weeks   Pt will be independent with home ex program  met  Pt will increase hip flexion to 120 degrees bilaterally   Increase hamstring length bilaterally to -25 met  Be able to dorsiflexion to 10 degrees bilaterally met  Decrease pain from 10/10 to 7/10 at worse met     Long Term Goals: 8  weeks   Be able to walk a mile without symptoms   Be able to get out of bed with pain less than 5/10   Return to pain free shopping      Plan     Plan of care Certification: 8/26/2024 to 10/26/2024.     Outpatient Physical Therapy 2 times weekly for 8 weeks to include the following interventions: Manual Therapy, Neuromuscular Re-ed, Patient Education, Therapeutic Activities, Ultrasound, and modalities as needed.    Continue per Plan of Care and progress as pt able  Merlyn Parson, PTA  9/18/2024

## 2024-09-23 ENCOUNTER — CLINICAL SUPPORT (OUTPATIENT)
Dept: REHABILITATION | Facility: HOSPITAL | Age: 74
End: 2024-09-23
Payer: COMMERCIAL

## 2024-09-23 DIAGNOSIS — M25.672 DECREASED RANGE OF MOTION OF BOTH ANKLES: Primary | ICD-10-CM

## 2024-09-23 DIAGNOSIS — M25.671 DECREASED RANGE OF MOTION OF BOTH ANKLES: Primary | ICD-10-CM

## 2024-09-23 PROCEDURE — 97035 APP MDLTY 1+ULTRASOUND EA 15: CPT | Mod: PN,CQ

## 2024-09-23 PROCEDURE — 97112 NEUROMUSCULAR REEDUCATION: CPT | Mod: PN,CQ

## 2024-09-23 PROCEDURE — 97530 THERAPEUTIC ACTIVITIES: CPT | Mod: PN,CQ

## 2024-09-23 NOTE — PROGRESS NOTES
Physical Therapy Treatment Note     Name: Delmis Shell  Clinic Number: [id number]    Therapy Diagnosis:   Encounter Diagnosis   Name Primary?    Decreased range of motion of both ankles Yes     Physician: Nikki Bridges NP    Visit Date: 9/23/2024    Physician Orders: PT Eval and Treat    Medical Diagnosis from Referral: bilateral plantar fasciitis   Evaluation Date: 8/26/2024  Updated Plan of Care Due : 10/26/2024   Authorization Period Expiration: [employer]   Plan of Care Expiration: 10/26/2024   Visit # / Visits authorized: 7/ 9  PTA Visit #: 2    Time In: 930  Time Out: 115  Total Billable Time: 45 minutes     Precautions: Standard    Subjective     Pt reports: I stood a lot at [employer] yesterday and I hurt some last night. I'm ok today.  She was compliant with home exercise program.    Response to previous treatment: less pain with walking  Functional change: ongoing    Pain: 2/10  Location: bilateral feet      Objective     Range of motion   Dorsiflexion      right 11    left   12  Hip flexion        right  112  left  115  Hamstrings       right -13   left -15    Delmis participated in neuromuscular re-education activities to improve: Balance, Coordination, Proprioception, and Posture for 15 minutes. The following activities were included:  Slant board x 2'  Bilateral hamstrings stretch on step, 3x20 second hold   Bilateral ankle alphabet x 1  It band stretch x 5 reps bilaterally     Delmis participated in dynamic functional therapeutic activities to improve functional performance for 20 minutes, including:  Biking x 5' to improve endurance with walking  Double support squats total gym x 20 to simulate bending and squatting  Double support calf raises total gym x 20 to simulate reaching on tip toes  Double support leg presses 20 x 60 to simulate squatting and bending  Double support calf presses 15 x 60# to simulate reaching on tip toes  Bilateral ankle dorsiflexion, eversion, inversion all red theraband  x 15 to promote stability with walking on unlevel surfaces.    Delmis received the following direct contact modalities after being cleared for contraindications: Ultrasound:  Delmis received ultrasound to manage pain and inflammation at 100 % duty cycle applied to the bilateral plantar fascia at an intensity of  2.0W/cm2  / 1 mhz for a duration of 10 minutes. Patient tolerated treatment well without adverse effects. Therapist was in attendance throughout intervention.    Home Exercises Provided and Patient Education Provided     Education provided: continue current home exercise program, pain free     Written Home Exercises Provided: Patient instructed to cont prior HEP.  Exercises were reviewed and Delmis was able to demonstrate them prior to the end of the session.  Delmis demonstrated good  understanding of the education provided.     See EMR under Patient Instructions for exercises provided during sessions prn    Assessment     Patient voicing decreased pain 0/10 after treatment, progressing with range of motion.  Delmis Is progressing well towards her goals.   Pt prognosis is Excellent.     Pt will continue to benefit from skilled outpatient physical therapy to address the deficits listed in the problem list box on initial evaluation, provide pt/family education and to maximize pt's level of independence in the home and community environment.     Anticipated barriers to physical therapy: compliance with home exercise program     Goals:  Short Term Goals: 4 weeks   Pt will be independent with home ex program met  Pt will increase hip flexion to 120 degrees bilaterally   Increase hamstring length bilaterally to -25 met  Be able to dorsiflexion to 10 degrees bilaterally met  Decrease pain from 10/10 to 7/10 at worse met     Long Term Goals: 8  weeks   Be able to walk a mile without symptoms   Be able to get out of bed with pain less than 5/10   Return to pain free shopping      Plan     Plan of care  Certification: 8/26/2024 to 10/26/2024.     Outpatient Physical Therapy 2 times weekly for 8 weeks to include the following interventions: Manual Therapy, Neuromuscular Re-ed, Patient Education, Therapeutic Activities, Ultrasound, and modalities as needed.    Ronda Rodriguez, PTA  9/23/2024

## 2024-09-30 ENCOUNTER — EXTERNAL CHRONIC CARE MANAGEMENT (OUTPATIENT)
Dept: FAMILY MEDICINE | Facility: CLINIC | Age: 74
End: 2024-09-30
Payer: COMMERCIAL

## 2024-09-30 DIAGNOSIS — M81.0 OSTEOPOROSIS, UNSPECIFIED OSTEOPOROSIS TYPE, UNSPECIFIED PATHOLOGICAL FRACTURE PRESENCE: Primary | ICD-10-CM

## 2024-09-30 PROCEDURE — G0511 CCM/BHI BY RHC/FQHC 20MIN MO: HCPCS | Mod: ,,, | Performed by: NURSE PRACTITIONER

## 2024-10-01 ENCOUNTER — APPOINTMENT (OUTPATIENT)
Dept: LAB | Facility: HOSPITAL | Age: 74
End: 2024-10-01
Attending: NURSE PRACTITIONER
Payer: COMMERCIAL

## 2024-10-01 ENCOUNTER — INFUSION (OUTPATIENT)
Dept: INFUSION THERAPY | Facility: HOSPITAL | Age: 74
End: 2024-10-01
Attending: NURSE PRACTITIONER
Payer: COMMERCIAL

## 2024-10-01 VITALS
DIASTOLIC BLOOD PRESSURE: 75 MMHG | TEMPERATURE: 98 F | WEIGHT: 165 LBS | RESPIRATION RATE: 18 BRPM | SYSTOLIC BLOOD PRESSURE: 132 MMHG | HEART RATE: 60 BPM | BODY MASS INDEX: 27.46 KG/M2 | OXYGEN SATURATION: 96 %

## 2024-10-01 DIAGNOSIS — M81.0 AGE-RELATED OSTEOPOROSIS WITHOUT CURRENT PATHOLOGICAL FRACTURE: Primary | ICD-10-CM

## 2024-10-01 LAB — CALCIUM SERPL-MCNC: 9.6 MG/DL (ref 8.5–10.1)

## 2024-10-01 PROCEDURE — 63600175 PHARM REV CODE 636 W HCPCS: Mod: JZ,TB | Performed by: NURSE PRACTITIONER

## 2024-10-01 PROCEDURE — 96372 THER/PROPH/DIAG INJ SC/IM: CPT

## 2024-10-01 RX ADMIN — DENOSUMAB 60 MG: 60 INJECTION SUBCUTANEOUS at 10:10

## 2024-10-01 NOTE — PROGRESS NOTES
1030 Patient in room 2.  Calcium level today was 9.6      1040 Administered Prolia 60mg SQ to left upper arm per protocol.  Patient tolerated well.     1100 Patient had no adverse reactions noted at this time.  Patient discharged to home ambulatory with appt to return in 6 months for next prolia injection.

## 2024-10-04 DIAGNOSIS — M54.16 LUMBAR RADICULOPATHY: ICD-10-CM

## 2024-10-04 DIAGNOSIS — I10 HYPERTENSION, UNSPECIFIED TYPE: ICD-10-CM

## 2024-10-04 RX ORDER — OLMESARTAN MEDOXOMIL AND HYDROCHLOROTHIAZIDE 20/12.5 20; 12.5 MG/1; MG/1
1 TABLET ORAL DAILY
Qty: 90 TABLET | Refills: 1 | Status: SHIPPED | OUTPATIENT
Start: 2024-10-04

## 2024-10-04 RX ORDER — CYCLOBENZAPRINE HCL 5 MG
5 TABLET ORAL 3 TIMES DAILY PRN
Qty: 90 TABLET | Refills: 1 | Status: SHIPPED | OUTPATIENT
Start: 2024-10-04 | End: 2024-12-03

## 2024-10-21 PROBLEM — M25.671 DECREASED RANGE OF MOTION OF BOTH ANKLES: Status: RESOLVED | Noted: 2024-08-29 | Resolved: 2024-10-21

## 2024-10-21 PROBLEM — M25.672 DECREASED RANGE OF MOTION OF BOTH ANKLES: Status: RESOLVED | Noted: 2024-08-29 | Resolved: 2024-10-21

## 2024-10-31 ENCOUNTER — EXTERNAL CHRONIC CARE MANAGEMENT (OUTPATIENT)
Dept: FAMILY MEDICINE | Facility: CLINIC | Age: 74
End: 2024-10-31
Payer: COMMERCIAL

## 2024-10-31 PROCEDURE — G0511 CCM/BHI BY RHC/FQHC 20MIN MO: HCPCS | Mod: ,,, | Performed by: NURSE PRACTITIONER

## 2024-11-06 ENCOUNTER — OFFICE VISIT (OUTPATIENT)
Dept: FAMILY MEDICINE | Facility: CLINIC | Age: 74
End: 2024-11-06
Payer: COMMERCIAL

## 2024-11-06 VITALS
HEIGHT: 65 IN | OXYGEN SATURATION: 98 % | WEIGHT: 165.81 LBS | SYSTOLIC BLOOD PRESSURE: 102 MMHG | BODY MASS INDEX: 27.63 KG/M2 | RESPIRATION RATE: 17 BRPM | HEART RATE: 70 BPM | TEMPERATURE: 98 F | DIASTOLIC BLOOD PRESSURE: 65 MMHG

## 2024-11-06 DIAGNOSIS — Z13.6 ENCOUNTER FOR LIPID SCREENING FOR CARDIOVASCULAR DISEASE: ICD-10-CM

## 2024-11-06 DIAGNOSIS — I10 HYPERTENSION, UNSPECIFIED TYPE: Primary | ICD-10-CM

## 2024-11-06 DIAGNOSIS — R30.0 DYSURIA: ICD-10-CM

## 2024-11-06 DIAGNOSIS — Z13.220 ENCOUNTER FOR LIPID SCREENING FOR CARDIOVASCULAR DISEASE: ICD-10-CM

## 2024-11-06 DIAGNOSIS — H65.92 FLUID LEVEL BEHIND TYMPANIC MEMBRANE OF LEFT EAR: ICD-10-CM

## 2024-11-06 PROBLEM — J01.40 ACUTE NON-RECURRENT PANSINUSITIS: Status: RESOLVED | Noted: 2022-10-18 | Resolved: 2024-11-06

## 2024-11-06 PROCEDURE — 1159F MED LIST DOCD IN RCRD: CPT | Mod: ,,, | Performed by: NURSE PRACTITIONER

## 2024-11-06 PROCEDURE — 99213 OFFICE O/P EST LOW 20 MIN: CPT | Mod: ,,, | Performed by: NURSE PRACTITIONER

## 2024-11-06 PROCEDURE — 3288F FALL RISK ASSESSMENT DOCD: CPT | Mod: ,,, | Performed by: NURSE PRACTITIONER

## 2024-11-06 PROCEDURE — 1126F AMNT PAIN NOTED NONE PRSNT: CPT | Mod: ,,, | Performed by: NURSE PRACTITIONER

## 2024-11-06 PROCEDURE — 1101F PT FALLS ASSESS-DOCD LE1/YR: CPT | Mod: ,,, | Performed by: NURSE PRACTITIONER

## 2024-11-06 PROCEDURE — 3078F DIAST BP <80 MM HG: CPT | Mod: ,,, | Performed by: NURSE PRACTITIONER

## 2024-11-06 PROCEDURE — 3074F SYST BP LT 130 MM HG: CPT | Mod: ,,, | Performed by: NURSE PRACTITIONER

## 2024-11-06 PROCEDURE — 1160F RVW MEDS BY RX/DR IN RCRD: CPT | Mod: ,,, | Performed by: NURSE PRACTITIONER

## 2024-11-06 NOTE — PROGRESS NOTES
Nikki Bridges NP   [facility]  [address]  [place]  [address]      PATIENT NAME: Delmis Shell  : 1950  DATE: 24  MRN: [id number]      Billing Provider: Nikki Bridges NP  Level of Service: ME OFFICE/OUTPT VISIT, EST, LEVL III, 20-29 MIN  Patient PCP Information       Provider PCP Type    Nikki Bridges NP General            Reason for Visit / Chief Complaint: Follow-up (Patient is 77 year old female present to clinic for 6 month follow up. Hypertension), Dysuria (Patient reports burning and discomfort  x 10 days. ), and Ear Fullness (Patient reports water in ear x 2 days. )         History of Present Illness / Problem Focused Workflow     74 year old female presents to clinic for 6 month follow up. Hypertension  Dysuria: Patient reports burning and discomfort  x 10 days. Attempted to obtain urine and she was unable to obtain. Will send cup home with her and have her return sample to clinic.   Ear Fullness: She did just fly in from [place] a couple days ago.              Review of Systems     @Review of Systems   Constitutional:  Negative for activity change, appetite change, fatigue and fever.   HENT:  Positive for ear pain. Negative for nasal congestion, rhinorrhea, sinus pressure/congestion and sore throat.    Eyes:  Negative for pain, redness, visual disturbance and eye dryness.   Respiratory:  Negative for cough and shortness of breath.    Cardiovascular:  Negative for chest pain and leg swelling.   Gastrointestinal:  Negative for abdominal distention, abdominal pain, constipation and diarrhea.   Endocrine: Negative for cold intolerance, heat intolerance and polyuria.   Genitourinary:  Positive for dysuria, frequency and urgency. Negative for bladder incontinence.   Musculoskeletal:  Negative for arthralgias, gait problem and myalgias.   Integumentary:  Negative for color change, rash and wound.   Allergic/Immunologic: Negative for environmental allergies and food  allergies.   Neurological:  Negative for dizziness, weakness, light-headedness and headaches.   Psychiatric/Behavioral:  Negative for behavioral problems and sleep disturbance.        Medical / Social / Family History     Past Medical History:   Diagnosis Date    Arthritis     Cataract     Family history of breast cancer in sister 11/14/2022    Hyperlipidemia     Hypertension     Left hip pain     Osteoporosis     Overactive bladder     Venous insufficiency, peripheral     Vitamin D deficiency        Past Surgical History:   Procedure Laterality Date    CATARACT EXTRACTION W/ INTRAOCULAR LENS  IMPLANT, BILATERAL Bilateral     EXCISION OF BREAST LESION Right 1970    KNEE ARTHROSCOPY Right 2020    low back disc surgery  2014    in [place]    TUBAL LIGATION      VAGINAL DELIVERY  1980    VAGINAL DELIVERY  1975    VENOUS ABLATION Right 09/18/2020    Anterior Shin Varithena Ablation performed by DR. Cesar Dumont.    VENOUS ABLATION Left 09/11/2020    Lower Anterior Shin Varithen Ablation performed by Dr. Cesar Dumont.       Medications and Allergies     Medications  Outpatient Medications Marked as Taking for the 11/6/24 encounter (Office Visit) with Nikki Bridges NP   Medication Sig Dispense Refill    aspirin (ECOTRIN) 81 MG EC tablet Take 81 mg by mouth once daily.      calcium carb and citrate-vitD3 600 mg calcium- 500 unit TbSR Take 1 tablet by mouth twice a week.      cyclobenzaprine (FLEXERIL) 5 MG tablet Take 1 tablet (5 mg total) by mouth 3 (three) times daily as needed for Muscle spasms. 90 tablet 1    docusate sodium (COLACE) 100 MG capsule Take 1 capsule by mouth once daily.      ergocalciferol (ERGOCALCIFEROL) 50,000 unit Cap Take 1 capsule (50,000 Units total) by mouth every 7 days. 12 capsule 1    fluticasone propionate (FLOVENT DISKUS) 50 mcg/actuation DsDv Inhale 1 spray into the lungs 2 (two) times a day. Controller 3 each 1    gabapentin (NEURONTIN) 300 MG capsule Take 1 capsule (300 mg total) by  mouth 3 (three) times daily as needed (Neuropathy). 90 capsule 1    garlic 1,000 mg Cap Take 1 capsule by mouth once daily.      hydrOXYzine pamoate (VISTARIL) 25 MG Cap TAKE 1 CAPSULE BY MOUTH 2 TIMES A DAY AS NEEDED FOR ITCHING 180 capsule 1    ibuprofen (ADVIL,MOTRIN) 800 MG tablet Take 800 mg by mouth every 12 (twelve) hours as needed.      loratadine (CLARITIN) 10 mg tablet Take 1 tablet (10 mg total) by mouth once daily. 90 tablet 1    meclizine (ANTIVERT) 12.5 mg tablet Take 1 tablet (12.5 mg total) by mouth 3 (three) times daily as needed for Dizziness. 60 tablet 1    meloxicam (MOBIC) 15 MG tablet Take 1 tablet (15 mg total) by mouth once daily. 90 tablet 1    mupirocin (BACTROBAN) 2 % ointment Apply topically 3 (three) times daily. 22 g 0    olmesartan-hydrochlorothiazide (BENICAR HCT) 20-12.5 mg per tablet Take 1 tablet by mouth once daily. 90 tablet 1    olopatadine (PATANOL) 0.1 % ophthalmic solution Place 1 drop into both eyes 2 (two) times daily.      oxybutynin (DITROPAN) 5 MG Tab Take 1 tablet (5 mg total) by mouth 3 (three) times daily. 90 tablet 3    rosuvastatin (CRESTOR) 10 MG tablet Take 1 tablet (10 mg total) by mouth once daily. (Patient taking differently: Take 10 mg by mouth as needed.) 90 tablet 3       Allergies  Review of patient's allergies indicates:  No Known Allergies    Physical Examination     Vitals:    11/06/24 1104   BP: 102/65   Pulse: 70   Resp: 17   Temp: 97.5 °F (36.4 °C)     Physical Exam  Vitals and nursing note reviewed.   HENT:      Head: Normocephalic.      Left Ear: A middle ear effusion is present.      Nose: Nose normal.      Mouth/Throat:      Mouth: Mucous membranes are moist.      Pharynx: Oropharynx is clear.   Eyes:      Conjunctiva/sclera: Conjunctivae normal.   Cardiovascular:      Rate and Rhythm: Normal rate and regular rhythm.      Pulses: Normal pulses.      Heart sounds: Normal heart sounds.   Pulmonary:      Effort: Pulmonary effort is normal.    Abdominal:      General: Abdomen is flat. Bowel sounds are normal.      Palpations: Abdomen is soft.      Tenderness: There is abdominal tenderness in the suprapubic area. There is no right CVA tenderness or left CVA tenderness.   Musculoskeletal:         General: Normal range of motion.      Cervical back: Normal range of motion.   Skin:     General: Skin is warm and dry.      Capillary Refill: Capillary refill takes less than 2 seconds.   Neurological:      Mental Status: She is alert. Mental status is at baseline.   Psychiatric:         Mood and Affect: Mood normal.         Behavior: Behavior normal.               Lab Results   Component Value Date    WBC 5.30 05/03/2024    HGB 10.5 (L) 05/03/2024    HCT 36.2 (L) 05/03/2024    MCV 95.0 05/03/2024     05/03/2024        CMP  Sodium   Date Value Ref Range Status   08/21/2024 137 136 - 145 mmol/L Final     Potassium   Date Value Ref Range Status   08/21/2024 4.0 3.5 - 5.1 mmol/L Final     Chloride   Date Value Ref Range Status   08/21/2024 103 98 - 107 mmol/L Final     CO2   Date Value Ref Range Status   08/21/2024 25 21 - 32 mmol/L Final     Glucose   Date Value Ref Range Status   08/21/2024 89 74 - 106 mg/dL Final     BUN   Date Value Ref Range Status   08/21/2024 22 (H) 7 - 18 mg/dL Final     Creatinine   Date Value Ref Range Status   08/21/2024 1.16 (H) 0.55 - 1.02 mg/dL Final     Calcium   Date Value Ref Range Status   10/01/2024 9.6 8.5 - 10.1 mg/dL Final     Total Protein   Date Value Ref Range Status   08/21/2024 7.3 6.4 - 8.2 g/dL Final     Albumin   Date Value Ref Range Status   08/21/2024 3.5 3.5 - 5.0 g/dL Final     Bilirubin, Total   Date Value Ref Range Status   08/21/2024 0.8 >0.0 - 1.2 mg/dL Final     Alk Phos   Date Value Ref Range Status   08/21/2024 59 55 - 142 U/L Final     AST   Date Value Ref Range Status   08/21/2024 16 15 - 37 U/L Final     ALT   Date Value Ref Range Status   08/21/2024 22 13 - 56 U/L Final     Anion Gap   Date Value  Ref Range Status   08/21/2024 13 7 - 16 mmol/L Final     eGFR   Date Value Ref Range Status   08/21/2024 49 (L) >=60 mL/min/1.73m2 Final     Procedures   Assessment and Plan (including Health Maintenance)   :    Plan:     Problem List Items Addressed This Visit          ENT    Fluid level behind tympanic membrane of left ear    Current Assessment & Plan     Flonase as ordered. Decongestant as needed. Follow up if symptoms persist or worsen.             Cardiac/Vascular    Hypertension - Primary    Current Assessment & Plan     Blood pressure well controlled. Continue current medications. Follow up in 3 months or as needed.            Relevant Orders    Comprehensive Metabolic Panel    CBC Auto Differential       Renal/    Dysuria    Relevant Orders    Urine culture    POCT URINALYSIS W/O SCOPE     Other Visit Diagnoses       Encounter for lipid screening for cardiovascular disease        Relevant Orders    Lipid Panel            Health Maintenance Topics with due status: Not Due       Topic Last Completion Date    Lipid Panel 05/03/2024       Future Appointments   Date Time Provider Department Center   4/8/2025  9:00 [facility]    5/8/2025 10:00 AM [facility]        Health Maintenance Due   Topic Date Due    TETANUS VACCINE  Never done    Shingles Vaccine (2 of 3) 12/10/2013    RSV Vaccine (Age 60+ and Pregnant patients) (1 - 1-dose 75+ series) Never done    COVID-19 Vaccine (6 - 2024-25 season) 09/01/2024    Mammogram  11/29/2024    DEXA Scan  02/02/2025          Signature:  Nikki Bridges NP  [place] [address] [address]  [address]    Date of encounter: 11/6/24

## 2024-11-07 DIAGNOSIS — R30.0 DYSURIA: Primary | ICD-10-CM

## 2024-11-07 RX ORDER — NITROFURANTOIN 25; 75 MG/1; MG/1
100 CAPSULE ORAL 2 TIMES DAILY
Qty: 14 CAPSULE | Refills: 0 | Status: SHIPPED | OUTPATIENT
Start: 2024-11-07

## 2024-11-07 RX ORDER — ROSUVASTATIN CALCIUM 10 MG/1
10 TABLET, COATED ORAL EVERY OTHER DAY
Start: 2024-11-07 | End: 2025-11-07

## 2024-11-10 DIAGNOSIS — A49.8 PROTEUS MIRABILIS INFECTION: Primary | ICD-10-CM

## 2024-11-10 RX ORDER — SULFAMETHOXAZOLE AND TRIMETHOPRIM 800; 160 MG/1; MG/1
1 TABLET ORAL 2 TIMES DAILY
Qty: 14 TABLET | Refills: 0 | Status: SHIPPED | OUTPATIENT
Start: 2024-11-10

## 2024-11-11 ENCOUNTER — TELEPHONE (OUTPATIENT)
Dept: FAMILY MEDICINE | Facility: CLINIC | Age: 74
End: 2024-11-11
Payer: COMMERCIAL

## 2024-11-11 DIAGNOSIS — N30.00 ACUTE CYSTITIS WITHOUT HEMATURIA: Primary | ICD-10-CM

## 2024-11-11 NOTE — TELEPHONE ENCOUNTER
Patient notified of urine culture results. She was instructed to stop Macrobid and start Bactrim. Patient will  prescription from pharmacy. Instructed patient to drink plenty of water while taking antibiotics. She will RTC in 2 weeks just for recheck of urine. Patient voiced understanding and will call back to clinic if she has any questions.

## 2024-11-11 NOTE — TELEPHONE ENCOUNTER
----- Message from Nikki Bridges NP sent at 11/10/2024 10:55 PM CST -----  Urine culture showed Proteus Mirabilis which is resistant to the Macrobid. Stop Macrobid. Start Bactrim which I have sent in to pharmacy. Recheck urine in 2 weeks to make sure infection has resolved.

## 2024-11-30 ENCOUNTER — EXTERNAL CHRONIC CARE MANAGEMENT (OUTPATIENT)
Dept: FAMILY MEDICINE | Facility: CLINIC | Age: 74
End: 2024-11-30
Payer: COMMERCIAL

## 2024-11-30 PROCEDURE — G0511 CCM/BHI BY RHC/FQHC 20MIN MO: HCPCS | Mod: ,,, | Performed by: NURSE PRACTITIONER

## 2024-12-03 ENCOUNTER — TELEPHONE (OUTPATIENT)
Dept: FAMILY MEDICINE | Facility: CLINIC | Age: 74
End: 2024-12-03
Payer: COMMERCIAL

## 2024-12-12 RX ORDER — DENOSUMAB 60 MG/ML
60 INJECTION SUBCUTANEOUS
COMMUNITY

## 2024-12-12 RX ORDER — ROSUVASTATIN CALCIUM 10 MG/1
10 TABLET, COATED ORAL EVERY OTHER DAY
Qty: 45 TABLET | Refills: 1 | Status: SHIPPED | OUTPATIENT
Start: 2024-12-12

## 2025-01-14 ENCOUNTER — OFFICE VISIT (OUTPATIENT)
Dept: FAMILY MEDICINE | Facility: CLINIC | Age: 75
End: 2025-01-14
Payer: COMMERCIAL

## 2025-01-14 VITALS
DIASTOLIC BLOOD PRESSURE: 83 MMHG | OXYGEN SATURATION: 95 % | HEIGHT: 65 IN | RESPIRATION RATE: 18 BRPM | TEMPERATURE: 98 F | SYSTOLIC BLOOD PRESSURE: 138 MMHG | BODY MASS INDEX: 28.29 KG/M2 | WEIGHT: 169.81 LBS | HEART RATE: 71 BPM

## 2025-01-14 DIAGNOSIS — L98.9 DISORDER OF SKIN AND SUBCUTANEOUS TISSUE: ICD-10-CM

## 2025-01-14 DIAGNOSIS — R68.89 FLU-LIKE SYMPTOMS: Primary | ICD-10-CM

## 2025-01-14 DIAGNOSIS — R51.9 NONINTRACTABLE HEADACHE, UNSPECIFIED CHRONICITY PATTERN, UNSPECIFIED HEADACHE TYPE: ICD-10-CM

## 2025-01-14 DIAGNOSIS — R52 GENERALIZED BODY ACHES: ICD-10-CM

## 2025-01-14 DIAGNOSIS — E55.9 VITAMIN D DEFICIENCY: ICD-10-CM

## 2025-01-14 LAB
CTP QC/QA: YES
POC MOLECULAR INFLUENZA A AGN: NEGATIVE
POC MOLECULAR INFLUENZA B AGN: NEGATIVE

## 2025-01-14 PROCEDURE — 1101F PT FALLS ASSESS-DOCD LE1/YR: CPT | Mod: ,,, | Performed by: NURSE PRACTITIONER

## 2025-01-14 PROCEDURE — 3079F DIAST BP 80-89 MM HG: CPT | Mod: ,,, | Performed by: NURSE PRACTITIONER

## 2025-01-14 PROCEDURE — 99213 OFFICE O/P EST LOW 20 MIN: CPT | Mod: ,,, | Performed by: NURSE PRACTITIONER

## 2025-01-14 PROCEDURE — 87502 INFLUENZA DNA AMP PROBE: CPT | Mod: QW,,, | Performed by: NURSE PRACTITIONER

## 2025-01-14 PROCEDURE — 3075F SYST BP GE 130 - 139MM HG: CPT | Mod: ,,, | Performed by: NURSE PRACTITIONER

## 2025-01-14 PROCEDURE — 1160F RVW MEDS BY RX/DR IN RCRD: CPT | Mod: ,,, | Performed by: NURSE PRACTITIONER

## 2025-01-14 PROCEDURE — 1159F MED LIST DOCD IN RCRD: CPT | Mod: ,,, | Performed by: NURSE PRACTITIONER

## 2025-01-14 PROCEDURE — 3288F FALL RISK ASSESSMENT DOCD: CPT | Mod: ,,, | Performed by: NURSE PRACTITIONER

## 2025-01-14 PROCEDURE — 1126F AMNT PAIN NOTED NONE PRSNT: CPT | Mod: ,,, | Performed by: NURSE PRACTITIONER

## 2025-01-14 RX ORDER — MUPIROCIN 20 MG/G
OINTMENT TOPICAL 3 TIMES DAILY
Qty: 22 G | Refills: 0 | Status: SHIPPED | OUTPATIENT
Start: 2025-01-14

## 2025-01-14 RX ORDER — ERGOCALCIFEROL 1.25 MG/1
50000 CAPSULE ORAL
Qty: 12 CAPSULE | Refills: 1 | Status: SHIPPED | OUTPATIENT
Start: 2025-01-14 | End: 2025-01-30 | Stop reason: SDUPTHER

## 2025-01-14 RX ORDER — CYCLOBENZAPRINE HCL 5 MG
5 TABLET ORAL 3 TIMES DAILY PRN
COMMUNITY
Start: 2024-12-27

## 2025-01-14 RX ORDER — OSELTAMIVIR PHOSPHATE 75 MG/1
75 CAPSULE ORAL 2 TIMES DAILY
Qty: 10 CAPSULE | Refills: 0 | Status: SHIPPED | OUTPATIENT
Start: 2025-01-14 | End: 2025-01-19

## 2025-01-14 NOTE — PROGRESS NOTES
Nikki Bridges NP   [place] [address]  [place]  [address]      PATIENT NAME: Delmis Shell  : 1950  DATE: 25  MRN: [id number]      Billing Provider: Nikki Bridges NP  Level of Service: [place] OFFICE/OUTPT VISIT, EST, LEVL III, 20-29 MIN  Patient PCP Information       Provider PCP Type    Nikki Bridges NP General            Reason for Visit / Chief Complaint: Generalized Body Aches (Patient 74 year old female, present to clinic with generalized body aches x 3 days. ), Sore Throat (Patient states having sore throat x 3 days. ), and Chills (Patient states having chills x 3 days. )         History of Present Illness / Problem Focused Workflow     74 year old female present sto clinic with generalized body aches x 3 days.   Sore Throat: Patient states having sore throat x 3 days.   Chills: Patient states having chills x 3 days.   Daughter who lives in home with her has tested positive for the flu.           Review of Systems     @Review of Systems   Constitutional:  Positive for chills, fatigue and fever. Negative for activity change and appetite change.   HENT:  Positive for nasal congestion, rhinorrhea and sore throat. Negative for ear pain and sinus pressure/congestion.    Eyes:  Negative for pain, redness, visual disturbance and eye dryness.   Respiratory:  Positive for cough. Negative for shortness of breath.    Cardiovascular:  Negative for chest pain and leg swelling.   Gastrointestinal:  Negative for abdominal distention, abdominal pain, constipation and diarrhea.   Endocrine: Negative for cold intolerance, heat intolerance and polyuria.   Genitourinary:  Negative for bladder incontinence, dysuria, frequency and urgency.   Musculoskeletal:  Negative for arthralgias, gait problem and myalgias.   Integumentary:  Negative for color change, rash and wound.   Allergic/Immunologic: Negative for environmental allergies and food allergies.   Neurological:  Positive for  dizziness and headaches. Negative for weakness and light-headedness.   Psychiatric/Behavioral:  Negative for behavioral problems and sleep disturbance.        Medical / Social / Family History     Past Medical History:   Diagnosis Date    Arthritis     Cataract     Family history of breast cancer in sister 11/14/2022    Hyperlipidemia     Hypertension     Left hip pain     Osteoporosis     Overactive bladder     Venous insufficiency, peripheral     Vitamin D deficiency        Past Surgical History:   Procedure Laterality Date    CATARACT EXTRACTION W/ INTRAOCULAR LENS  IMPLANT, BILATERAL Bilateral     EXCISION OF BREAST LESION Right 1970    KNEE ARTHROSCOPY Right 2020    low back disc surgery  2014    in [place]    TUBAL LIGATION      VAGINAL DELIVERY  1980    VAGINAL DELIVERY  1975    VENOUS ABLATION Right 09/18/2020    Anterior Shin Varithena Ablation performed by DR. Cesar Dumont.    VENOUS ABLATION Left 09/11/2020    Lower Anterior Shin Varithen Ablation performed by Dr. Cesar Dumont.       Medications and Allergies     Medications  Outpatient Medications Marked as Taking for the 1/14/25 encounter (Office Visit) with Nikki Bridges NP   Medication Sig Dispense Refill    aspirin (ECOTRIN) 81 MG EC tablet Take 81 mg by mouth once daily.      calcium carb and citrate-vitD3 600 mg calcium- 500 unit TbSR Take 1 tablet by mouth twice a week.      cyclobenzaprine (FLEXERIL) 5 MG tablet Take 5 mg by mouth 3 (three) times daily as needed (as needed.).      denosumab (PROLIA) 60 mg/mL Syrg Inject 60 mg into the skin every 6 (six) months.      docusate sodium (COLACE) 100 MG capsule Take 1 capsule by mouth once daily.      fluticasone propionate (FLOVENT DISKUS) 50 mcg/actuation DsDv Inhale 1 spray into the lungs 2 (two) times a day. Controller 3 each 1    gabapentin (NEURONTIN) 300 MG capsule Take 1 capsule (300 mg total) by mouth 3 (three) times daily as needed (Neuropathy). 90 capsule 1    garlic 1,000 mg Cap Take 1  capsule by mouth once daily.      hydrOXYzine pamoate (VISTARIL) 25 MG Cap TAKE 1 CAPSULE BY MOUTH 2 TIMES A DAY AS NEEDED FOR ITCHING 180 capsule 1    ibuprofen (ADVIL,MOTRIN) 800 MG tablet Take 800 mg by mouth every 12 (twelve) hours as needed.      loratadine (CLARITIN) 10 mg tablet Take 1 tablet (10 mg total) by mouth once daily. 90 tablet 1    meclizine (ANTIVERT) 12.5 mg tablet Take 1 tablet (12.5 mg total) by mouth 3 (three) times daily as needed for Dizziness. 60 tablet 1    meloxicam (MOBIC) 15 MG tablet Take 1 tablet (15 mg total) by mouth once daily. 90 tablet 1    olmesartan-hydrochlorothiazide (BENICAR HCT) 20-12.5 mg per tablet Take 1 tablet by mouth once daily. 90 tablet 1    olopatadine (PATANOL) 0.1 % ophthalmic solution Place 1 drop into both eyes 2 (two) times daily.      oxybutynin (DITROPAN) 5 MG Tab Take 1 tablet (5 mg total) by mouth 3 (three) times daily. 90 tablet 3    potassium chloride (KLOR-CON) 10 MEQ TbSR Take 10 mEq by mouth once daily.      rosuvastatin (CRESTOR) 10 MG tablet Take 1 tablet (10 mg total) by mouth every other day. 45 tablet 1    triamcinolone acetonide 0.1% (KENALOG) 0.1 % cream Apply topically 2 (two) times daily. 28 g 2    [DISCONTINUED] ergocalciferol (ERGOCALCIFEROL) 50,000 unit Cap Take 1 capsule (50,000 Units total) by mouth every 7 days. 12 capsule 1    [DISCONTINUED] mupirocin (BACTROBAN) 2 % ointment Apply topically 3 (three) times daily. 22 g 0       Allergies  Review of patient's allergies indicates:  No Known Allergies    Physical Examination     Vitals:    01/14/25 1004   BP: 138/83   Pulse: 71   Resp: 18   Temp: 97.9 °F (36.6 °C)     Physical Exam  Vitals and nursing note reviewed.   Constitutional:       Appearance: Normal appearance.   HENT:      Head: Normocephalic.      Right Ear: Tympanic membrane normal.      Left Ear: Tympanic membrane normal.      Nose: Congestion and rhinorrhea present. Rhinorrhea is purulent.      Right Turbinates: Pale.       Left Turbinates: Pale.      Right Sinus: Maxillary sinus tenderness and frontal sinus tenderness present.      Left Sinus: Maxillary sinus tenderness and frontal sinus tenderness present.      Mouth/Throat:      Lips: Pink.      Mouth: Mucous membranes are moist.      Pharynx: Oropharyngeal exudate (clear post nasal drainage.) and posterior oropharyngeal erythema present.   Eyes:      Conjunctiva/sclera: Conjunctivae normal.   Cardiovascular:      Rate and Rhythm: Normal rate and regular rhythm.      Pulses: Normal pulses.      Heart sounds: Normal heart sounds.   Pulmonary:      Effort: Pulmonary effort is normal.      Breath sounds: Normal breath sounds. No wheezing or rhonchi.   Abdominal:      General: Abdomen is flat. Bowel sounds are normal. There is no distension.      Palpations: Abdomen is soft.      Tenderness: There is no abdominal tenderness.   Musculoskeletal:         General: Normal range of motion.      Cervical back: Normal range of motion.   Skin:     General: Skin is warm and dry.      Capillary Refill: Capillary refill takes less than 2 seconds.   Neurological:      General: No focal deficit present.      Mental Status: She is alert and oriented to person, place, and time. Mental status is at baseline.   Psychiatric:         Mood and Affect: Mood normal.         Behavior: Behavior normal.               Lab Results   Component Value Date    WBC 4.36 (L) 11/07/2024    HGB 11.3 (L) 11/07/2024    HCT 37.9 (L) 11/07/2024    MCV 90.5 11/07/2024     11/07/2024        CMP  Sodium   Date Value Ref Range Status   11/07/2024 140 136 - 145 mmol/L Final     Potassium   Date Value Ref Range Status   11/07/2024 4.2 3.5 - 5.1 mmol/L Final     Chloride   Date Value Ref Range Status   11/07/2024 108 (H) 98 - 107 mmol/L Final     CO2   Date Value Ref Range Status   11/07/2024 26 21 - 32 mmol/L Final     Glucose   Date Value Ref Range Status   11/07/2024 86 74 - 106 mg/dL Final     BUN   Date Value Ref Range  Status   11/07/2024 22 (H) 7 - 18 mg/dL Final     Creatinine   Date Value Ref Range Status   11/07/2024 1.05 (H) 0.55 - 1.02 mg/dL Final     Calcium   Date Value Ref Range Status   11/07/2024 9.5 8.5 - 10.1 mg/dL Final     Total Protein   Date Value Ref Range Status   11/07/2024 7.1 6.4 - 8.2 g/dL Final     Albumin   Date Value Ref Range Status   11/07/2024 3.1 (L) 3.5 - 5.0 g/dL Final     Bilirubin, Total   Date Value Ref Range Status   11/07/2024 0.4 >0.0 - 1.2 mg/dL Final     Alk Phos   Date Value Ref Range Status   11/07/2024 57 55 - 142 U/L Final     AST   Date Value Ref Range Status   11/07/2024 23 15 - 37 U/L Final     ALT   Date Value Ref Range Status   11/07/2024 23 13 - 56 U/L Final     Anion Gap   Date Value Ref Range Status   11/07/2024 10 7 - 16 mmol/L Final     eGFR   Date Value Ref Range Status   11/07/2024 55 (L) >=60 mL/min/1.73m2 Final     Procedures   Assessment and Plan (including Health Maintenance)   :    Plan:     Problem List Items Addressed This Visit          Derm    Disorder of skin and subcutaneous tissue    Relevant Medications    mupirocin (BACTROBAN) 2 % ointment       ID    Flu-like symptoms - Primary    Current Assessment & Plan       Rapid test negative. However, she is having symptoms and is living in home with daughter who is flu positive. Discussed treatment would be provided as if they had actual influenza. Reviewed pathology of current symptoms, medication side effects/risk/benefits/directions on taking medications, instructed to alternate OTC Tylenol/Motrin for fever/pain, increase fluid intake, monitor for discussed worsening symptoms that require re-evaluation in clinic or if after hours go to ER. Strict hand hygiene encouraged. Lysol surroundings. Patient is to take the Tamiflu as directed.           Relevant Orders    POCT Influenza A/B Molecular (Completed)       Endocrine    Vitamin D deficiency    Relevant Medications    ergocalciferol (ERGOCALCIFEROL) 50,000 unit Cap      Other Visit Diagnoses       Generalized body aches        Relevant Orders    POCT Influenza A/B Molecular (Completed)    Nonintractable headache, unspecified chronicity pattern, unspecified headache type        Relevant Orders    POCT Influenza A/B Molecular (Completed)            Health Maintenance Topics with due status: Not Due       Topic Last Completion Date    Lipid Panel 11/07/2024    Mammogram 12/20/2024       Future Appointments   Date Time Provider Department Center   4/8/2025  9:00 AM INFUSION, [facility] [place] [place]    5/8/2025 10:00 AM [facility] [place] [place]        Health Maintenance Due   Topic Date Due    TETANUS VACCINE  Never done    Shingles Vaccine (2 of 3) 12/10/2013    RSV Vaccine (Age 60+ and Pregnant patients) (1 - 1-dose 75+ series) Never done    COVID-19 Vaccine (6 - 2024-25 season) 09/01/2024    DEXA Scan  02/02/2025          Signature:  Nikki Bridges NP  [facility]  [address]  [address]    Date of encounter: 1/14/25

## 2025-01-15 NOTE — ASSESSMENT & PLAN NOTE
Rapid test negative. However, she is having symptoms and is living in home with daughter who is flu positive. Discussed treatment would be provided as if they had actual influenza. Reviewed pathology of current symptoms, medication side effects/risk/benefits/directions on taking medications, instructed to alternate OTC Tylenol/Motrin for fever/pain, increase fluid intake, monitor for discussed worsening symptoms that require re-evaluation in clinic or if after hours go to ER. Strict hand hygiene encouraged. Lysol surroundings. Patient is to take the Tamiflu as directed.

## 2025-01-30 ENCOUNTER — OFFICE VISIT (OUTPATIENT)
Dept: FAMILY MEDICINE | Facility: CLINIC | Age: 75
End: 2025-01-30
Payer: COMMERCIAL

## 2025-01-30 VITALS
HEIGHT: 65 IN | SYSTOLIC BLOOD PRESSURE: 138 MMHG | HEART RATE: 70 BPM | OXYGEN SATURATION: 97 % | DIASTOLIC BLOOD PRESSURE: 78 MMHG | TEMPERATURE: 98 F | BODY MASS INDEX: 28.93 KG/M2 | WEIGHT: 173.63 LBS | RESPIRATION RATE: 19 BRPM

## 2025-01-30 DIAGNOSIS — M17.11 OSTEOARTHRITIS OF RIGHT KNEE, UNSPECIFIED OSTEOARTHRITIS TYPE: ICD-10-CM

## 2025-01-30 DIAGNOSIS — M79.2 NEUROPATHIC PAIN: ICD-10-CM

## 2025-01-30 DIAGNOSIS — E55.9 VITAMIN D DEFICIENCY: ICD-10-CM

## 2025-01-30 DIAGNOSIS — M25.552 LEFT HIP PAIN: Primary | ICD-10-CM

## 2025-01-30 PROCEDURE — 96372 THER/PROPH/DIAG INJ SC/IM: CPT | Mod: JZ,,, | Performed by: NURSE PRACTITIONER

## 2025-01-30 PROCEDURE — 1101F PT FALLS ASSESS-DOCD LE1/YR: CPT | Mod: ,,, | Performed by: NURSE PRACTITIONER

## 2025-01-30 PROCEDURE — 99213 OFFICE O/P EST LOW 20 MIN: CPT | Mod: 25,,, | Performed by: NURSE PRACTITIONER

## 2025-01-30 PROCEDURE — 1125F AMNT PAIN NOTED PAIN PRSNT: CPT | Mod: ,,, | Performed by: NURSE PRACTITIONER

## 2025-01-30 PROCEDURE — 3288F FALL RISK ASSESSMENT DOCD: CPT | Mod: ,,, | Performed by: NURSE PRACTITIONER

## 2025-01-30 PROCEDURE — 3077F SYST BP >= 140 MM HG: CPT | Mod: ,,, | Performed by: NURSE PRACTITIONER

## 2025-01-30 PROCEDURE — 1159F MED LIST DOCD IN RCRD: CPT | Mod: ,,, | Performed by: NURSE PRACTITIONER

## 2025-01-30 PROCEDURE — 3078F DIAST BP <80 MM HG: CPT | Mod: ,,, | Performed by: NURSE PRACTITIONER

## 2025-01-30 RX ORDER — KETOROLAC TROMETHAMINE 30 MG/ML
30 INJECTION, SOLUTION INTRAMUSCULAR; INTRAVENOUS
Status: COMPLETED | OUTPATIENT
Start: 2025-01-30 | End: 2025-01-30

## 2025-01-30 RX ORDER — ERGOCALCIFEROL 1.25 MG/1
50000 CAPSULE ORAL
Qty: 12 CAPSULE | Refills: 1 | Status: SHIPPED | OUTPATIENT
Start: 2025-01-30

## 2025-01-30 RX ORDER — IBUPROFEN 800 MG/1
800 TABLET ORAL EVERY 12 HOURS PRN
Qty: 30 TABLET | Refills: 2 | Status: SHIPPED | OUTPATIENT
Start: 2025-01-30

## 2025-01-30 RX ORDER — ROSUVASTATIN CALCIUM 10 MG/1
10 TABLET, COATED ORAL EVERY OTHER DAY
Qty: 45 TABLET | Refills: 1 | Status: SHIPPED | OUTPATIENT
Start: 2025-01-30

## 2025-01-30 RX ORDER — MECLIZINE HCL 12.5 MG 12.5 MG/1
12.5 TABLET ORAL 3 TIMES DAILY PRN
Qty: 60 TABLET | Refills: 1 | Status: SHIPPED | OUTPATIENT
Start: 2025-01-30

## 2025-01-30 RX ORDER — GABAPENTIN 300 MG/1
300 CAPSULE ORAL 3 TIMES DAILY PRN
Qty: 90 CAPSULE | Refills: 1 | Status: SHIPPED | OUTPATIENT
Start: 2025-01-30

## 2025-01-30 RX ORDER — OXYBUTYNIN CHLORIDE 5 MG/1
5 TABLET ORAL 3 TIMES DAILY
Qty: 90 TABLET | Refills: 3 | Status: SHIPPED | OUTPATIENT
Start: 2025-01-30

## 2025-01-30 RX ORDER — KETOROLAC TROMETHAMINE 30 MG/ML
30 INJECTION, SOLUTION INTRAMUSCULAR; INTRAVENOUS
Status: DISCONTINUED | OUTPATIENT
Start: 2025-01-30 | End: 2025-01-30

## 2025-01-30 RX ORDER — HYDROXYZINE PAMOATE 25 MG/1
25 CAPSULE ORAL EVERY 6 HOURS PRN
Qty: 60 CAPSULE | Refills: 1 | Status: SHIPPED | OUTPATIENT
Start: 2025-01-30

## 2025-01-30 RX ORDER — GABAPENTIN 300 MG/1
300 CAPSULE ORAL 3 TIMES DAILY PRN
Qty: 90 CAPSULE | Refills: 1 | Status: SHIPPED | OUTPATIENT
Start: 2025-01-30 | End: 2025-01-30 | Stop reason: SDUPTHER

## 2025-01-30 RX ORDER — MECLIZINE HCL 12.5 MG 12.5 MG/1
12.5 TABLET ORAL 3 TIMES DAILY PRN
Qty: 60 TABLET | Refills: 1 | Status: SHIPPED | OUTPATIENT
Start: 2025-01-30 | End: 2025-01-30 | Stop reason: SDUPTHER

## 2025-01-30 RX ORDER — MELOXICAM 15 MG/1
15 TABLET ORAL DAILY
Qty: 90 TABLET | Refills: 1 | Status: SHIPPED | OUTPATIENT
Start: 2025-01-30

## 2025-01-30 RX ORDER — IBUPROFEN 800 MG/1
800 TABLET ORAL EVERY 12 HOURS PRN
Qty: 30 TABLET | Refills: 2 | Status: SHIPPED | OUTPATIENT
Start: 2025-01-30 | End: 2025-01-30 | Stop reason: SDUPTHER

## 2025-01-30 RX ORDER — HYDROXYZINE PAMOATE 25 MG/1
25 CAPSULE ORAL EVERY 6 HOURS PRN
Qty: 60 CAPSULE | Refills: 1 | Status: SHIPPED | OUTPATIENT
Start: 2025-01-30 | End: 2025-01-30 | Stop reason: SDUPTHER

## 2025-01-30 RX ADMIN — KETOROLAC TROMETHAMINE 30 MG: 30 INJECTION, SOLUTION INTRAMUSCULAR; INTRAVENOUS at 12:01

## 2025-01-30 NOTE — ASSESSMENT & PLAN NOTE
Xray showed no fracture. Toradol IM in clinic. Refilled her Ibuprofen 800mg as well as her Neurontin.   Patient was instructed to rest, take medications as directed, alternate ice/moist heat to area, and monitor for worsening symptoms that require immediate evaluation. Patient was instructed to follow up if symptoms persist past current treatment plan to discuss further options, such as physical therapy, referral to ortho or other diagnostic imaging. Medication side effects/risk/benefits/directions on taking medications were reviewed with patient. Patient verbalized understanding of treatment plan and denies any questions.

## 2025-01-30 NOTE — PROGRESS NOTES
Nikki Bridges NP   [facility]  [address]  [place]  [address]      PATIENT NAME: Delmis Shell  : 1950  DATE: 25  MRN: [id number]      Billing Provider: Nikki Bridges NP  Level of Service: [place] OFFICE/OUTPT VISIT, EST, LEVL III, 20-29 MIN  Patient PCP Information       Provider PCP Type    Nikki Bridges NP General            Reason for Visit / Chief Complaint: Hip Pain (Patient here for left hip pain. She is reporting falling about 2 weeks ago. Denies injury but reporting she feels like it may be a pulled muscle. She is requesting a shot for pain. )         History of Present Illness / Problem Focused Workflow     74 year old female presents to clinic with complaints of left hip pain. She reports she fell about 2 weeks ago and landing on this left hip. Initially she did not think she injured anything but now left hip is painful and pain has not improved.  She is requesting a shot for pain.   Additionally, her pharmacy has closed and she is requesting all refills be sent to [facility] [place].          Review of Systems     @Review of Systems   Constitutional:  Negative for activity change, appetite change, fatigue and fever.   HENT:  Negative for nasal congestion, ear pain, rhinorrhea, sinus pressure/congestion and sore throat.    Eyes:  Negative for pain, redness, visual disturbance and eye dryness.   Respiratory:  Negative for cough and shortness of breath.    Cardiovascular:  Negative for chest pain and leg swelling.   Gastrointestinal:  Negative for abdominal distention, abdominal pain, constipation and diarrhea.   Endocrine: Negative for cold intolerance, heat intolerance and polyuria.   Genitourinary:  Negative for bladder incontinence, dysuria, frequency and urgency.   Musculoskeletal:  Positive for arthralgias, leg pain and myalgias. Negative for gait problem.   Integumentary:  Negative for color change, rash and wound.   Allergic/Immunologic: Negative for  environmental allergies and food allergies.   Neurological:  Negative for dizziness, weakness, light-headedness and headaches.   Psychiatric/Behavioral:  Negative for behavioral problems and sleep disturbance.        Medical / Social / Family History     Past Medical History:   Diagnosis Date    Arthritis     Cataract     Family history of breast cancer in sister 11/14/2022    Hyperlipidemia     Hypertension     Left hip pain     Osteoporosis     Overactive bladder     Venous insufficiency, peripheral     Vitamin D deficiency        Past Surgical History:   Procedure Laterality Date    CATARACT EXTRACTION W/ INTRAOCULAR LENS  IMPLANT, BILATERAL Bilateral     EXCISION OF BREAST LESION Right 1970    KNEE ARTHROSCOPY Right 2020    low back disc surgery  2014    in [place]    TUBAL LIGATION      VAGINAL DELIVERY  1980    VAGINAL DELIVERY  1975    VENOUS ABLATION Right 09/18/2020    Anterior Shin Varithena Ablation performed by DR. Cesar Dumont.    VENOUS ABLATION Left 09/11/2020    Lower Anterior Shin Varithen Ablation performed by Dr. Cesar Dumont.       Medications and Allergies     Medications  Outpatient Medications Marked as Taking for the 1/30/25 encounter (Office Visit) with Nikki Bridges NP   Medication Sig Dispense Refill    aspirin (ECOTRIN) 81 MG EC tablet Take 81 mg by mouth once daily.      calcium carb and citrate-vitD3 600 mg calcium- 500 unit TbSR Take 1 tablet by mouth twice a week.      cyclobenzaprine (FLEXERIL) 5 MG tablet Take 5 mg by mouth 3 (three) times daily as needed (as needed.).      denosumab (PROLIA) 60 mg/mL Syrg Inject 60 mg into the skin every 6 (six) months.      docusate sodium (COLACE) 100 MG capsule Take 1 capsule by mouth once daily.      fluticasone propionate (FLOVENT DISKUS) 50 mcg/actuation DsDv Inhale 1 spray into the lungs 2 (two) times a day. Controller 3 each 1    garlic 1,000 mg Cap Take 1 capsule by mouth once daily.      loratadine (CLARITIN) 10 mg tablet Take 1  tablet (10 mg total) by mouth once daily. 90 tablet 1    mupirocin (BACTROBAN) 2 % ointment Apply topically 3 (three) times daily. 22 g 0    olmesartan-hydrochlorothiazide (BENICAR HCT) 20-12.5 mg per tablet Take 1 tablet by mouth once daily. 90 tablet 1    potassium chloride (KLOR-CON) 10 MEQ TbSR Take 10 mEq by mouth once daily.      [DISCONTINUED] ergocalciferol (ERGOCALCIFEROL) 50,000 unit Cap Take 1 capsule (50,000 Units total) by mouth every 7 days. 12 capsule 1    [DISCONTINUED] gabapentin (NEURONTIN) 300 MG capsule Take 1 capsule (300 mg total) by mouth 3 (three) times daily as needed (Neuropathy). 90 capsule 1    [DISCONTINUED] hydrOXYzine pamoate (VISTARIL) 25 MG Cap TAKE 1 CAPSULE BY MOUTH 2 TIMES A DAY AS NEEDED FOR ITCHING 180 capsule 1    [DISCONTINUED] ibuprofen (ADVIL,MOTRIN) 800 MG tablet Take 800 mg by mouth every 12 (twelve) hours as needed.      [DISCONTINUED] meclizine (ANTIVERT) 12.5 mg tablet Take 1 tablet (12.5 mg total) by mouth 3 (three) times daily as needed for Dizziness. 60 tablet 1    [DISCONTINUED] meloxicam (MOBIC) 15 MG tablet Take 1 tablet (15 mg total) by mouth once daily. 90 tablet 1    [DISCONTINUED] oxybutynin (DITROPAN) 5 MG Tab Take 1 tablet (5 mg total) by mouth 3 (three) times daily. 90 tablet 3    [DISCONTINUED] rosuvastatin (CRESTOR) 10 MG tablet Take 1 tablet (10 mg total) by mouth every other day. 45 tablet 1     Current Facility-Administered Medications for the 1/30/25 encounter (Office Visit) with Nikki Bridges NP   Medication Dose Route Frequency Provider Last Rate Last Admin    [COMPLETED] ketorolac injection 30 mg  30 mg Intramuscular 1 time in Clinic/HOD Nikki Bridges NP   30 mg at 01/30/25 1213    [DISCONTINUED] ketorolac injection 30 mg  30 mg Intramuscular 1 time in Clinic/HOD Nikki Bridges NP           Allergies  Review of patient's allergies indicates:  No Known Allergies    Physical Examination     Vitals:    01/30/25 1115   BP: 138/78   Pulse:     Resp:    Temp:      Physical Exam  Vitals and nursing note reviewed.   HENT:      Head: Normocephalic.      Right Ear: Tympanic membrane normal.      Left Ear: Tympanic membrane normal.      Nose: Nose normal.      Mouth/Throat:      Mouth: Mucous membranes are moist.      Pharynx: Oropharynx is clear. No posterior oropharyngeal erythema.   Eyes:      Conjunctiva/sclera: Conjunctivae normal.   Cardiovascular:      Rate and Rhythm: Normal rate and regular rhythm.      Pulses: Normal pulses.      Heart sounds: Normal heart sounds.   Pulmonary:      Effort: Pulmonary effort is normal.      Breath sounds: Normal breath sounds.   Abdominal:      General: Abdomen is flat. Bowel sounds are normal. There is no distension.      Palpations: Abdomen is soft.   Musculoskeletal:         General: No swelling.      Cervical back: Normal range of motion.      Left hip: Tenderness and bony tenderness present. Decreased range of motion.      Right lower leg: No edema.      Left lower leg: No edema.   Skin:     General: Skin is warm and dry.      Capillary Refill: Capillary refill takes less than 2 seconds.   Neurological:      Mental Status: She is alert. Mental status is at baseline.   Psychiatric:         Mood and Affect: Mood normal.         Behavior: Behavior normal.               Lab Results   Component Value Date    WBC 4.36 (L) 11/07/2024    HGB 11.3 (L) 11/07/2024    HCT 37.9 (L) 11/07/2024    MCV 90.5 11/07/2024     11/07/2024        CMP  Sodium   Date Value Ref Range Status   11/07/2024 140 136 - 145 mmol/L Final     Potassium   Date Value Ref Range Status   11/07/2024 4.2 3.5 - 5.1 mmol/L Final     Chloride   Date Value Ref Range Status   11/07/2024 108 (H) 98 - 107 mmol/L Final     CO2   Date Value Ref Range Status   11/07/2024 26 21 - 32 mmol/L Final     Glucose   Date Value Ref Range Status   11/07/2024 86 74 - 106 mg/dL Final     BUN   Date Value Ref Range Status   11/07/2024 22 (H) 7 - 18 mg/dL Final      Creatinine   Date Value Ref Range Status   11/07/2024 1.05 (H) 0.55 - 1.02 mg/dL Final     Calcium   Date Value Ref Range Status   11/07/2024 9.5 8.5 - 10.1 mg/dL Final     Total Protein   Date Value Ref Range Status   11/07/2024 7.1 6.4 - 8.2 g/dL Final     Albumin   Date Value Ref Range Status   11/07/2024 3.1 (L) 3.5 - 5.0 g/dL Final     Bilirubin, Total   Date Value Ref Range Status   11/07/2024 0.4 >0.0 - 1.2 mg/dL Final     Alk Phos   Date Value Ref Range Status   11/07/2024 57 55 - 142 U/L Final     AST   Date Value Ref Range Status   11/07/2024 23 15 - 37 U/L Final     ALT   Date Value Ref Range Status   11/07/2024 23 13 - 56 U/L Final     Anion Gap   Date Value Ref Range Status   11/07/2024 10 7 - 16 mmol/L Final     eGFR   Date Value Ref Range Status   11/07/2024 55 (L) >=60 mL/min/1.73m2 Final     Procedures   Assessment and Plan (including Health Maintenance)   :    Plan:     Problem List Items Addressed This Visit          Neuro    Neuropathic pain       Orthopedic    Left hip pain - Primary    Current Assessment & Plan     Xray showed no fracture. Toradol IM in clinic. Refilled her Ibuprofen 800mg as well as her Neurontin.   Patient was instructed to rest, take medications as directed, alternate ice/moist heat to area, and monitor for worsening symptoms that require immediate evaluation. Patient was instructed to follow up if symptoms persist past current treatment plan to discuss further options, such as physical therapy, referral to ortho or other diagnostic imaging. Medication side effects/risk/benefits/directions on taking medications were reviewed with patient. Patient verbalized understanding of treatment plan and denies any questions.           Relevant Medications    ketorolac injection 30 mg (Completed)    Other Relevant Orders    X-Ray Hip 2 or 3 views Left with Pelvis when performed (Completed)       Health Maintenance Topics with due status: Not Due       Topic Last Completion Date     Lipid Panel 11/07/2024    Mammogram 12/20/2024       Future Appointments   Date Time Provider Department Center   4/8/2025  9:00 AM INFUSION, [place] INFUSN [place]   5/8/2025 10:00 AM AWV NURSE [place] [place]        Health Maintenance Due   Topic Date Due    TETANUS VACCINE  Never done    Shingles Vaccine (2 of 3) 12/10/2013    RSV Vaccine (Age 60+ and Pregnant patients) (1 - 1-dose 75+ series) Never done    COVID-19 Vaccine (6 - 2024-25 season) 09/01/2024    DEXA Scan  02/02/2025          Signature:  Nikki Bridges NP  [facility]  [address]  [address]    Date of encounter: 1/30/25

## 2025-02-03 ENCOUNTER — HOSPITAL ENCOUNTER (EMERGENCY)
Facility: HOSPITAL | Age: 75
Discharge: HOME | End: 2025-02-03
Attending: EMERGENCY MEDICINE
Payer: COMMERCIAL

## 2025-02-03 VITALS
SYSTOLIC BLOOD PRESSURE: 166 MMHG | DIASTOLIC BLOOD PRESSURE: 71 MMHG | WEIGHT: 170 LBS | HEART RATE: 75 BPM | TEMPERATURE: 98 F | OXYGEN SATURATION: 96 % | BODY MASS INDEX: 28.32 KG/M2 | RESPIRATION RATE: 18 BRPM | HEIGHT: 65 IN

## 2025-02-03 DIAGNOSIS — T14.90XA INJURY: ICD-10-CM

## 2025-02-03 DIAGNOSIS — S81.812A LACERATION OF LEFT LOWER EXTREMITY, INITIAL ENCOUNTER: ICD-10-CM
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
## 2025-02-03 DIAGNOSIS — S89.90XA LEG INJURY: ICD-10-CM

## 2025-02-03 PROCEDURE — 96375 TX/PRO/DX INJ NEW DRUG ADDON: CPT | Mod: 59

## 2025-02-03 PROCEDURE — 63600175 PHARM REV CODE 636 W HCPCS: Performed by: EMERGENCY MEDICINE

## 2025-02-03 PROCEDURE — 63600175 PHARM REV CODE 636 W HCPCS

## 2025-02-03 PROCEDURE — 90471 IMMUNIZATION ADMIN: CPT | Performed by: EMERGENCY MEDICINE

## 2025-02-03 PROCEDURE — 96374 THER/PROPH/DIAG INJ IV PUSH: CPT | Mod: 59

## 2025-02-03 PROCEDURE — 90715 TDAP VACCINE 7 YRS/> IM: CPT | Performed by: EMERGENCY MEDICINE

## 2025-02-03 PROCEDURE — 99284 EMERGENCY DEPT VISIT MOD MDM: CPT | Mod: 25

## 2025-02-03 PROCEDURE — 12006 RPR S/N/A/GEN/TRK20.1-30.0CM: CPT

## 2025-02-03 RX ORDER — HYDROCODONE BITARTRATE AND ACETAMINOPHEN 10; 325 MG/1; MG/1
1 TABLET ORAL EVERY 6 HOURS PRN
Qty: 16 TABLET | Refills: 0 | Status: SHIPPED | OUTPATIENT
Start: 2025-02-03 | End: 2025-02-21 | Stop reason: SDUPTHER

## 2025-02-03 RX ORDER — CEFAZOLIN SODIUM 1 G/3ML
1 INJECTION, POWDER, FOR SOLUTION INTRAMUSCULAR; INTRAVENOUS
Status: COMPLETED | OUTPATIENT
Start: 2025-02-03 | End: 2025-02-03

## 2025-02-03 RX ORDER — LIDOCAINE HYDROCHLORIDE 10 MG/ML
INJECTION, SOLUTION INFILTRATION; PERINEURAL
Status: COMPLETED
Start: 2025-02-03 | End: 2025-02-03

## 2025-02-03 RX ORDER — SULFAMETHOXAZOLE AND TRIMETHOPRIM 800; 160 MG/1; MG/1
1 TABLET ORAL 2 TIMES DAILY
Qty: 20 TABLET | Refills: 0 | Status: SHIPPED | OUTPATIENT
Start: 2025-02-03 | End: 2025-02-13

## 2025-02-03 RX ORDER — FLUCONAZOLE 200 MG/1
200 TABLET ORAL DAILY
Qty: 10 TABLET | Refills: 0 | Status: SHIPPED | OUTPATIENT
Start: 2025-02-03 | End: 2025-02-26 | Stop reason: SDUPTHER

## 2025-02-03 RX ORDER — MORPHINE SULFATE 4 MG/ML
4 INJECTION, SOLUTION INTRAMUSCULAR; INTRAVENOUS
Status: COMPLETED | OUTPATIENT
Start: 2025-02-03 | End: 2025-02-03

## 2025-02-03 RX ORDER — ONDANSETRON HYDROCHLORIDE 2 MG/ML
4 INJECTION, SOLUTION INTRAVENOUS
Status: COMPLETED | OUTPATIENT
Start: 2025-02-03 | End: 2025-02-03

## 2025-02-03 RX ORDER — LIDOCAINE HYDROCHLORIDE 10 MG/ML
10 INJECTION, SOLUTION INFILTRATION; PERINEURAL
Status: COMPLETED | OUTPATIENT
Start: 2025-02-03 | End: 2025-02-03

## 2025-02-03 RX ADMIN — LIDOCAINE HYDROCHLORIDE 10 ML: 10 INJECTION, SOLUTION INFILTRATION; PERINEURAL at 04:02

## 2025-02-03 RX ADMIN — ONDANSETRON 4 MG: 2 INJECTION INTRAMUSCULAR; INTRAVENOUS at 03:02

## 2025-02-03 RX ADMIN — CLOSTRIDIUM TETANI TOXOID ANTIGEN (FORMALDEHYDE INACTIVATED), CORYNEBACTERIUM DIPHTHERIAE TOXOID ANTIGEN (FORMALDEHYDE INACTIVATED), BORDETELLA PERTUSSIS TOXOID ANTIGEN (GLUTARALDEHYDE INACTIVATED), BORDETELLA PERTUSSIS FILAMENTOUS HEMAGGLUTININ ANTIGEN (FORMALDEHYDE INACTIVATED), BORDETELLA PERTUSSIS PERTACTIN ANTIGEN, AND BORDETELLA PERTUSSIS FIMBRIAE 2/3 ANTIGEN 0.5 ML: 5; 2; 2.5; 5; 3; 5 INJECTION, SUSPENSION INTRAMUSCULAR at 03:02

## 2025-02-03 RX ADMIN — MORPHINE SULFATE 4 MG: 4 INJECTION, SOLUTION INTRAMUSCULAR; INTRAVENOUS at 03:02

## 2025-02-03 RX ADMIN — CEFAZOLIN 1 G: 330 INJECTION, POWDER, FOR SOLUTION INTRAMUSCULAR; INTRAVENOUS at 03:02
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
## 2025-02-03 NOTE — DISCHARGE INSTRUCTIONS
Apply antibiotic ointment to laceration twice a day.  Wear Ace wrap during the day.  Keep leg elevated as much as possible.  Take antibiotics and pain medication as prescribed.  Return to emergency department for increased pain, fever, swelling, red streaks, pus drainage, or other worsening or further problems.
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
## 2025-02-05 ENCOUNTER — TELEPHONE (OUTPATIENT)
Dept: EMERGENCY MEDICINE | Facility: HOSPITAL | Age: 75
End: 2025-02-05
Payer: COMMERCIAL

## 2025-02-17 ENCOUNTER — HOSPITAL ENCOUNTER (EMERGENCY)
Facility: HOSPITAL | Age: 75
Discharge: HOME | End: 2025-02-17
Payer: COMMERCIAL

## 2025-02-17 VITALS
BODY MASS INDEX: 28.32 KG/M2 | TEMPERATURE: 98 F | SYSTOLIC BLOOD PRESSURE: 133 MMHG | OXYGEN SATURATION: 99 % | HEART RATE: 66 BPM | WEIGHT: 170 LBS | DIASTOLIC BLOOD PRESSURE: 73 MMHG | RESPIRATION RATE: 16 BRPM | HEIGHT: 65 IN

## 2025-02-17 DIAGNOSIS — Z48.02 VISIT FOR SUTURE REMOVAL: Primary | ICD-10-CM

## 2025-02-17 PROCEDURE — 25000003 PHARM REV CODE 250: Performed by: NURSE PRACTITIONER

## 2025-02-17 PROCEDURE — 99999 HC NO LEVEL OF SERVICE - ED ONLY: CPT

## 2025-02-17 RX ORDER — HYDROCODONE BITARTRATE AND ACETAMINOPHEN 5; 325 MG/1; MG/1
1 TABLET ORAL
Refills: 0 | Status: COMPLETED | OUTPATIENT
Start: 2025-02-17 | End: 2025-02-17

## 2025-02-17 RX ADMIN — HYDROCODONE BITARTRATE AND ACETAMINOPHEN 1 TABLET: 5; 325 TABLET ORAL at 01:02

## 2025-02-17 NOTE — ED PROVIDER NOTES
Encounter Date: 2/17/2025       History     Chief Complaint   Patient presents with    Suture / Staple Removal     Patient is a 74-year-old  female who presents to the emergency department for suture removal to the left lower leg.  She received these 2 weeks ago.  Daughter requests a [place] refill.      Review of patient's allergies indicates:  No Known Allergies  Past Medical History:   Diagnosis Date    Arthritis     Cataract     Family history of breast cancer in sister 11/14/2022    Hyperlipidemia     Hypertension     Left hip pain     Osteoporosis     Overactive bladder     Venous insufficiency, peripheral     Vitamin D deficiency      Past Surgical History:   Procedure Laterality Date    CATARACT EXTRACTION W/ INTRAOCULAR LENS  IMPLANT, BILATERAL Bilateral     EXCISION OF BREAST LESION Right 1970    KNEE ARTHROSCOPY Right 2020    low back disc surgery  2014    in [place]    TUBAL LIGATION      VAGINAL DELIVERY  1980    VAGINAL DELIVERY  1975    VENOUS ABLATION Right 09/18/2020    Anterior Shin Varithena Ablation performed by DR. Cesar Dumont.    VENOUS ABLATION Left 09/11/2020    Lower Anterior Shin Varithen Ablation performed by Dr. Cesar Dumont.     Family History   Problem Relation Name Age of Onset    Stroke Mother      Kidney disease Father          polycystic kidney disease    Diabetes Father      Breast cancer Sister      Colon cancer Sister      Colon polyps Sister      Colon cancer Brother      Cancer Maternal Grandmother      Sudden death Maternal Grandfather      Heart disease Maternal Grandfather      No Known Problems Daughter      No Known Problems Daughter       Social History[1]  Review of Systems   All other systems reviewed and are negative.      Physical Exam     Initial Vitals [02/17/25 1317]   BP Pulse Resp Temp SpO2   133/73 66 16 97.6 °F (36.4 °C) 99 %      MAP       --         Physical Exam    Constitutional: She appears well-developed and well-nourished. She is not  diaphoretic. No distress.   HENT:   Head: Normocephalic and atraumatic.   Eyes: Pupils are equal, round, and reactive to light.   Neck: Neck supple.   Cardiovascular:  Normal rate.           Pulmonary/Chest: No respiratory distress.   Abdominal: Abdomen is soft.   Musculoskeletal:      Cervical back: Neck supple.     Neurological: She is alert and oriented to person, place, and time. GCS score is 15. GCS eye subscore is 4. GCS verbal subscore is 5. GCS motor subscore is 6.   Skin: Skin is warm and dry.   Well-healed laceration to the lower left leg.  Sutures intact.  No signs of infection.  Neurovascularly intact.   Psychiatric: She has a normal mood and affect. Her behavior is normal. Judgment and thought content normal.         Medical Screening Exam   See Full Note    ED Course   Procedures  Labs Reviewed - No data to display       Imaging Results    None          Medications   HYDROcodone-acetaminophen 5-325 mg per tablet 1 tablet (has no administration in time range)     Medical Decision Making  Patient is a 74-year-old  female who presents to the emergency department for suture removal to the left lower leg.  She received these 2 weeks ago.  Daughter requests a Norco refill.  We will remove the sutures today as they have been in for 2 weeks and are ready to come out.  I will give her a Norco here for pain.    Risk  Prescription drug management.                                      Clinical Impression:   Final diagnoses:  [Z48.02] Visit for suture removal (Primary)        ED Disposition Condition    Discharge Stable          ED Prescriptions    None       Follow-up Information       Follow up With Specialties Details Why Contact Info    Nikki Bridges NP Family Medicine Call in 2 days  [address] [place] [id number] [phone]      [facility] - Emergency Department Emergency Medicine  As needed, If symptoms worsen [address] [place] [address]  [id number]  [phone]             Jess Rodriguez, [facility]  02/17/25 1339         [1]   Social History  Tobacco Use    Smoking status: Never     Passive exposure: Never    Smokeless tobacco: Never   Substance Use Topics    Alcohol use: Not Currently    Drug use: Never        Jess Rodriguez, [facility]  02/17/25 3250

## 2025-02-17 NOTE — ED NOTES
26 sutures removed from left left. 8 steri strips were places across wound and wound was dressed.  Pt and daughter educated to keep wound clean and dry.

## 2025-02-21 ENCOUNTER — OFFICE VISIT (OUTPATIENT)
Dept: FAMILY MEDICINE | Facility: CLINIC | Age: 75
End: 2025-02-21
Payer: COMMERCIAL

## 2025-02-21 VITALS
WEIGHT: 170.38 LBS | TEMPERATURE: 98 F | RESPIRATION RATE: 19 BRPM | OXYGEN SATURATION: 98 % | HEART RATE: 73 BPM | BODY MASS INDEX: 28.39 KG/M2 | HEIGHT: 65 IN | SYSTOLIC BLOOD PRESSURE: 132 MMHG | DIASTOLIC BLOOD PRESSURE: 62 MMHG
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
## 2025-02-21 DIAGNOSIS — L98.9 DISORDER OF SKIN AND SUBCUTANEOUS TISSUE: ICD-10-CM

## 2025-02-21 DIAGNOSIS — S81.802D WOUND OF LEFT LOWER EXTREMITY, SUBSEQUENT ENCOUNTER: Primary | ICD-10-CM

## 2025-02-21 PROBLEM — S81.802A WOUND OF LEFT LEG: Status: ACTIVE | Noted: 2025-02-21

## 2025-02-21 RX ORDER — HYDROCODONE BITARTRATE AND ACETAMINOPHEN 10; 325 MG/1; MG/1
1 TABLET ORAL EVERY 6 HOURS PRN
Qty: 16 TABLET | Refills: 0 | Status: SHIPPED | OUTPATIENT
Start: 2025-02-21 | End: 2025-02-21

## 2025-02-21 RX ORDER — MUPIROCIN 20 MG/G
OINTMENT TOPICAL 3 TIMES DAILY
Qty: 22 G | Refills: 0 | Status: SHIPPED | OUTPATIENT
Start: 2025-02-21

## 2025-02-21 RX ORDER — MUPIROCIN 20 MG/G
OINTMENT TOPICAL 3 TIMES DAILY
Qty: 22 G | Refills: 0 | Status: SHIPPED | OUTPATIENT
Start: 2025-02-21 | End: 2025-02-21

## 2025-02-21 RX ORDER — HYDROCODONE BITARTRATE AND ACETAMINOPHEN 10; 325 MG/1; MG/1
1 TABLET ORAL EVERY 6 HOURS PRN
Qty: 16 TABLET | Refills: 0 | Status: SHIPPED | OUTPATIENT
Start: 2025-02-21

## 2025-02-21 RX ORDER — CLINDAMYCIN HYDROCHLORIDE 300 MG/1
300 CAPSULE ORAL 3 TIMES DAILY
Qty: 30 CAPSULE | Refills: 0 | Status: SHIPPED | OUTPATIENT
Start: 2025-02-21

## 2025-02-21 RX ORDER — CLINDAMYCIN HYDROCHLORIDE 300 MG/1
300 CAPSULE ORAL 3 TIMES DAILY
Qty: 30 CAPSULE | Refills: 0 | Status: SHIPPED | OUTPATIENT
Start: 2025-02-21 | End: 2025-02-21

## 2025-02-21 NOTE — PROGRESS NOTES
Called and spoke with Pharmacy Tech @[facility]   [phone]    in [place] to cancel Norco . Medication cancelled

## 2025-02-21 NOTE — ASSESSMENT & PLAN NOTE
When compared to photos obtained in ER 2/17/25 wound appears wider. I did place more steri strips. Culture obtained, she had some brownish drainage. Will start her on Clindamycin empirically and follow up with culture results. She was instructed at length to stay off of her feet to help edema and encourage wound healing. Will have home health admit for assessment of wound. Will also send to [facility] for help with wound care. In the meantime- clean with mild soap and water, pat dry. Apply mupirocin ointment and cover with dry bandage.   She is having a significant amount of pain, worse at night. Will send in a few more Norco for pain management but advised this would not be a long term medicaition.  checked and is good.
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
## 2025-02-26 ENCOUNTER — RESULTS FOLLOW-UP (OUTPATIENT)
Dept: FAMILY MEDICINE | Facility: CLINIC | Age: 75
End: 2025-02-26

## 2025-02-26 RX ORDER — FLUCONAZOLE 200 MG/1
200 TABLET ORAL DAILY
Qty: 7 TABLET | Refills: 0 | Status: SHIPPED | OUTPATIENT
Start: 2025-02-26 | End: 2025-02-27 | Stop reason: SDUPTHER

## 2025-02-27 RX ORDER — FLUCONAZOLE 200 MG/1
200 TABLET ORAL DAILY
Qty: 7 TABLET | Refills: 0 | Status: SHIPPED | OUTPATIENT
Start: 2025-02-27 | End: 2025-03-29

## 2025-03-07 ENCOUNTER — OFFICE VISIT (OUTPATIENT)
Dept: FAMILY MEDICINE | Facility: CLINIC | Age: 75
End: 2025-03-07
Payer: COMMERCIAL

## 2025-03-07 VITALS
WEIGHT: 167.63 LBS | HEART RATE: 101 BPM | BODY MASS INDEX: 27.93 KG/M2 | TEMPERATURE: 99 F | OXYGEN SATURATION: 98 % | SYSTOLIC BLOOD PRESSURE: 118 MMHG | HEIGHT: 65 IN | DIASTOLIC BLOOD PRESSURE: 70 MMHG | RESPIRATION RATE: 19 BRPM

## 2025-03-07 DIAGNOSIS — S81.802S WOUND OF LEFT LOWER EXTREMITY, SEQUELA: Primary | ICD-10-CM

## 2025-03-07 NOTE — PROGRESS NOTES
"   Nikki Bridges NP   [place] [address]  [place]  [address]      PATIENT NAME: Delmis Shell  : 1950  DATE: 3/7/25  MRN: [id number]      Billing Provider: Nikki Bridges NP  Level of Service: PA OFFICE/OUTPT VISIT, EST, LEVL III, 20-29 MIN  Patient PCP Information       Provider PCP Type    Nikki Bridges NP General            Reason for Visit / Chief Complaint: Wound Care (Patient here for wound care to left lower leg. She is still taking antibiotics and has home health 2 times weekly and PT comes to home 2 times weekly. All other days dressing is changed by family. Wound has greenish drainage and warmth, family is reporting odor to site. Pain "7") and Knee Pain (Pt complaining of right knee pain and swelling. States since having injury to left leg she has compensating and using right leg more. )         History of Present Illness / Problem Focused Workflow     75 year old female presents to clinic after her regular home health nurse came out today and noticed wound had declined. Patient states she is still taking Clindamycin. She was referred to [facility] on 25 but states no one has come out to admit from wound care. Home health and family have been cleaning with NS and applying mupirocin ointment daily per their report. Family noticed over the last few days that the wound has greenish drainage and is developing an odor. Today she had lower blood pressures and some dizziness. Home health instructed them to come in to clinic to be evaluated. Patient is rating pain as "7/10" to LLE.         Review of Systems     @Review of Systems   Constitutional:  Positive for fever. Negative for activity change, appetite change and fatigue.   HENT:  Negative for nasal congestion, ear pain, rhinorrhea, sinus pressure/congestion and sore throat.    Eyes:  Negative for pain, redness, visual disturbance and eye dryness.   Respiratory:  Negative for cough and shortness of " breath.    Cardiovascular:  Negative for chest pain and leg swelling.   Gastrointestinal:  Negative for abdominal distention, abdominal pain, constipation and diarrhea.   Endocrine: Negative for cold intolerance, heat intolerance and polyuria.   Genitourinary:  Negative for bladder incontinence, dysuria, frequency and urgency.   Musculoskeletal:  Negative for arthralgias, gait problem and myalgias.   Integumentary:  Positive for wound. Negative for color change and rash.   Allergic/Immunologic: Negative for environmental allergies and food allergies.   Neurological:  Positive for dizziness and headaches. Negative for weakness and light-headedness.   Psychiatric/Behavioral:  Negative for behavioral problems and sleep disturbance.        Medical / Social / Family History     Past Medical History:   Diagnosis Date    Arthritis     Cataract     Family history of breast cancer in sister 11/14/2022    Hyperlipidemia     Hypertension     Left hip pain     Osteoporosis     Overactive bladder     Venous insufficiency, peripheral     Vitamin D deficiency        Past Surgical History:   Procedure Laterality Date    CATARACT EXTRACTION W/ INTRAOCULAR LENS  IMPLANT, BILATERAL Bilateral     EXCISION OF BREAST LESION Right 1970    KNEE ARTHROSCOPY Right 2020    low back disc surgery  2014    in [place]    TUBAL LIGATION      VAGINAL DELIVERY  1980    VAGINAL DELIVERY  1975    VENOUS ABLATION Right 09/18/2020    Anterior Shin Varithena Ablation performed by DR. Cesar Dumont.    VENOUS ABLATION Left 09/11/2020    Lower Anterior Shin Varithen Ablation performed by Dr. Cesar Dumont.       Medications and Allergies     Medications  Outpatient Medications Marked as Taking for the 3/7/25 encounter (Office Visit) with Nikki Bridges NP   Medication Sig Dispense Refill    aspirin (ECOTRIN) 81 MG EC tablet Take 81 mg by mouth once daily.      calcium carb and citrate-vitD3 600 mg calcium- 500 unit TbSR Take 1 tablet by mouth twice a  week.      clindamycin (CLEOCIN) 300 MG capsule Take 1 capsule (300 mg total) by mouth 3 (three) times daily. 30 capsule 0    denosumab (PROLIA) 60 mg/mL Syrg Inject 60 mg into the skin every 6 (six) months.      docusate sodium (COLACE) 100 MG capsule Take 1 capsule by mouth once daily.      ergocalciferol (ERGOCALCIFEROL) 50,000 unit Cap Take 1 capsule (50,000 Units total) by mouth every 7 days. 12 capsule 1    fluconazole (DIFLUCAN) 200 MG Tab Take 1 tablet (200 mg total) by mouth once daily. 7 tablet 0    fluticasone propionate (FLOVENT DISKUS) 50 mcg/actuation DsDv Inhale 1 spray into the lungs 2 (two) times a day. Controller 3 each 1    gabapentin (NEURONTIN) 300 MG capsule Take 1 capsule (300 mg total) by mouth 3 (three) times daily as needed (Neuropathy). 90 capsule 1    garlic 1,000 mg Cap Take 1 capsule by mouth once daily.      HYDROcodone-acetaminophen (NORCO)  mg per tablet Take 1 tablet by mouth every 6 (six) hours as needed for Pain. 16 tablet 0    hydrOXYzine pamoate (VISTARIL) 25 MG Cap Take 1 capsule (25 mg total) by mouth every 6 (six) hours as needed (anxiety). 60 capsule 1    ibuprofen (ADVIL,MOTRIN) 800 MG tablet Take 1 tablet (800 mg total) by mouth every 12 (twelve) hours as needed for Pain. 30 tablet 2    loratadine (CLARITIN) 10 mg tablet Take 1 tablet (10 mg total) by mouth once daily. 90 tablet 1    meclizine (ANTIVERT) 12.5 mg tablet Take 1 tablet (12.5 mg total) by mouth 3 (three) times daily as needed for Dizziness. 60 tablet 1    meloxicam (MOBIC) 15 MG tablet Take 1 tablet (15 mg total) by mouth once daily. 90 tablet 1    mupirocin (BACTROBAN) 2 % ointment Apply topically 3 (three) times daily. 22 g 0    olmesartan-hydrochlorothiazide (BENICAR HCT) 20-12.5 mg per tablet Take 1 tablet by mouth once daily. 90 tablet 1    oxybutynin (DITROPAN) 5 MG Tab Take 1 tablet (5 mg total) by mouth 3 (three) times daily. 90 tablet 3    potassium chloride (KLOR-CON) 10 MEQ TbSR Take 10 mEq by  mouth once daily.      rosuvastatin (CRESTOR) 10 MG tablet Take 1 tablet (10 mg total) by mouth every other day. 45 tablet 1    triamcinolone acetonide 0.1% (KENALOG) 0.1 % cream Apply topically 2 (two) times daily. 28 g 2       Allergies  Review of patient's allergies indicates:  No Known Allergies    Physical Examination     Vitals:    03/07/25 1558   BP: 118/70   Pulse: 101   Resp: 19   Temp: 99.1 °F (37.3 °C)     Physical Exam  Vitals and nursing note reviewed.   HENT:      Head: Normocephalic.      Nose: Nose normal.      Mouth/Throat:      Mouth: Mucous membranes are moist.      Pharynx: Oropharynx is clear. No posterior oropharyngeal erythema.   Eyes:      Conjunctiva/sclera: Conjunctivae normal.   Cardiovascular:      Rate and Rhythm: Normal rate and regular rhythm.      Pulses: Normal pulses.      Heart sounds: Normal heart sounds.   Pulmonary:      Effort: Pulmonary effort is normal.      Breath sounds: Normal breath sounds.   Abdominal:      General: Abdomen is flat. Bowel sounds are normal. There is no distension.      Palpations: Abdomen is soft.   Musculoskeletal:         General: No swelling or tenderness. Normal range of motion.      Cervical back: Normal range of motion.      Right lower leg: No edema.      Left lower leg: No edema.   Skin:     General: Skin is warm and dry.      Capillary Refill: Capillary refill takes less than 2 seconds.      Findings: Wound present.      Comments: Wound to left lower ext with significant decline since last visit. Greenish drainage and odor, wound appears to have deepened. Necrotic tissue filling wound bed. Edges rolled under. Warmth surrounding.    Neurological:      Mental Status: She is alert. Mental status is at baseline.   Psychiatric:         Mood and Affect: Mood normal.         Behavior: Behavior normal.               Lab Results   Component Value Date    WBC 4.36 (L) 11/07/2024    HGB 11.3 (L) 11/07/2024    HCT 37.9 (L) 11/07/2024    MCV 90.5 11/07/2024      11/07/2024        CMP  Sodium   Date Value Ref Range Status   11/07/2024 140 136 - 145 mmol/L Final     Potassium   Date Value Ref Range Status   11/07/2024 4.2 3.5 - 5.1 mmol/L Final     Chloride   Date Value Ref Range Status   11/07/2024 108 (H) 98 - 107 mmol/L Final     CO2   Date Value Ref Range Status   11/07/2024 26 21 - 32 mmol/L Final     Glucose   Date Value Ref Range Status   11/07/2024 86 74 - 106 mg/dL Final     BUN   Date Value Ref Range Status   11/07/2024 22 (H) 7 - 18 mg/dL Final     Creatinine   Date Value Ref Range Status   11/07/2024 1.05 (H) 0.55 - 1.02 mg/dL Final     Calcium   Date Value Ref Range Status   11/07/2024 9.5 8.5 - 10.1 mg/dL Final     Total Protein   Date Value Ref Range Status   11/07/2024 7.1 6.4 - 8.2 g/dL Final     Albumin   Date Value Ref Range Status   11/07/2024 3.1 (L) 3.5 - 5.0 g/dL Final     Bilirubin, Total   Date Value Ref Range Status   11/07/2024 0.4 >0.0 - 1.2 mg/dL Final     Alk Phos   Date Value Ref Range Status   11/07/2024 57 55 - 142 U/L Final     AST   Date Value Ref Range Status   11/07/2024 23 15 - 37 U/L Final     ALT   Date Value Ref Range Status   11/07/2024 23 13 - 56 U/L Final     Anion Gap   Date Value Ref Range Status   11/07/2024 10 7 - 16 mmol/L Final     eGFR   Date Value Ref Range Status   11/07/2024 55 (L) >=60 mL/min/1.73m2 Final     Procedures   Assessment and Plan (including Health Maintenance)   :    Plan:     Problem List Items Addressed This Visit       Wound of left leg - Primary    Current Assessment & Plan   Wound to left lower ext with significant decline since last visit. Greenish drainage and odor, wound appears to have deepened. Necrotic tissue filling wound bed. Edges rolled under. Warmth surrounding. Will send to ER due to need for possible surgical intervention and IV antibiotics.             Health Maintenance Topics with due status: Not Due       Topic Last Completion Date    Lipid Panel 11/07/2024    Mammogram  12/20/2024    TETANUS VACCINE 02/03/2025       Future Appointments   Date Time Provider Department Center   3/10/2025  2:00 PM Nikki Bridges NP [facility] FAMMED [place]   3/12/2025  1:40 PM Pablo Torres MD [facility] ORTHO [facility]   3/20/2025  9:00 AM [facility] [place]   4/8/2025  9:00 [facility]   5/8/2025 10:00 AM AWV NURSE [facility] FAMMED [place]        Health Maintenance Due   Topic Date Due    Shingles Vaccine (2 of 3) 12/10/2013    RSV Vaccine (Age 60+ and Pregnant patients) (1 - 1-dose 75+ series) Never done    COVID-19 Vaccine (6 - 2024-25 season) 09/01/2024    DEXA Scan  02/02/2025          Signature:  Nikki Bridges NP  [facility]  [address] [place] [address], [place]  [address]    Date of encounter: 3/7/25

## 2025-03-07 NOTE — ASSESSMENT & PLAN NOTE
Wound to left lower ext with significant decline since last visit. Greenish drainage and odor, wound appears to have deepened. Necrotic tissue filling wound bed. Edges rolled under. Warmth surrounding. Will send to ER due to need for possible surgical intervention and IV antibiotics.

## 2025-03-07 NOTE — PROGRESS NOTES
Report called to BENJAMIN Elkins at [facility]. Family to transport patient via POV.

## 2025-03-10 ENCOUNTER — TELEPHONE (OUTPATIENT)
Dept: FAMILY MEDICINE | Facility: CLINIC | Age: 75
End: 2025-03-10
Payer: COMMERCIAL

## 2025-03-10 NOTE — TELEPHONE ENCOUNTER
Patients daughter (Jaci) just called to let us know she is currently in surgery at [facility], they are going in to clean out the wound, she will have wound care 2 x weekly.  She just wanted to let us know.

## 2025-03-11 ENCOUNTER — TELEPHONE (OUTPATIENT)
Dept: FAMILY MEDICINE | Facility: CLINIC | Age: 75
End: 2025-03-11
Payer: COMMERCIAL

## 2025-03-11 DIAGNOSIS — M81.0 AGE-RELATED OSTEOPOROSIS WITHOUT CURRENT PATHOLOGICAL FRACTURE: Primary | ICD-10-CM

## 2025-03-11 NOTE — TELEPHONE ENCOUNTER
----- Message from BENJAMIN Leonard sent at 3/11/2025  9:08 AM CDT -----  Regarding: Bone Density  Pt is scheduled for Prolia injection on 4/8/25 and will need a bone density.  Last done 2/2/2023.

## 2025-03-13 ENCOUNTER — TELEPHONE (OUTPATIENT)
Dept: FAMILY MEDICINE | Facility: CLINIC | Age: 75
End: 2025-03-13
Payer: COMMERCIAL

## 2025-03-13 NOTE — TELEPHONE ENCOUNTER
----- Message from Annie sent at 3/13/2025  3:57 PM CDT -----  Regarding: call back  Patient called to speak to provider, she states she's in the hospital and would like a call back.

## 2025-03-13 NOTE — TELEPHONE ENCOUNTER
Patient's  called about switching home health care from one company to another this morning. I spoke with patient's  this afternoon and he stated the nurse's at the hospital( pt is in the hospital) and his daughter have taken care of the home health question he had.

## 2025-03-13 NOTE — TELEPHONE ENCOUNTER
Spoke with patient at [facility]. Patient just wanted to let her Nikki( PCP) know she was feeling better and they are planning on discharging after home health in place and other outpatient services. Nikki spoke with patient on the phone.

## 2025-03-17 ENCOUNTER — EXTERNAL HOSPITAL ADMISSION (OUTPATIENT)
Dept: ADMINISTRATIVE | Facility: CLINIC | Age: 75
End: 2025-03-17
Payer: COMMERCIAL

## 2025-03-17 ENCOUNTER — PATIENT OUTREACH (OUTPATIENT)
Dept: ADMINISTRATIVE | Facility: CLINIC | Age: 75
End: 2025-03-17
Payer: COMMERCIAL

## 2025-03-17 NOTE — PROGRESS NOTES
C3 nurse spoke with Delmis Shell  for a TCC post hospital discharge follow up call. The patient has a scheduled [facility] appointment with Nikki Bridges NP  on 3/20/25 @ 0900.  Mediation changes:  (N) levaquin 500mg take 1 tablet by mouth daily x10 days  (N) lidocaine patch 5% apply to skin (leg) once daily

## 2025-03-20 ENCOUNTER — OFFICE VISIT (OUTPATIENT)
Dept: FAMILY MEDICINE | Facility: CLINIC | Age: 75
End: 2025-03-20
Payer: COMMERCIAL

## 2025-03-20 VITALS
BODY MASS INDEX: 28.86 KG/M2 | TEMPERATURE: 97 F | SYSTOLIC BLOOD PRESSURE: 110 MMHG | HEART RATE: 88 BPM | RESPIRATION RATE: 19 BRPM | OXYGEN SATURATION: 97 % | HEIGHT: 65 IN | WEIGHT: 173.19 LBS | DIASTOLIC BLOOD PRESSURE: 60 MMHG

## 2025-03-20 DIAGNOSIS — E55.9 VITAMIN D DEFICIENCY: ICD-10-CM

## 2025-03-20 DIAGNOSIS — Z78.9 IMPAIRED MOBILITY AND ACTIVITIES OF DAILY LIVING: ICD-10-CM

## 2025-03-20 DIAGNOSIS — S81.802S WOUND OF LEFT LOWER EXTREMITY, SEQUELA: Primary | ICD-10-CM

## 2025-03-20 DIAGNOSIS — Z74.09 IMPAIRED MOBILITY AND ACTIVITIES OF DAILY LIVING: ICD-10-CM

## 2025-03-20 DIAGNOSIS — R26.89 IMPAIRED GAIT AND MOBILITY: ICD-10-CM

## 2025-03-20 PROCEDURE — 99213 OFFICE O/P EST LOW 20 MIN: CPT | Mod: ,,, | Performed by: NURSE PRACTITIONER

## 2025-03-20 PROCEDURE — 3074F SYST BP LT 130 MM HG: CPT | Mod: ,,, | Performed by: NURSE PRACTITIONER

## 2025-03-20 PROCEDURE — 1100F PTFALLS ASSESS-DOCD GE2>/YR: CPT | Mod: ,,, | Performed by: NURSE PRACTITIONER

## 2025-03-20 PROCEDURE — 3288F FALL RISK ASSESSMENT DOCD: CPT | Mod: ,,, | Performed by: NURSE PRACTITIONER

## 2025-03-20 PROCEDURE — 3078F DIAST BP <80 MM HG: CPT | Mod: ,,, | Performed by: NURSE PRACTITIONER

## 2025-03-20 PROCEDURE — 1159F MED LIST DOCD IN RCRD: CPT | Mod: ,,, | Performed by: NURSE PRACTITIONER

## 2025-03-20 PROCEDURE — 1125F AMNT PAIN NOTED PAIN PRSNT: CPT | Mod: ,,, | Performed by: NURSE PRACTITIONER

## 2025-03-20 RX ORDER — LEVOFLOXACIN 500 MG/1
500 TABLET, FILM COATED ORAL DAILY
COMMUNITY
Start: 2025-03-14 | End: 2025-03-24

## 2025-03-20 RX ORDER — LIDOCAINE 50 MG/G
1 PATCH TOPICAL DAILY
COMMUNITY
Start: 2025-03-15 | End: 2025-04-14

## 2025-03-23 RX ORDER — ERGOCALCIFEROL 1.25 MG/1
50000 CAPSULE ORAL
Qty: 12 CAPSULE | Refills: 1 | Status: SHIPPED | OUTPATIENT
Start: 2025-03-23

## 2025-03-24 PROBLEM — Z78.9 IMPAIRED MOBILITY AND ACTIVITIES OF DAILY LIVING: Status: ACTIVE | Noted: 2025-03-24

## 2025-03-24 PROBLEM — Z74.09 IMPAIRED MOBILITY AND ACTIVITIES OF DAILY LIVING: Status: ACTIVE | Noted: 2025-03-24

## 2025-03-24 NOTE — PROGRESS NOTES
Nikki Bridges NP   [facility]  [address]  [place]  [address]      PATIENT NAME: Delmis Shell  : 1950  DATE: 3/20/25  MRN: [id number]      Billing Provider: Nikki Bridges NP  Level of Service: DE OFFICE/OUTPT VISIT, EST, LEVL III, 20-29 MIN  Patient PCP Information       Provider PCP Type    Nikki Bridges NP General            Reason for Visit / Chief Complaint: Hospital Follow Up (Hospital follow up for wound to left lower leg. Patient reports she no longer has home health, she will be going to [facility] wound clinic for treatment of leg. Patient does have drain intact. ), Gait Problem (Due to wound to lower leg patient is requesting wheelchair and walking stick with 4 pedal support to help with gait unsteadiness. Patient has very unsteady gait, has to hold on to osorio for support. ), and Health Maintenance (Shingles Vaccine(2 of 3) due on 12/10/2013/RSV Vaccine (Age 60+ and Pregnant patients)(1 - 1-dose 75+ series) Never done/COVID-19 Vaccine(2024- season) due on 2024/DEXA Scan due on 2025//Declined all care gaps at this time. )         History of Present Illness / Problem Focused Workflow     75 year old female presents to clinic for Hospital Follow up. She has wound to left lower leg with wound vac intact. States she no longer has home health. She is [facility] twice weekly.   Patient has impaired ambulation due to wound to left lower leg. She is requesting a wheelchair to assist with mobility and ADLs. Patient has very unsteady gait, has to hold on to walls for support.               Review of Systems     @Review of Systems   Constitutional:  Negative for activity change, appetite change, fatigue and fever.   HENT:  Negative for nasal congestion, ear pain, rhinorrhea, sinus pressure/congestion and sore throat.    Eyes:  Negative for pain, redness, visual disturbance and eye dryness.   Respiratory:  Negative for cough and  shortness of breath.    Cardiovascular:  Negative for chest pain and leg swelling.   Gastrointestinal:  Negative for abdominal distention, abdominal pain, constipation and diarrhea.   Endocrine: Negative for cold intolerance, heat intolerance and polyuria.   Genitourinary:  Negative for bladder incontinence, dysuria, frequency and urgency.   Musculoskeletal:  Positive for gait problem. Negative for arthralgias and myalgias.   Integumentary:  Positive for wound. Negative for color change and rash.   Allergic/Immunologic: Negative for environmental allergies and food allergies.   Neurological:  Negative for dizziness, weakness, light-headedness and headaches.   Psychiatric/Behavioral:  Negative for behavioral problems and sleep disturbance.        Medical / Social / Family History     Past Medical History:   Diagnosis Date    Arthritis     Cataract     Family history of breast cancer in sister 11/14/2022    Hyperlipidemia     Hypertension     Left hip pain     Osteoporosis     Overactive bladder     Venous insufficiency, peripheral     Vitamin D deficiency        Past Surgical History:   Procedure Laterality Date    CATARACT EXTRACTION W/ INTRAOCULAR LENS  IMPLANT, BILATERAL Bilateral     EXCISION OF BREAST LESION Right 1970    KNEE ARTHROSCOPY Right 2020    low back disc surgery  2014    in [place]    TUBAL LIGATION      VAGINAL DELIVERY  1980    VAGINAL DELIVERY  1975    VENOUS ABLATION Right 09/18/2020    Anterior Shin Varithena Ablation performed by DR. Cesar Dumont.    VENOUS ABLATION Left 09/11/2020    Lower Anterior Shin Varithen Ablation performed by Dr. Cesar Dumont.       Medications and Allergies     Medications  Outpatient Medications Marked as Taking for the 3/20/25 encounter (Office Visit) with Nikki Bridges NP   Medication Sig Dispense Refill    aspirin (ECOTRIN) 81 MG EC tablet Take 81 mg by mouth once daily.      calcium carb and citrate-vitD3 600 mg calcium- 500 unit TbSR Take 1 tablet by mouth  twice a week.      cyclobenzaprine (FLEXERIL) 5 MG tablet Take 5 mg by mouth 3 (three) times daily as needed (as needed.).      denosumab (PROLIA) 60 mg/mL Syrg Inject 60 mg into the skin every 6 (six) months.      docusate sodium (COLACE) 100 MG capsule Take 1 capsule by mouth once daily.      fluticasone propionate (FLOVENT DISKUS) 50 mcg/actuation DsDv Inhale 1 spray into the lungs 2 (two) times a day. Controller 3 each 1    gabapentin (NEURONTIN) 300 MG capsule Take 1 capsule (300 mg total) by mouth 3 (three) times daily as needed (Neuropathy). 90 capsule 1    garlic 1,000 mg Cap Take 1 capsule by mouth once daily.      HYDROcodone-acetaminophen (NORCO)  mg per tablet Take 1 tablet by mouth every 6 (six) hours as needed for Pain. 16 tablet 0    hydrOXYzine pamoate (VISTARIL) 25 MG Cap Take 1 capsule (25 mg total) by mouth every 6 (six) hours as needed (anxiety). 60 capsule 1    ibuprofen (ADVIL,MOTRIN) 800 MG tablet Take 1 tablet (800 mg total) by mouth every 12 (twelve) hours as needed for Pain. 30 tablet 2    levoFLOXacin (LEVAQUIN) 500 MG tablet Take 500 mg by mouth once daily.      LIDOcaine (LIDODERM) 5 % Place 1 patch onto the skin once daily.      loratadine (CLARITIN) 10 mg tablet Take 1 tablet (10 mg total) by mouth once daily. 90 tablet 1    meclizine (ANTIVERT) 12.5 mg tablet Take 1 tablet (12.5 mg total) by mouth 3 (three) times daily as needed for Dizziness. 60 tablet 1    meloxicam (MOBIC) 15 MG tablet Take 1 tablet (15 mg total) by mouth once daily. 90 tablet 1    olmesartan-hydrochlorothiazide (BENICAR HCT) 20-12.5 mg per tablet Take 1 tablet by mouth once daily. 90 tablet 1    olopatadine (PATANOL) 0.1 % ophthalmic solution Place 1 drop into both eyes 2 (two) times daily.      oxybutynin (DITROPAN) 5 MG Tab Take 1 tablet (5 mg total) by mouth 3 (three) times daily. 90 tablet 3    potassium chloride (KLOR-CON) 10 MEQ TbSR Take 10 mEq by mouth once daily.      rosuvastatin (CRESTOR) 10 MG  tablet Take 1 tablet (10 mg total) by mouth every other day. 45 tablet 1       Allergies  Review of patient's allergies indicates:  No Known Allergies    Physical Examination     Vitals:    03/20/25 0904   BP: 110/60   Pulse: 88   Resp: 19   Temp: 97.3 °F (36.3 °C)     Physical Exam  Vitals and nursing note reviewed.   HENT:      Head: Normocephalic.      Nose: Nose normal.      Mouth/Throat:      Mouth: Mucous membranes are moist.      Pharynx: Oropharynx is clear. No posterior oropharyngeal erythema.   Eyes:      Conjunctiva/sclera: Conjunctivae normal.   Cardiovascular:      Rate and Rhythm: Normal rate and regular rhythm.      Pulses: Normal pulses.      Heart sounds: Normal heart sounds.   Pulmonary:      Effort: Pulmonary effort is normal.      Breath sounds: Normal breath sounds.   Abdominal:      General: Abdomen is flat. Bowel sounds are normal. There is no distension.      Palpations: Abdomen is soft.   Musculoskeletal:         General: No swelling or tenderness. Normal range of motion.      Cervical back: Normal range of motion.      Right lower leg: No edema.      Left lower leg: No edema.   Skin:     General: Skin is warm and dry.      Capillary Refill: Capillary refill takes less than 2 seconds.      Findings: Wound present.      Comments: Wound to LLE with wound vac intact.   Neurological:      Mental Status: She is alert. Mental status is at baseline.      Gait: Gait abnormal.      Comments: Left lower leg weakness due to wound.    Psychiatric:         Mood and Affect: Mood normal.         Behavior: Behavior normal.               Lab Results   Component Value Date    WBC 4.36 (L) 11/07/2024    HGB 11.3 (L) 11/07/2024    HCT 37.9 (L) 11/07/2024    MCV 90.5 11/07/2024     11/07/2024        CMP  Sodium   Date Value Ref Range Status   11/07/2024 140 136 - 145 mmol/L Final     Potassium   Date Value Ref Range Status   11/07/2024 4.2 3.5 - 5.1 mmol/L Final     Chloride   Date Value Ref Range Status    11/07/2024 108 (H) 98 - 107 mmol/L Final     CO2   Date Value Ref Range Status   11/07/2024 26 21 - 32 mmol/L Final     Glucose   Date Value Ref Range Status   11/07/2024 86 74 - 106 mg/dL Final     BUN   Date Value Ref Range Status   11/07/2024 22 (H) 7 - 18 mg/dL Final     Creatinine   Date Value Ref Range Status   11/07/2024 1.05 (H) 0.55 - 1.02 mg/dL Final     Calcium   Date Value Ref Range Status   11/07/2024 9.5 8.5 - 10.1 mg/dL Final     Total Protein   Date Value Ref Range Status   11/07/2024 7.1 6.4 - 8.2 g/dL Final     Albumin   Date Value Ref Range Status   11/07/2024 3.1 (L) 3.5 - 5.0 g/dL Final     Bilirubin, Total   Date Value Ref Range Status   11/07/2024 0.4 >0.0 - 1.2 mg/dL Final     Alk Phos   Date Value Ref Range Status   11/07/2024 57 55 - 142 U/L Final     AST   Date Value Ref Range Status   11/07/2024 23 15 - 37 U/L Final     ALT   Date Value Ref Range Status   11/07/2024 23 13 - 56 U/L Final     Anion Gap   Date Value Ref Range Status   11/07/2024 10 7 - 16 mmol/L Final     eGFR   Date Value Ref Range Status   11/07/2024 55 (L) >=60 mL/min/1.73m2 Final     Procedures   Assessment and Plan (including Health Maintenance)   :    Plan:     Problem List Items Addressed This Visit       Vitamin D deficiency    Relevant Medications    ergocalciferol (ERGOCALCIFEROL) 50,000 unit Cap    Wound of left leg - Primary    Current Assessment & Plan   Wound vac intact. Follow up with [facility] as scheduled.          Impaired mobility and activities of daily living    Current Assessment & Plan   Wheelchair ordered. She has family who is providing assistance. She has very unsteady gait.          Relevant Orders    WHEELCHAIR FOR HOME USE     Other Visit Diagnoses         Impaired gait and mobility                Health Maintenance Topics with due status: Not Due       Topic Last Completion Date    Mammogram 12/20/2024    TETANUS VACCINE 02/03/2025    Hemoglobin A1c (Prediabetes) 03/09/2025     Lipid Panel 03/09/2025       Future Appointments   Date Time Provider Department Center   4/3/2025  1:30 PM R[facility] XR4 DEXA [facility] XRAY [facility]   4/8/2025  9:00 AM INFUSION, [place] [facility] INFUSN [place]    4/21/2025 10:20 AM Nikki Bridges NP [facility] FAMMED [place]   5/8/2025 10:00 AM AWV NURSE [facility] FAMMED [place]        Health Maintenance Due   Topic Date Due    Shingles Vaccine (2 of 3) 12/10/2013    RSV Vaccine (Age 60+ and Pregnant patients) (1 - 1-dose 75+ series) Never done    COVID-19 Vaccine (6 - 2024-25 season) 09/01/2024    DEXA Scan  02/02/2025          Signature:  Nikki Bridges NP  [facility]  [address] [place]  [address]    Date of encounter: 3/20/25

## 2025-03-25 ENCOUNTER — TELEPHONE (OUTPATIENT)
Dept: FAMILY MEDICINE | Facility: CLINIC | Age: 75
End: 2025-03-25
Payer: COMMERCIAL

## 2025-03-25 NOTE — TELEPHONE ENCOUNTER
----- Message from Create sent at 3/25/2025  9:06 AM CDT -----  Who Called: Jaci Tong is requesting assistance/information from provider's office.Jaci is wanting to speak w nurse about pt. No details. Preferred Method of Contact: Phone CallPatient's Preferred Phone Number on File: [phone] Best Call Back Number, if different:[phone] jaci Additional Information:

## 2025-03-25 NOTE — TELEPHONE ENCOUNTER
Pt's daughter called and reported she was concerned her mother was depressed due to recent illness. Pt's daughter works as a  and has spoken with her mom on the phone. She reports patient having crying spells. Pt has stated to her daughter she feels like she has let family down due to not be able to care for herself as she was prior to injury. She would like pt to be seen in the clinic.Pt was in clinic 03/20/25 & did not mention any depression. Will reach out to patient to schedule an appt.

## 2025-03-25 NOTE — TELEPHONE ENCOUNTER
I spoke with patient. She declined appointment in the clinic and referral to counseling. Patient will call back to clinic if she changes her mind. She has good family support. Her  is home with her and is a good support for patient.

## 2025-04-01 ENCOUNTER — EXTERNAL HOME HEALTH (OUTPATIENT)
Dept: HOME HEALTH SERVICES | Facility: HOSPITAL | Age: 75
End: 2025-04-01
Payer: COMMERCIAL

## 2025-04-15 ENCOUNTER — DOCUMENT SCAN (OUTPATIENT)
Dept: HOME HEALTH SERVICES | Facility: HOSPITAL | Age: 75
End: 2025-04-15
Payer: COMMERCIAL

## 2025-04-21 ENCOUNTER — TELEPHONE (OUTPATIENT)
Dept: ORTHOPEDICS | Facility: CLINIC | Age: 75
End: 2025-04-21
Payer: COMMERCIAL

## 2025-04-21 NOTE — TELEPHONE ENCOUNTER
----- Message from Clair sent at 4/21/2025 10:17 AM CDT -----  Who Called: Jaci Tong is requesting a sooner appointment. Caller declined first available appointment listed below. Caller will not accept being placed on the waitlist and is requesting a message be sent to doctor.When is the first available appointment?04/30Options offered (Virtual Visit, Urgent Care): Symptoms:Right shoulder Preferred Method of Contact: Phone CallPatient's Preferred Phone Number on File: [phone] Call Back Number, if different:Additional Information: Jaci is wanting to see if pt can come in on 04/28 around 10:40 due to her having an appt. States she is a , and pt missed her appt 03/12

## 2025-04-24 ENCOUNTER — TELEPHONE (OUTPATIENT)
Dept: FAMILY MEDICINE | Facility: CLINIC | Age: 75
End: 2025-04-24
Payer: COMMERCIAL

## 2025-04-28 DIAGNOSIS — M17.11 OSTEOARTHRITIS OF RIGHT KNEE, UNSPECIFIED OSTEOARTHRITIS TYPE: ICD-10-CM

## 2025-04-28 DIAGNOSIS — I10 HYPERTENSION, UNSPECIFIED TYPE: ICD-10-CM

## 2025-04-28 DIAGNOSIS — M79.2 NEUROPATHIC PAIN: ICD-10-CM

## 2025-04-28 RX ORDER — HYDROXYZINE PAMOATE 25 MG/1
25 CAPSULE ORAL EVERY 6 HOURS PRN
Qty: 60 CAPSULE | Refills: 0 | Status: SHIPPED | OUTPATIENT
Start: 2025-04-28

## 2025-04-28 RX ORDER — ROSUVASTATIN CALCIUM 10 MG/1
10 TABLET, COATED ORAL EVERY OTHER DAY
Qty: 15 TABLET | Refills: 0 | Status: SHIPPED | OUTPATIENT
Start: 2025-04-28 | End: 2025-04-29 | Stop reason: SDUPTHER

## 2025-04-28 RX ORDER — MELOXICAM 15 MG/1
15 TABLET ORAL DAILY
Qty: 30 TABLET | Refills: 0 | Status: SHIPPED | OUTPATIENT
Start: 2025-04-28 | End: 2025-04-29

## 2025-04-28 RX ORDER — GABAPENTIN 300 MG/1
300 CAPSULE ORAL 3 TIMES DAILY PRN
Qty: 90 CAPSULE | Refills: 0 | Status: SHIPPED | OUTPATIENT
Start: 2025-04-28 | End: 2025-04-29 | Stop reason: SDUPTHER

## 2025-04-28 RX ORDER — OXYBUTYNIN CHLORIDE 5 MG/1
5 TABLET ORAL 3 TIMES DAILY
Qty: 90 TABLET | Refills: 0 | Status: SHIPPED | OUTPATIENT
Start: 2025-04-28 | End: 2025-04-29 | Stop reason: SDUPTHER

## 2025-04-28 RX ORDER — OLMESARTAN MEDOXOMIL AND HYDROCHLOROTHIAZIDE 20/12.5 20; 12.5 MG/1; MG/1
1 TABLET ORAL DAILY
Qty: 30 TABLET | Refills: 0 | Status: SHIPPED | OUTPATIENT
Start: 2025-04-28 | End: 2025-04-29 | Stop reason: SDUPTHER

## 2025-04-28 NOTE — TELEPHONE ENCOUNTER
"When asked which medication she needs refilled the Pt stated "I am out of all of them". Pt notified she was a No Show for a recent visit, so she scheduled to see KELLY Bridges tomorrow, 4/29/2025, but still requested her prescriptions be sent in today to [facility] in [place].   "

## 2025-04-29 ENCOUNTER — OFFICE VISIT (OUTPATIENT)
Dept: FAMILY MEDICINE | Facility: CLINIC | Age: 75
End: 2025-04-29
Payer: COMMERCIAL

## 2025-04-29 VITALS
RESPIRATION RATE: 18 BRPM | HEIGHT: 65 IN | TEMPERATURE: 98 F | WEIGHT: 168.81 LBS | DIASTOLIC BLOOD PRESSURE: 82 MMHG | HEART RATE: 82 BPM | BODY MASS INDEX: 28.12 KG/M2 | SYSTOLIC BLOOD PRESSURE: 142 MMHG | OXYGEN SATURATION: 96 %

## 2025-04-29 DIAGNOSIS — M79.2 NEUROPATHIC PAIN: ICD-10-CM

## 2025-04-29 DIAGNOSIS — I10 HYPERTENSION, UNSPECIFIED TYPE: Primary | ICD-10-CM

## 2025-04-29 DIAGNOSIS — G47.62 SLEEP RELATED LEG CRAMPS: ICD-10-CM

## 2025-04-29 DIAGNOSIS — S81.802S WOUND OF LEFT LOWER EXTREMITY, SEQUELA: ICD-10-CM

## 2025-04-29 DIAGNOSIS — J30.9 ALLERGIC RHINITIS, UNSPECIFIED SEASONALITY, UNSPECIFIED TRIGGER: ICD-10-CM

## 2025-04-29 DIAGNOSIS — N32.81 OVERACTIVE BLADDER: ICD-10-CM

## 2025-04-29 DIAGNOSIS — E55.9 VITAMIN D DEFICIENCY: ICD-10-CM

## 2025-04-29 PROCEDURE — 3079F DIAST BP 80-89 MM HG: CPT | Mod: ,,, | Performed by: NURSE PRACTITIONER

## 2025-04-29 PROCEDURE — 1160F RVW MEDS BY RX/DR IN RCRD: CPT | Mod: ,,, | Performed by: NURSE PRACTITIONER

## 2025-04-29 PROCEDURE — 3288F FALL RISK ASSESSMENT DOCD: CPT | Mod: ,,, | Performed by: NURSE PRACTITIONER

## 2025-04-29 PROCEDURE — 3077F SYST BP >= 140 MM HG: CPT | Mod: ,,, | Performed by: NURSE PRACTITIONER

## 2025-04-29 PROCEDURE — 1100F PTFALLS ASSESS-DOCD GE2>/YR: CPT | Mod: ,,, | Performed by: NURSE PRACTITIONER

## 2025-04-29 PROCEDURE — 1159F MED LIST DOCD IN RCRD: CPT | Mod: ,,, | Performed by: NURSE PRACTITIONER

## 2025-04-29 PROCEDURE — 99214 OFFICE O/P EST MOD 30 MIN: CPT | Mod: ,,, | Performed by: NURSE PRACTITIONER

## 2025-04-29 PROCEDURE — 1126F AMNT PAIN NOTED NONE PRSNT: CPT | Mod: ,,, | Performed by: NURSE PRACTITIONER

## 2025-04-29 RX ORDER — DOCUSATE SODIUM 100 MG/1
100 CAPSULE, LIQUID FILLED ORAL DAILY
COMMUNITY

## 2025-04-29 RX ORDER — OXYCODONE AND ACETAMINOPHEN 7.5; 325 MG/1; MG/1
1 TABLET ORAL EVERY 6 HOURS PRN
COMMUNITY
Start: 2025-03-28

## 2025-04-29 RX ORDER — VITAMIN E (DL,TOCOPHERYL ACET) 45 MG/0.25
1 DROPS ORAL
COMMUNITY
End: 2025-04-29 | Stop reason: SDUPTHER

## 2025-04-29 RX ORDER — DENOSUMAB 60 MG/ML
60 INJECTION SUBCUTANEOUS
COMMUNITY

## 2025-04-30 ENCOUNTER — TELEPHONE (OUTPATIENT)
Dept: FAMILY MEDICINE | Facility: CLINIC | Age: 75
End: 2025-04-30
Payer: COMMERCIAL

## 2025-04-30 PROBLEM — R82.81 PYURIA: Status: RESOLVED | Noted: 2023-08-25 | Resolved: 2025-04-30

## 2025-04-30 PROBLEM — R42 DIZZINESS: Status: RESOLVED | Noted: 2024-04-25 | Resolved: 2025-04-30

## 2025-04-30 PROBLEM — R53.83 FATIGUE: Status: RESOLVED | Noted: 2022-12-07 | Resolved: 2025-04-30

## 2025-04-30 PROBLEM — M79.605 LEFT LEG PAIN: Status: RESOLVED | Noted: 2021-04-13 | Resolved: 2025-04-30

## 2025-04-30 PROBLEM — N30.00 ACUTE CYSTITIS WITHOUT HEMATURIA: Status: RESOLVED | Noted: 2024-08-19 | Resolved: 2025-04-30

## 2025-04-30 PROBLEM — H65.92 FLUID LEVEL BEHIND TYMPANIC MEMBRANE OF LEFT EAR: Status: RESOLVED | Noted: 2024-11-06 | Resolved: 2025-04-30

## 2025-04-30 PROBLEM — H61.22 IMPACTED CERUMEN OF LEFT EAR: Status: RESOLVED | Noted: 2022-10-18 | Resolved: 2025-04-30

## 2025-04-30 PROBLEM — M79.671 RIGHT FOOT PAIN: Status: RESOLVED | Noted: 2024-06-28 | Resolved: 2025-04-30

## 2025-04-30 RX ORDER — ERGOCALCIFEROL 1.25 MG/1
50000 CAPSULE ORAL
Qty: 12 CAPSULE | Refills: 1 | Status: SHIPPED | OUTPATIENT
Start: 2025-04-30

## 2025-04-30 RX ORDER — ROSUVASTATIN CALCIUM 10 MG/1
10 TABLET, COATED ORAL EVERY OTHER DAY
Qty: 90 TABLET | Refills: 1 | Status: SHIPPED | OUTPATIENT
Start: 2025-04-30

## 2025-04-30 RX ORDER — GABAPENTIN 300 MG/1
300 CAPSULE ORAL 3 TIMES DAILY PRN
Qty: 270 CAPSULE | Refills: 1 | Status: SHIPPED | OUTPATIENT
Start: 2025-04-30

## 2025-04-30 RX ORDER — VITAMIN E (DL,TOCOPHERYL ACET) 45 MG/0.25
1 DROPS ORAL
Qty: 90 TABLET | Refills: 1 | Status: SHIPPED | OUTPATIENT
Start: 2025-05-01

## 2025-04-30 RX ORDER — CYCLOBENZAPRINE HCL 5 MG
5 TABLET ORAL 3 TIMES DAILY PRN
Qty: 90 TABLET | Refills: 1 | Status: SHIPPED | OUTPATIENT
Start: 2025-04-30

## 2025-04-30 RX ORDER — OLMESARTAN MEDOXOMIL AND HYDROCHLOROTHIAZIDE 20/12.5 20; 12.5 MG/1; MG/1
1 TABLET ORAL DAILY
Qty: 90 TABLET | Refills: 1 | Status: SHIPPED | OUTPATIENT
Start: 2025-04-30

## 2025-04-30 RX ORDER — LORATADINE 10 MG/1
10 TABLET ORAL DAILY
Qty: 90 TABLET | Refills: 1 | Status: SHIPPED | OUTPATIENT
Start: 2025-04-30

## 2025-04-30 RX ORDER — OXYBUTYNIN CHLORIDE 5 MG/1
5 TABLET ORAL 3 TIMES DAILY
Qty: 270 TABLET | Refills: 1 | Status: SHIPPED | OUTPATIENT
Start: 2025-04-30
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
## 2025-04-30 NOTE — TELEPHONE ENCOUNTER
Mireya came in and said she forgot to tell us yesterday she needed a refill on her cholesterol medication. [facility] in [place]. The medication is still pending for refill.

## 2025-05-06 NOTE — PATIENT INSTRUCTIONS
Counseling and Referral of Other Preventative  (Italic type indicates deductible and co-insurance are waived)    Patient Name: Delmis Shell  Today's Date: 5/8/2025    Health Maintenance       Date Due Completion Date    Sign Pain Contract Never done ---    Shingles Vaccine (2 of 3) 12/10/2013 10/15/2013    RSV Vaccine (Age 60+ and Pregnant patients) (1 - 1-dose 75+ series) Never done ---    COVID-19 Vaccine (6 - 2024-25 season) 09/01/2024 1/31/2023    DEXA Scan 02/02/2025 2/2/2023    Override on 12/2/2019: Done ([place])    Mammogram 12/20/2025 12/20/2024    Override on 10/31/2020: Done ([facility])    Hemoglobin A1c (Prediabetes) 03/09/2026 3/9/2025    Lipid Panel 03/09/2026 3/9/2025    TETANUS VACCINE 02/03/2035 2/3/2025        No orders of the defined types were placed in this encounter.    Counseling and Referral of Other Preventative  (Italic type indicates deductible and co-insurance are waived)    Patient Name: Delmis Shell  Today's Date: 5/8/2025    Health Maintenance       Date Due Completion Date    Sign Pain Contract Never done ---    Shingles Vaccine (2 of 3) 12/10/2013 10/15/2013    RSV Vaccine (Age 60+ and Pregnant patients) (1 - 1-dose 75+ series) Never done ---    COVID-19 Vaccine (6 - 2024-25 season) 09/01/2024 1/31/2023    DEXA Scan 02/02/2025 2/2/2023    Override on 12/2/2019: Done ([place])    Mammogram 12/20/2025 12/20/2024    Override on 10/31/2020: Done ([facility])    Hemoglobin A1c (Prediabetes) 03/09/2026 3/9/2025    Lipid Panel 03/09/2026 3/9/2025    TETANUS VACCINE 02/03/2035 2/3/2025        No orders of the defined types were placed in this encounter.    The following information is provided to all patients.  This information is to help you find resources for any of the problems found today that may be affecting your health:                  Living healthy guide: [place].gov    Understanding Diabetes: www.diabetes.org      Eating healthy: www.cdc.gov/healthyweight      CDC home safety  checklist: www.cdc.gov/steadi/patient.html      Agency on Aging: [place].gov    Alcoholics anonymous (AA): www.aa.org      Physical Activity: www.jl.nih.gov/ji7viij      Tobacco use: ms.gov
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
## 2025-05-07 ENCOUNTER — HOSPITAL ENCOUNTER (OUTPATIENT)
Dept: RADIOLOGY | Facility: HOSPITAL | Age: 75
Discharge: HOME | End: 2025-05-07
Attending: ORTHOPAEDIC SURGERY
Payer: COMMERCIAL

## 2025-05-07 ENCOUNTER — OFFICE VISIT (OUTPATIENT)
Dept: ORTHOPEDICS | Facility: CLINIC | Age: 75
End: 2025-05-07
Payer: COMMERCIAL

## 2025-05-07 VITALS
HEART RATE: 65 BPM | OXYGEN SATURATION: 97 % | DIASTOLIC BLOOD PRESSURE: 68 MMHG | WEIGHT: 168.88 LBS | SYSTOLIC BLOOD PRESSURE: 154 MMHG | BODY MASS INDEX: 28.14 KG/M2 | HEIGHT: 65 IN

## 2025-05-07 DIAGNOSIS — M25.511 RIGHT SHOULDER PAIN, UNSPECIFIED CHRONICITY: ICD-10-CM

## 2025-05-07 DIAGNOSIS — M25.511 RIGHT SHOULDER PAIN, UNSPECIFIED CHRONICITY: Primary | ICD-10-CM

## 2025-05-07 PROCEDURE — 73030 X-RAY EXAM OF SHOULDER: CPT | Mod: TC,RT

## 2025-05-07 PROCEDURE — 99999 PR PBB SHADOW E&M-EST. PATIENT-LVL IV: CPT | Mod: PBBFAC,,, | Performed by: ORTHOPAEDIC SURGERY

## 2025-05-07 PROCEDURE — 99214 OFFICE O/P EST MOD 30 MIN: CPT | Mod: PBBFAC,25 | Performed by: ORTHOPAEDIC SURGERY

## 2025-05-07 PROCEDURE — 20610 DRAIN/INJ JOINT/BURSA W/O US: CPT | Mod: PBBFAC,RT | Performed by: ORTHOPAEDIC SURGERY

## 2025-05-07 PROCEDURE — 99999PBSHW PR PBB SHADOW TECHNICAL ONLY FILED TO HB: Mod: PBBFAC,,,

## 2025-05-07 RX ORDER — BUPIVACAINE HYDROCHLORIDE 2.5 MG/ML
1 INJECTION, SOLUTION EPIDURAL; INFILTRATION; INTRACAUDAL; PERINEURAL
Status: DISCONTINUED | OUTPATIENT
Start: 2025-05-07 | End: 2025-05-07 | Stop reason: HOSPADM

## 2025-05-07 RX ORDER — TRIAMCINOLONE ACETONIDE 40 MG/ML
40 INJECTION, SUSPENSION INTRA-ARTICULAR; INTRAMUSCULAR
Status: DISCONTINUED | OUTPATIENT
Start: 2025-05-07 | End: 2025-05-07 | Stop reason: HOSPADM

## 2025-05-07 RX ADMIN — TRIAMCINOLONE ACETONIDE 40 MG: 40 INJECTION, SUSPENSION INTRA-ARTICULAR; INTRAMUSCULAR at 10:05

## 2025-05-07 RX ADMIN — BUPIVACAINE HYDROCHLORIDE 1 ML: 2.5 INJECTION, SOLUTION EPIDURAL; INFILTRATION; INTRACAUDAL; PERINEURAL at 10:05

## 2025-05-07 NOTE — PROGRESS NOTES
Radiology Interpretation        Patient Name: Delmis Shell  Date: 5/7/2025  YOB: 1950  MRN# [id number]        ORDERING DIAGNOSIS:    Encounter Diagnosis   Name Primary?    Right shoulder pain, unspecified chronicity Yes        Two views right shoulder skeletally mature individual there is normal mineralization no fractures subluxations there are degenerative changes of the AC joint impression degenerative changes AC joint right shoulder               Pablo Torres MD                      "I have abnormal PAP smear"

## 2025-05-07 NOTE — PROGRESS NOTES
Patient is here for follow-up of her right shoulder x-rays today show that she has degenerative changes AC joint some pain on crossed adduction testing and impingement testing negative sulcus test discussed treatment options she will do strengthening exercises of the shoulder.  I injected her shoulder with 1 cc of Marcaine 1 cc Kenalog.  Let her use her arm as tolerates.  Patient has some Achilles tendinitis in his well.  She is going to modify her shoe wear doing some stretching exercises.

## 2025-05-07 NOTE — PROCEDURES
Large Joint Aspiration/Injection: R subacromial bursa    Date/Time: 5/7/2025 10:30 AM    Performed by: Pablo Torres MD  Authorized by: Pablo Torres MD    Consent Done?:  Yes (Verbal)  Indications:  Pain    Details:  Needle Size:  22 G  Ultrasonic Guidance for needle placement?: No    Approach:  Posterior  Location:  Shoulder  Site:  R subacromial bursa  Medications:  1 mL BUPivacaine (PF) 0.25% (2.5 mg/ml) 0.25 % (2.5 mg/mL); 40 mg triamcinolone acetonide 40 mg/mL  Patient tolerance:  Patient tolerated the procedure well with no immediate complications

## 2025-05-08 ENCOUNTER — OFFICE VISIT (OUTPATIENT)
Dept: FAMILY MEDICINE | Facility: CLINIC | Age: 75
End: 2025-05-08
Payer: COMMERCIAL

## 2025-05-08 VITALS
OXYGEN SATURATION: 96 % | HEART RATE: 70 BPM | DIASTOLIC BLOOD PRESSURE: 80 MMHG | RESPIRATION RATE: 20 BRPM | WEIGHT: 167.06 LBS | BODY MASS INDEX: 27.83 KG/M2 | TEMPERATURE: 98 F | HEIGHT: 65 IN | SYSTOLIC BLOOD PRESSURE: 138 MMHG

## 2025-05-08 DIAGNOSIS — R26.89 IMPAIRED GAIT AND MOBILITY: ICD-10-CM

## 2025-05-08 DIAGNOSIS — E55.9 VITAMIN D DEFICIENCY: ICD-10-CM

## 2025-05-08 DIAGNOSIS — I10 HYPERTENSION, UNSPECIFIED TYPE: ICD-10-CM

## 2025-05-08 DIAGNOSIS — M54.16 LUMBAR RADICULOPATHY: ICD-10-CM

## 2025-05-08 DIAGNOSIS — I87.2 CHRONIC VENOUS INSUFFICIENCY OF LOWER EXTREMITY: ICD-10-CM

## 2025-05-08 DIAGNOSIS — M79.2 NEUROPATHIC PAIN: ICD-10-CM

## 2025-05-08 DIAGNOSIS — N18.31 STAGE 3A CHRONIC KIDNEY DISEASE: ICD-10-CM

## 2025-05-08 DIAGNOSIS — Z00.00 ENCOUNTER FOR SUBSEQUENT ANNUAL WELLNESS VISIT (AWV) IN MEDICARE PATIENT: Primary | ICD-10-CM

## 2025-05-08 NOTE — ASSESSMENT & PLAN NOTE
NO issues at present. She reports she occasionally has some edema but it is improved when she elevates feet.
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
## 2025-05-09 NOTE — ASSESSMENT & PLAN NOTE
Body mass index is 27.80. Obesity complicates all aspects of disease management from diagnostic modalities to treatment. Weight loss encouraged and health benefits explained to patient.

## 2025-05-09 NOTE — ASSESSMENT & PLAN NOTE
Last eGFR 55 in November of 2024. Encouraged to increase water intake and avoid nephrotoxic meds. Will continue to monitor these numbers.    [FreeTextEntry1] : Pt is here for follow up of multiple medical problems including  hyperlipidemia  was    not completey fasting  and  did gain some weight [de-identified] : Pt has been going  for PT  for  cervicular radicluopathy

## 2025-05-13 ENCOUNTER — INFUSION (OUTPATIENT)
Dept: INFUSION THERAPY | Facility: HOSPITAL | Age: 75
End: 2025-05-13
Attending: NURSE PRACTITIONER
Payer: COMMERCIAL

## 2025-05-13 ENCOUNTER — APPOINTMENT (OUTPATIENT)
Dept: LAB | Facility: HOSPITAL | Age: 75
End: 2025-05-13
Attending: NURSE PRACTITIONER
Payer: COMMERCIAL

## 2025-05-13 VITALS
HEART RATE: 61 BPM | DIASTOLIC BLOOD PRESSURE: 70 MMHG | HEIGHT: 65 IN | BODY MASS INDEX: 28.02 KG/M2 | WEIGHT: 168.19 LBS | RESPIRATION RATE: 18 BRPM | TEMPERATURE: 98 F | SYSTOLIC BLOOD PRESSURE: 169 MMHG | OXYGEN SATURATION: 96 %

## 2025-05-13 DIAGNOSIS — M81.0 AGE-RELATED OSTEOPOROSIS WITHOUT CURRENT PATHOLOGICAL FRACTURE: Primary | ICD-10-CM

## 2025-05-13 LAB — CALCIUM SERPL-MCNC: 9.8 MG/DL (ref 8.4–10.2)

## 2025-05-13 PROCEDURE — 36415 COLL VENOUS BLD VENIPUNCTURE: CPT | Performed by: NURSE PRACTITIONER

## 2025-05-13 PROCEDURE — 82310 ASSAY OF CALCIUM: CPT | Performed by: NURSE PRACTITIONER

## 2025-05-13 PROCEDURE — 96372 THER/PROPH/DIAG INJ SC/IM: CPT

## 2025-05-13 PROCEDURE — 63600175 PHARM REV CODE 636 W HCPCS: Mod: JZ,TB | Performed by: NURSE PRACTITIONER

## 2025-05-13 RX ADMIN — DENOSUMAB 60 MG: 60 INJECTION SUBCUTANEOUS at 11:05

## 2025-05-13 NOTE — PROGRESS NOTES
1050 Pt in room 1.  Calcium level checked today was 9.8.  Pt stated that she is taking her Calcium with Vitamin D.     1135 Administered Prolia 60mg SQ to left upper arm per protocol.  Pt tolerated well.    1155 No adverse reaction noted.  D/C home, ambulatory with appointment in hand to return in 6 months for next Prolia injection.

## 2025-05-23 ENCOUNTER — TELEPHONE (OUTPATIENT)
Dept: FAMILY MEDICINE | Facility: CLINIC | Age: 75
End: 2025-05-23
Payer: COMMERCIAL

## 2025-05-23 ENCOUNTER — OFFICE VISIT (OUTPATIENT)
Dept: FAMILY MEDICINE | Facility: CLINIC | Age: 75
End: 2025-05-23
Payer: COMMERCIAL

## 2025-05-23 VITALS
HEIGHT: 65 IN | HEART RATE: 84 BPM | BODY MASS INDEX: 28.02 KG/M2 | TEMPERATURE: 98 F | DIASTOLIC BLOOD PRESSURE: 67 MMHG | RESPIRATION RATE: 17 BRPM | WEIGHT: 168.19 LBS | OXYGEN SATURATION: 96 % | SYSTOLIC BLOOD PRESSURE: 97 MMHG

## 2025-05-23 DIAGNOSIS — H61.23 IMPACTED CERUMEN OF BOTH EARS: ICD-10-CM

## 2025-05-23 DIAGNOSIS — K64.9 HEMORRHOIDS, UNSPECIFIED HEMORRHOID TYPE: Primary | ICD-10-CM

## 2025-05-23 PROCEDURE — 1159F MED LIST DOCD IN RCRD: CPT | Mod: ,,, | Performed by: NURSE PRACTITIONER

## 2025-05-23 PROCEDURE — 99213 OFFICE O/P EST LOW 20 MIN: CPT | Mod: 25,,, | Performed by: NURSE PRACTITIONER

## 2025-05-23 PROCEDURE — 3074F SYST BP LT 130 MM HG: CPT | Mod: ,,, | Performed by: NURSE PRACTITIONER

## 2025-05-23 PROCEDURE — 1100F PTFALLS ASSESS-DOCD GE2>/YR: CPT | Mod: ,,, | Performed by: NURSE PRACTITIONER

## 2025-05-23 PROCEDURE — 1160F RVW MEDS BY RX/DR IN RCRD: CPT | Mod: ,,, | Performed by: NURSE PRACTITIONER

## 2025-05-23 PROCEDURE — 3288F FALL RISK ASSESSMENT DOCD: CPT | Mod: ,,, | Performed by: NURSE PRACTITIONER

## 2025-05-23 PROCEDURE — 69210 REMOVE IMPACTED EAR WAX UNI: CPT | Mod: ,,, | Performed by: NURSE PRACTITIONER

## 2025-05-23 PROCEDURE — 1125F AMNT PAIN NOTED PAIN PRSNT: CPT | Mod: ,,, | Performed by: NURSE PRACTITIONER

## 2025-05-23 PROCEDURE — 3078F DIAST BP <80 MM HG: CPT | Mod: ,,, | Performed by: NURSE PRACTITIONER

## 2025-05-23 RX ORDER — HYDROCORTISONE ACETATE 25 MG/1
25 SUPPOSITORY RECTAL 2 TIMES DAILY
Qty: 20 SUPPOSITORY | Refills: 0 | Status: SHIPPED | OUTPATIENT
Start: 2025-05-23 | End: 2025-06-02

## 2025-05-23 NOTE — TELEPHONE ENCOUNTER
Copied from CRM #[id number]. Topic: Appointments - Same Day Access  >> May 23, 2025 12:36 PM Annie wrote:  Who Called: Delmis Shell    Caller is requesting a same day appointment. Caller declined first available appointment listed below.      When is the first available appointment?  Options offered (Virtual Visit, Urgent Care):   Symptoms or reason for appointment: Hemorids        Preferred Method of Contact: Phone Call  Patient's Preferred Phone Number on File: [phone]   Best Call Back Number, if different:  Additional Information:

## 2025-06-20 ENCOUNTER — OFFICE VISIT (OUTPATIENT)
Dept: FAMILY MEDICINE | Facility: CLINIC | Age: 75
End: 2025-06-20
Payer: COMMERCIAL

## 2025-06-20 VITALS
TEMPERATURE: 98 F | OXYGEN SATURATION: 95 % | WEIGHT: 170.38 LBS | DIASTOLIC BLOOD PRESSURE: 66 MMHG | BODY MASS INDEX: 28.39 KG/M2 | HEIGHT: 65 IN | RESPIRATION RATE: 18 BRPM | SYSTOLIC BLOOD PRESSURE: 121 MMHG | HEART RATE: 81 BPM

## 2025-06-20 DIAGNOSIS — I10 HYPERTENSION, UNSPECIFIED TYPE: ICD-10-CM

## 2025-06-20 DIAGNOSIS — R79.9 ABNORMAL FINDING OF BLOOD CHEMISTRY, UNSPECIFIED: ICD-10-CM

## 2025-06-20 DIAGNOSIS — R30.0 DYSURIA: ICD-10-CM

## 2025-06-20 DIAGNOSIS — E78.5 HYPERLIPIDEMIA, UNSPECIFIED HYPERLIPIDEMIA TYPE: ICD-10-CM

## 2025-06-20 DIAGNOSIS — N30.91 CYSTITIS WITH HEMATURIA: Primary | ICD-10-CM

## 2025-06-20 DIAGNOSIS — M25.552 LEFT HIP PAIN: ICD-10-CM

## 2025-06-20 DIAGNOSIS — E55.9 VITAMIN D DEFICIENCY: ICD-10-CM

## 2025-06-20 LAB
25(OH)D3 SERPL-MCNC: 37.2 NG/ML (ref 30–80)
ALBUMIN SERPL BCP-MCNC: 3.1 G/DL (ref 3.4–4.8)
ALBUMIN/GLOB SERPL: 0.8 {RATIO}
ALP SERPL-CCNC: 73 U/L (ref 40–150)
ALT SERPL W P-5'-P-CCNC: 16 U/L
ANION GAP SERPL CALCULATED.3IONS-SCNC: 13 MMOL/L (ref 7–16)
AST SERPL W P-5'-P-CCNC: 25 U/L (ref 11–45)
BASOPHILS # BLD AUTO: 0.02 K/UL (ref 0–0.2)
BASOPHILS NFR BLD AUTO: 0.4 % (ref 0–1)
BILIRUB SERPL-MCNC: 0.3 MG/DL
BILIRUB SERPL-MCNC: ABNORMAL MG/DL
BLOOD URINE, POC: ABNORMAL
BUN SERPL-MCNC: 24 MG/DL (ref 10–20)
BUN/CREAT SERPL: 23 (ref 6–20)
CALCIUM SERPL-MCNC: 9.3 MG/DL (ref 8.4–10.2)
CHLORIDE SERPL-SCNC: 104 MMOL/L (ref 98–107)
CLARITY, UA: ABNORMAL
CO2 SERPL-SCNC: 27 MMOL/L (ref 23–31)
COLOR, UA: ABNORMAL
CREAT SERPL-MCNC: 1.03 MG/DL (ref 0.55–1.02)
DIFFERENTIAL METHOD BLD: ABNORMAL
EGFR (NO RACE VARIABLE) (RUSH/TITUS): 56 ML/MIN/1.73M2
EOSINOPHIL # BLD AUTO: 0.09 K/UL (ref 0–0.5)
EOSINOPHIL NFR BLD AUTO: 1.6 % (ref 1–4)
ERYTHROCYTE [DISTWIDTH] IN BLOOD BY AUTOMATED COUNT: 13.5 % (ref 11.5–14.5)
GLOBULIN SER-MCNC: 4.1 G/DL (ref 2–4)
GLUCOSE SERPL-MCNC: 89 MG/DL (ref 82–115)
GLUCOSE UR QL STRIP: ABNORMAL
HCT VFR BLD AUTO: 36.8 % (ref 38–47)
HGB BLD-MCNC: 10.9 G/DL (ref 12–16)
IMM GRANULOCYTES # BLD AUTO: 0.01 K/UL (ref 0–0.04)
IMM GRANULOCYTES NFR BLD: 0.2 % (ref 0–0.4)
IRON SATN MFR SERPL: 25 % (ref 20–50)
IRON SERPL-MCNC: 80 UG/DL (ref 50–170)
KETONES UR QL STRIP: ABNORMAL
LEUKOCYTE ESTERASE URINE, POC: ABNORMAL
LYMPHOCYTES # BLD AUTO: 2.32 K/UL (ref 1–4.8)
LYMPHOCYTES NFR BLD AUTO: 42.3 % (ref 27–41)
MCH RBC QN AUTO: 26.7 PG (ref 27–31)
MCHC RBC AUTO-ENTMCNC: 29.6 G/DL (ref 32–36)
MCV RBC AUTO: 90 FL (ref 80–96)
MONOCYTES # BLD AUTO: 0.52 K/UL (ref 0–0.8)
MONOCYTES NFR BLD AUTO: 9.5 % (ref 2–6)
MPC BLD CALC-MCNC: 10.1 FL (ref 9.4–12.4)
NEUTROPHILS # BLD AUTO: 2.52 K/UL (ref 1.8–7.7)
NEUTROPHILS NFR BLD AUTO: 46 % (ref 53–65)
NITRITE, POC UA: ABNORMAL
NRBC # BLD AUTO: 0 X10E3/UL
NRBC, AUTO (.00): 0 %
PH, POC UA: 5.5
PLATELET # BLD AUTO: 382 K/UL (ref 150–400)
POTASSIUM SERPL-SCNC: 4.5 MMOL/L (ref 3.5–5.1)
PROT SERPL-MCNC: 7.2 G/DL (ref 5.8–7.6)
PROTEIN, POC: ABNORMAL
RBC # BLD AUTO: 4.09 M/UL (ref 4.2–5.4)
SODIUM SERPL-SCNC: 139 MMOL/L (ref 136–145)
SPECIFIC GRAVITY, POC UA: 1.02
TIBC SERPL-MCNC: 238 UG/DL (ref 70–310)
TIBC SERPL-MCNC: 318 UG/DL (ref 250–450)
TRANSFERRIN SERPL-MCNC: 297 MG/DL (ref 173–360)
UROBILINOGEN, POC UA: 0.2
WBC # BLD AUTO: 5.48 K/UL (ref 4.5–11)

## 2025-06-20 PROCEDURE — 3078F DIAST BP <80 MM HG: CPT | Mod: ,,, | Performed by: NURSE PRACTITIONER

## 2025-06-20 PROCEDURE — 85025 COMPLETE CBC W/AUTO DIFF WBC: CPT | Mod: ,,, | Performed by: CLINICAL MEDICAL LABORATORY

## 2025-06-20 PROCEDURE — 87086 URINE CULTURE/COLONY COUNT: CPT | Mod: ,,, | Performed by: CLINICAL MEDICAL LABORATORY

## 2025-06-20 PROCEDURE — 3074F SYST BP LT 130 MM HG: CPT | Mod: ,,, | Performed by: NURSE PRACTITIONER

## 2025-06-20 PROCEDURE — 3288F FALL RISK ASSESSMENT DOCD: CPT | Mod: ,,, | Performed by: NURSE PRACTITIONER

## 2025-06-20 PROCEDURE — 87186 SC STD MICRODIL/AGAR DIL: CPT | Mod: ,,, | Performed by: CLINICAL MEDICAL LABORATORY

## 2025-06-20 PROCEDURE — 99214 OFFICE O/P EST MOD 30 MIN: CPT | Mod: ,,, | Performed by: NURSE PRACTITIONER

## 2025-06-20 PROCEDURE — 1126F AMNT PAIN NOTED NONE PRSNT: CPT | Mod: ,,, | Performed by: NURSE PRACTITIONER

## 2025-06-20 PROCEDURE — 83550 IRON BINDING TEST: CPT | Mod: ,,, | Performed by: CLINICAL MEDICAL LABORATORY

## 2025-06-20 PROCEDURE — 1101F PT FALLS ASSESS-DOCD LE1/YR: CPT | Mod: ,,, | Performed by: NURSE PRACTITIONER

## 2025-06-20 PROCEDURE — 82306 VITAMIN D 25 HYDROXY: CPT | Mod: ,,, | Performed by: CLINICAL MEDICAL LABORATORY

## 2025-06-20 PROCEDURE — 87077 CULTURE AEROBIC IDENTIFY: CPT | Mod: ,,, | Performed by: CLINICAL MEDICAL LABORATORY

## 2025-06-20 PROCEDURE — 83540 ASSAY OF IRON: CPT | Mod: ,,, | Performed by: CLINICAL MEDICAL LABORATORY

## 2025-06-20 PROCEDURE — 80053 COMPREHEN METABOLIC PANEL: CPT | Mod: ,,, | Performed by: CLINICAL MEDICAL LABORATORY

## 2025-06-20 RX ORDER — ROSUVASTATIN CALCIUM 10 MG/1
10 TABLET, COATED ORAL EVERY OTHER DAY
Qty: 90 TABLET | Refills: 1 | Status: SHIPPED | OUTPATIENT
Start: 2025-06-20

## 2025-06-20 RX ORDER — SULFAMETHOXAZOLE AND TRIMETHOPRIM 800; 160 MG/1; MG/1
1 TABLET ORAL 2 TIMES DAILY
Qty: 14 TABLET | Refills: 0 | Status: SHIPPED | OUTPATIENT
Start: 2025-06-20

## 2025-06-20 RX ORDER — IBUPROFEN 800 MG/1
800 TABLET, FILM COATED ORAL EVERY 6 HOURS PRN
Qty: 30 TABLET | Refills: 0 | Status: SHIPPED | OUTPATIENT
Start: 2025-06-20

## 2025-06-22 ENCOUNTER — RESULTS FOLLOW-UP (OUTPATIENT)
Dept: FAMILY MEDICINE | Facility: CLINIC | Age: 75
End: 2025-06-22

## 2025-06-22 LAB — UA COMPLETE W REFLEX CULTURE PNL UR: ABNORMAL

## 2025-06-27 ENCOUNTER — HOSPITAL ENCOUNTER (OUTPATIENT)
Dept: RADIOLOGY | Facility: HOSPITAL | Age: 75
Discharge: HOME | End: 2025-06-27
Attending: NURSE PRACTITIONER
Payer: COMMERCIAL

## 2025-06-27 DIAGNOSIS — M81.0 AGE-RELATED OSTEOPOROSIS WITHOUT CURRENT PATHOLOGICAL FRACTURE: ICD-10-CM

## 2025-06-27 PROCEDURE — 77080 DXA BONE DENSITY AXIAL: CPT | Mod: 26,,, | Performed by: NUCLEAR MEDICINE

## 2025-06-27 PROCEDURE — 77080 DXA BONE DENSITY AXIAL: CPT | Mod: TC

## 2025-06-30 ENCOUNTER — RESULTS FOLLOW-UP (OUTPATIENT)
Dept: FAMILY MEDICINE | Facility: CLINIC | Age: 75
End: 2025-06-30

## 2025-07-07 NOTE — PROGRESS NOTES
Nikki Bridges NP   [facility]  [address]  [place]  [address]      PATIENT NAME: Delmis Shell  : 1950  DATE: 25  MRN: [id number]      Level of Service: DE OFFICE/OUTPT VISIT, DOROTEO FIGUEROA IV, 30-39 MIN  PCP: Nikki Bridges NP     Reason for Visit / Chief Complaint: Follow-up (Pt presents to clinic for CBC for H&H levels. Pt requesting ibuprofen 800 mg. ) and Dysuria (Pt state burning when urinating x 4-5 days. )     History of Present Illness / Review of Systems   History of Present Illness    CHIEF COMPLAINT:  Patient presents for a follow-up visit with complaints of dysuria for four days and ear pressure.    HPI:  Patient reports dysuria for approximately 4 days and feeling as if she is not emptying her bladder completely. She describes ear pressure and fullness for the past 2 days, affecting her balance. She took medication for dizziness during this time. Patient has been using Flonase for her ear symptoms.  She is requesting refill on her Ibuprofen 800mg for her leg pain.    ROS:  General: -fever, -chills, -fatigue, -weight gain, -weight loss  Eyes: -vision changes, -redness, -discharge  ENT: -ear pain, -nasal congestion, -sore throat, +ear pressure  Cardiovascular: -chest pain, -palpitations, -lower extremity edema  Respiratory: -cough, -shortness of breath  Gastrointestinal: -abdominal pain, -nausea, -vomiting, -diarrhea, -constipation, -blood in stool  Genitourinary: -dysuria, -hematuria, -frequency, +painful urination  Musculoskeletal: -joint pain, -muscle pain, +burning sensation  Skin: -rash, -lesion  Neurological: +headache, +dizziness, -numbness, -tingling  Psychiatric: -anxiety, -depression, -sleep difficulty  Head: +head pain          Medical / Social / Family History     Past Medical History:   Diagnosis Date    Arthritis     Cataract     Family history of breast cancer in sister 2022    Hyperlipidemia     Hypertension     Left hip pain     Osteoporosis      Overactive bladder     Venous insufficiency, peripheral     Vitamin D deficiency        Past Surgical History:   Procedure Laterality Date    CATARACT EXTRACTION W/ INTRAOCULAR LENS  IMPLANT, BILATERAL Bilateral     EXCISION OF BREAST LESION Right 1970    KNEE ARTHROSCOPY Right 2020    low back disc surgery  2014    in [place]    TUBAL LIGATION      VAGINAL DELIVERY  1980    VAGINAL DELIVERY  1975    VENOUS ABLATION Right 09/18/2020    Anterior Shin Varithena Ablation performed by DR. Cesar Dumont.    VENOUS ABLATION Left 09/11/2020    Lower Anterior Shin Varithen Ablation performed by Dr. Cesar Dumont.       Medications and Allergies     Outpatient Medications Marked as Taking for the 6/20/25 encounter (Office Visit) with Nikki Bridges NP   Medication Sig Dispense Refill    aspirin (ECOTRIN) 81 MG EC tablet Take 81 mg by mouth once daily.      calcium carb, citrate-vit D3 600 mg-12.5 mcg (500 unit) TbSR Take 1 tablet by mouth twice a week. 90 tablet 1    cyclobenzaprine (FLEXERIL) 5 MG tablet Take 1 tablet (5 mg total) by mouth 3 (three) times daily as needed (as needed.). 90 tablet 1    denosumab (PROLIA) 60 mg/mL Syrg Inject 60 mg into the skin every 6 (six) months.      docusate sodium (COLACE) 100 MG capsule Take 100 mg by mouth once daily.      ergocalciferol (ERGOCALCIFEROL) 50,000 unit Cap Take 1 capsule (50,000 Units total) by mouth every 7 days. 12 capsule 1    fluticasone propionate (FLOVENT DISKUS) 50 mcg/actuation DsDv Inhale 1 spray into the lungs 2 (two) times a day. Controller 3 each 1    gabapentin (NEURONTIN) 300 MG capsule Take 1 capsule (300 mg total) by mouth 3 (three) times daily as needed (Neuropathy). 270 capsule 1    garlic 1,000 mg Cap Take 1 capsule by mouth once daily.      hydrOXYzine pamoate (VISTARIL) 25 MG Cap Take 1 capsule (25 mg total) by mouth every 6 (six) hours as needed (anxiety). 60 capsule 0    loratadine (CLARITIN) 10 mg tablet Take 1 tablet (10 mg total) by mouth  once daily. 90 tablet 1    meclizine (ANTIVERT) 12.5 mg tablet Take 1 tablet (12.5 mg total) by mouth 3 (three) times daily as needed for Dizziness. 60 tablet 1    olmesartan-hydrochlorothiazide (BENICAR HCT) 20-12.5 mg per tablet Take 1 tablet by mouth once daily. 90 tablet 1    olopatadine (PATANOL) 0.1 % ophthalmic solution Place 1 drop into both eyes 2 (two) times daily.      oxybutynin (DITROPAN) 5 MG Tab Take 1 tablet (5 mg total) by mouth 3 (three) times daily. (Patient taking differently: Take 5 mg by mouth 2 (two) times daily.) 270 tablet 1    [DISCONTINUED] rosuvastatin (CRESTOR) 10 MG tablet Take 1 tablet (10 mg total) by mouth every other day. 90 tablet 1       Review of patient's allergies indicates:  No Known Allergies    Physical Examination     Vitals:    06/20/25 1343   BP: 121/66   Pulse: 81   Resp: 18   Temp: 97.9 °F (36.6 °C)     Physical Exam  Vitals and nursing note reviewed.   HENT:      Head: Normocephalic.      Right Ear: A middle ear effusion is present.      Left Ear: A middle ear effusion is present.      Nose: Nose normal.      Mouth/Throat:      Mouth: Mucous membranes are moist.      Pharynx: Oropharynx is clear.   Eyes:      Conjunctiva/sclera: Conjunctivae normal.   Cardiovascular:      Rate and Rhythm: Normal rate and regular rhythm.      Pulses: Normal pulses.      Heart sounds: Normal heart sounds.   Pulmonary:      Effort: Pulmonary effort is normal.   Abdominal:      General: Abdomen is flat. Bowel sounds are normal.      Palpations: Abdomen is soft.      Tenderness: There is abdominal tenderness in the suprapubic area. There is no right CVA tenderness or left CVA tenderness.   Musculoskeletal:         General: Normal range of motion.      Cervical back: Normal range of motion.   Skin:     General: Skin is warm and dry.      Capillary Refill: Capillary refill takes less than 2 seconds.   Neurological:      Mental Status: She is alert. Mental status is at baseline.   Psychiatric:          Mood and Affect: Mood normal.         Behavior: Behavior normal.                 Assessment and Plan      Problem List Items Addressed This Visit       Hypertension    Relevant Orders    Comprehensive Metabolic Panel (Completed)    Iron and TIBC (Completed)    Vitamin D deficiency     Other Visit Diagnoses         Cystitis with hematuria    -  Primary    Relevant Medications    sulfamethoxazole-trimethoprim 800-160mg (BACTRIM DS) 800-160 mg Tab    Other Relevant Orders    CBC Auto Differential (Completed)    Urine Culture High Risk ($$) (Completed)      Dysuria        Relevant Orders    POCT URINALYSIS W/O SCOPE (Completed)      Left hip pain        Relevant Medications    ibuprofen (ADVIL,MOTRIN) 800 MG tablet      Hyperlipidemia, unspecified hyperlipidemia type        Relevant Medications    rosuvastatin (CRESTOR) 10 MG tablet      Abnormal finding of blood chemistry, unspecified        Relevant Orders    Iron and TIBC (Completed)            Assessment & Plan    IMPRESSION:  Diagnosed UTI based on symptoms and UA.   Noted fluid behind both ears, more pronounced in left ear, likely causing balance issues.    URINARY TRACT INFECTION:  - Patient reports burning sensation when urinating for approximately 4 days.  - UA showed large WBC, positive nitrites, and trace blood.   - Ordered urine culture to confirm bacterial presence and ensure appropriate antibiotic selection.  - Prescribed Bactrim based on previous culture sensitivity.  - Recommend follow-up urine test after completing antibiotics to ensure infection clearance.  - Informed patient that the anti-inflammatory properties of antibiotics may help with ear fullness.    LEG PAIN:  - Patient reports throbbing pain in the leg.  - Prescribed ibuprofen 800 mg as needed for pain management.    GENERAL HEALTH MANAGEMENT:  - Recommend increasing Flonase usage to twice daily when ears feel full to reduce inflammation and promote drainage.  - Ordered routine lab  work including CBC and previously pending Vitamin D test.          FOLLOW UP  Will call with lab results.   Follow up in 3 months or as needed.      Health Maintenance     Health Maintenance Topics with due status: Not Due       Topic Last Completion Date    Influenza Vaccine 11/06/2024    Mammogram 12/20/2024    TETANUS VACCINE 02/03/2025    Hemoglobin A1c (Prediabetes) 03/09/2025    Lipid Panel 03/09/2025    DEXA Scan 06/27/2025       Health Maintenance Due   Topic Date Due    Shingles Vaccine (2 of 3) 12/10/2013          Signature:  Nikki Bridges NP  [address] [place]  [address]    Date of encounter: 6/20/25    This note was generated with the assistance of ambient listening technology. Verbal consent was obtained by the patient and accompanying visitor(s) for the recording of patient appointment to facilitate this note. I attest to having reviewed and edited the generated note for accuracy, though some syntax or spelling errors may persist. Please contact the author of this note for any clarification.

## 2025-07-14 ENCOUNTER — TELEPHONE (OUTPATIENT)
Dept: ORTHOPEDICS | Facility: CLINIC | Age: 75
End: 2025-07-14
Payer: COMMERCIAL

## 2025-07-14 NOTE — TELEPHONE ENCOUNTER
Copied from CRM #[id number]. Topic: Appointments - Appointment Access  >> Jul 10, 2025  9:18 AM Jennifer wrote:  Who Called: Delmis Shell' daughter Jaci    Caller is requesting a sooner appointment. Caller declined first available appointment listed below. Caller will not accept being placed on the waitlist and is requesting a message be sent to doctor.    When is the first available appointment? July 28, 2025  Options offered (Virtual Visit, Urgent Care): Ortho  Symptoms: 3 MOS F/U r. Shoulder      Preferred Method of Contact: Phone Call  Best Call Back Number, if different: [phone]  Additional Information: Pt. Wants to be seen at  9:40 AM on July 28, 2025 since daughter Jaci Shell is bringing her and will be seen at 9:20 AM. Please leave a VM if daughter does not answer.

## 2025-07-28 ENCOUNTER — OFFICE VISIT (OUTPATIENT)
Dept: ORTHOPEDICS | Facility: CLINIC | Age: 75
End: 2025-07-28
Payer: COMMERCIAL

## 2025-07-28 VITALS
WEIGHT: 158 LBS | HEIGHT: 65 IN | SYSTOLIC BLOOD PRESSURE: 110 MMHG | OXYGEN SATURATION: 97 % | RESPIRATION RATE: 18 BRPM | HEART RATE: 82 BPM | BODY MASS INDEX: 26.33 KG/M2 | DIASTOLIC BLOOD PRESSURE: 66 MMHG

## 2025-07-28 DIAGNOSIS — M25.511 CHRONIC RIGHT SHOULDER PAIN: Primary | ICD-10-CM

## 2025-07-28 DIAGNOSIS — M25.812 IMPINGEMENT OF LEFT SHOULDER: ICD-10-CM

## 2025-07-28 DIAGNOSIS — G89.29 CHRONIC RIGHT SHOULDER PAIN: Primary | ICD-10-CM

## 2025-07-28 PROCEDURE — 3078F DIAST BP <80 MM HG: CPT | Mod: CPTII,,, | Performed by: ORTHOPAEDIC SURGERY

## 2025-07-28 PROCEDURE — 1159F MED LIST DOCD IN RCRD: CPT | Mod: CPTII,,, | Performed by: ORTHOPAEDIC SURGERY

## 2025-07-28 PROCEDURE — 1126F AMNT PAIN NOTED NONE PRSNT: CPT | Mod: CPTII,,, | Performed by: ORTHOPAEDIC SURGERY

## 2025-07-28 PROCEDURE — 99999 PR PBB SHADOW E&M-EST. PATIENT-LVL V: CPT | Mod: PBBFAC,,, | Performed by: ORTHOPAEDIC SURGERY

## 2025-07-28 PROCEDURE — 99215 OFFICE O/P EST HI 40 MIN: CPT | Mod: PBBFAC | Performed by: ORTHOPAEDIC SURGERY

## 2025-07-28 PROCEDURE — 3074F SYST BP LT 130 MM HG: CPT | Mod: CPTII,,, | Performed by: ORTHOPAEDIC SURGERY

## 2025-07-28 PROCEDURE — 99213 OFFICE O/P EST LOW 20 MIN: CPT | Mod: S$PBB,25,, | Performed by: ORTHOPAEDIC SURGERY

## 2025-07-28 PROCEDURE — 20610 DRAIN/INJ JOINT/BURSA W/O US: CPT | Mod: PBBFAC,LT | Performed by: ORTHOPAEDIC SURGERY

## 2025-07-28 PROCEDURE — 99999PBSHW PR PBB SHADOW TECHNICAL ONLY FILED TO HB: Mod: PBBFAC,,,

## 2025-07-28 RX ORDER — BUPIVACAINE HYDROCHLORIDE 2.5 MG/ML
1 INJECTION, SOLUTION EPIDURAL; INFILTRATION; INTRACAUDAL; PERINEURAL
Status: DISCONTINUED | OUTPATIENT
Start: 2025-07-28 | End: 2025-07-28 | Stop reason: HOSPADM

## 2025-07-28 RX ORDER — TRIAMCINOLONE ACETONIDE 40 MG/ML
40 INJECTION, SUSPENSION INTRA-ARTICULAR; INTRAMUSCULAR
Status: DISCONTINUED | OUTPATIENT
Start: 2025-07-28 | End: 2025-07-28 | Stop reason: HOSPADM

## 2025-07-28 RX ADMIN — TRIAMCINOLONE ACETONIDE 40 MG: 40 INJECTION, SUSPENSION INTRA-ARTICULAR; INTRAMUSCULAR at 09:07

## 2025-07-28 RX ADMIN — BUPIVACAINE HYDROCHLORIDE 1 ML: 2.5 INJECTION, SOLUTION EPIDURAL; INFILTRATION; INTRACAUDAL; PERINEURAL at 09:07

## 2025-07-28 NOTE — PROCEDURES
Large Joint Aspiration/Injection: L subacromial bursa    Date/Time: 7/28/2025 9:40 AM    Performed by: Pablo Torres MD  Authorized by: Pablo Torres MD      Details:  Needle Size:  22 G  Ultrasonic Guidance for needle placement?: No    Approach:  Posterior  Location:  Shoulder  Site:  L subacromial bursa  Medications:  1 mL BUPivacaine (PF) 0.25% (2.5 mg/ml) 0.25 % (2.5 mg/mL); 40 mg triamcinolone acetonide 40 mg/mL  Patient tolerance:  Patient tolerated the procedure well with no immediate complications

## 2025-07-28 NOTE — PROGRESS NOTES
Patient is here today for left shoulder pain.  She has had the previous right shoulder injection for impingement.  On left she is having pain on impingement testing crossed adduction testing.  No instability.  No crepitus.  We discussed treatment options.  I injected her left shoulder 1 cc of Marcaine 1 cc Kenalog.  Let her use her arm as tolerates.  Right shoulder is doing better after the injection.

## 2025-08-05 ENCOUNTER — TELEPHONE (OUTPATIENT)
Dept: FAMILY MEDICINE | Facility: CLINIC | Age: 75
End: 2025-08-05
Payer: COMMERCIAL

## 2025-08-05 DIAGNOSIS — M25.552 LEFT HIP PAIN: ICD-10-CM

## 2025-08-05 RX ORDER — MECLIZINE HCL 12.5 MG 12.5 MG/1
12.5 TABLET ORAL
Qty: 60 TABLET | Refills: 1 | Status: SHIPPED | OUTPATIENT
Start: 2025-08-05

## 2025-08-05 RX ORDER — IBUPROFEN 800 MG/1
800 TABLET, FILM COATED ORAL EVERY 6 HOURS PRN
Qty: 30 TABLET | Refills: 2 | Status: SHIPPED | OUTPATIENT
Start: 2025-08-05

## 2025-08-05 RX ORDER — IBUPROFEN 800 MG/1
800 TABLET, FILM COATED ORAL EVERY 6 HOURS PRN
Qty: 30 TABLET | Refills: 0 | OUTPATIENT
Start: 2025-08-05

## 2025-08-05 NOTE — TELEPHONE ENCOUNTER
Patient notified of refill. Use ibuprofen only as needed. Increase water intake. Keep next appoint ment in clinic.   Acetaminophen IV and continue to monitor.

## 2025-08-05 NOTE — TELEPHONE ENCOUNTER
Pt requested refill on ibuprofen 800 mg for left hip pain. Explained to patient I would send her request to PCP due to her kidney function was abnormal in June 2025. Pt's next appointment is in November 2025.

## 2025-08-05 NOTE — TELEPHONE ENCOUNTER
I sent her some for her to use just as needed. Encourage increased water intake. We will recheck labs when she comes in November.

## 2025-08-05 NOTE — TELEPHONE ENCOUNTER
Copied from CRM #[id number]. Topic: Medications - Medication Refill  >> Aug 5, 2025  2:19 PM Dinah wrote:  [facility] [place] [facility]    Refill or New Rx:New Rx  RX Name and Strength:  ibuprofen (ADVIL,MOTRIN) 800 MG tablet   How is the patient currently taking it? (ex. 1XDay):every 6 hrs  Is this a 30 day or 90 day RX:90  Local or Mail Order:local  [facility] [place] [facility] [address] [address]  [address] [address] [facility] [address]  Phone: [phone] Fax: [phone]  Hours: Not open 24 hours         Ordering Provider:olivia Bridges      Preferred Method of Contact: Phone Call  Patient's Preferred Phone Number on File: [phone]

## 2025-08-20 DIAGNOSIS — M54.16 LUMBAR RADICULOPATHY: Primary | ICD-10-CM

## 2025-08-21 RX ORDER — VITAMIN E (DL,TOCOPHERYL ACET) 45 MG/0.25
1 DROPS ORAL
Qty: 90 TABLET | Refills: 1 | OUTPATIENT
Start: 2025-08-21

## 2025-08-21 RX ORDER — CYCLOBENZAPRINE HCL 5 MG
5 TABLET ORAL 3 TIMES DAILY PRN
Qty: 21 TABLET | Refills: 0 | Status: SHIPPED | OUTPATIENT
Start: 2025-08-21